# Patient Record
Sex: MALE | Race: WHITE | Employment: FULL TIME | URBAN - METROPOLITAN AREA
[De-identification: names, ages, dates, MRNs, and addresses within clinical notes are randomized per-mention and may not be internally consistent; named-entity substitution may affect disease eponyms.]

---

## 2017-01-10 ENCOUNTER — TELEPHONE (OUTPATIENT)
Dept: RHEUMATOLOGY | Facility: CLINIC | Age: 27
End: 2017-01-10

## 2017-02-08 ENCOUNTER — OFFICE VISIT (OUTPATIENT)
Dept: RHEUMATOLOGY | Facility: CLINIC | Age: 27
End: 2017-02-08

## 2017-02-08 VITALS
SYSTOLIC BLOOD PRESSURE: 128 MMHG | HEIGHT: 69 IN | WEIGHT: 225 LBS | BODY MASS INDEX: 33.33 KG/M2 | DIASTOLIC BLOOD PRESSURE: 78 MMHG | HEART RATE: 87 BPM

## 2017-02-08 DIAGNOSIS — M05.79 SEROPOSITIVE RHEUMATOID ARTHRITIS OF MULTIPLE SITES (HCC): Primary | ICD-10-CM

## 2017-02-08 DIAGNOSIS — M25.462 EFFUSION OF LEFT KNEE: ICD-10-CM

## 2017-02-08 DIAGNOSIS — Z51.81 ENCOUNTER FOR THERAPEUTIC DRUG MONITORING: ICD-10-CM

## 2017-02-08 PROCEDURE — 99212 OFFICE O/P EST SF 10 MIN: CPT | Performed by: INTERNAL MEDICINE

## 2017-02-08 PROCEDURE — 99214 OFFICE O/P EST MOD 30 MIN: CPT | Performed by: INTERNAL MEDICINE

## 2017-02-08 RX ORDER — HYDROCODONE BITARTRATE AND ACETAMINOPHEN 5; 325 MG/1; MG/1
TABLET ORAL
Qty: 60 TABLET | Refills: 0 | Status: SHIPPED | OUTPATIENT
Start: 2017-02-08 | End: 2017-03-07

## 2017-02-09 NOTE — PROGRESS NOTES
HPI:    Patient ID: Jossy Lyles is a 27year old male. 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  He was  switched from Enbrel to Actemra SC injection because of persistent left knee folic acid 1 MG Oral Tab Take 1 tablet (1 mg total) by mouth once daily. Disp: 90 tablet Rfl: 3   methotrexate 2.5 MG Oral Tab TAKE 8 TABLETS BY MOUTH ONCE WEEKLY.  Disp: 104 tablet Rfl: 1   Multiple Vitamins-Minerals (MULTI-VITAMIN/MINERALS) Oral Tab Nolen Essex Sig: Take 1 tablet every 4-6 hours as needed.            Imaging & Referrals:  None       #[phone]

## 2017-03-06 RX ORDER — PREDNISONE 1 MG/1
TABLET ORAL
Qty: 90 TABLET | Refills: 1 | Status: SHIPPED | OUTPATIENT
Start: 2017-03-06 | End: 2017-10-28

## 2017-03-07 RX ORDER — HYDROCODONE BITARTRATE AND ACETAMINOPHEN 5; 325 MG/1; MG/1
TABLET ORAL
Qty: 30 TABLET | Refills: 0 | Status: SHIPPED | OUTPATIENT
Start: 2017-03-07 | End: 2017-04-24

## 2017-03-07 NOTE — TELEPHONE ENCOUNTER
Per pt, he needs a refill on his HYDROcodone-acetaminophen 5-325 MG Oral Tab, pt will  in [facility]. Current Outpatient Prescriptions:        HYDROcodone-acetaminophen 5-325 MG Oral Tab Take 1 tablet every 4-6 hours as needed.  Disp: 60 tablet Rfl: 0

## 2017-04-18 NOTE — TELEPHONE ENCOUNTER
Pt is asking to have a refill on his pain med   PT need printed at [employer] [id number]   Please advise     Current Outpatient Prescriptions:  HYDROcodone-acetaminophen 5-325 MG Oral Tab Take 1 tablet every 4-6 hours as needed.  Disp: 30 tablet Rfl: 0

## 2017-04-19 RX ORDER — HYDROCODONE BITARTRATE AND ACETAMINOPHEN 5; 325 MG/1; MG/1
TABLET ORAL
Qty: 30 TABLET | Refills: 0 | OUTPATIENT
Start: 2017-04-19

## 2017-04-19 NOTE — TELEPHONE ENCOUNTER
Pt. Has to make appt. To get this refill - i dont' refill without appt. Or he can wait until he sees Dr. Lexi Degroot.

## 2017-04-20 NOTE — TELEPHONE ENCOUNTER
Spoke with patient and he will wait until Dr Giovanna Verduzco is back. He wants to  the script in [facility] on Tuesday. Routing to Dr Giovanna Verduzco. Please see message below.

## 2017-04-24 RX ORDER — HYDROCODONE BITARTRATE AND ACETAMINOPHEN 5; 325 MG/1; MG/1
TABLET ORAL
Qty: 30 TABLET | Refills: 0 | Status: SHIPPED | OUTPATIENT
Start: 2017-04-24 | End: 2017-06-05

## 2017-04-24 RX ORDER — ETANERCEPT 50 MG/ML
SOLUTION SUBCUTANEOUS
Qty: 12 ML | Refills: 1 | Status: SHIPPED | OUTPATIENT
Start: 2017-04-24 | End: 2017-08-28

## 2017-04-24 NOTE — TELEPHONE ENCOUNTER
Spoke with patient and let him know his Rx for Floydene Yankton will be ready for pickup at [facility] tomorrow.   available until [facility].

## 2017-05-24 NOTE — TELEPHONE ENCOUNTER
LOV:2-8  Last Filled:10-12, #104 with 1 refill  Labs:11-7, WNL  Future Appointments  Date Time Provider Rodrigo Cooper   6/26/2017 3:10 PM Kyle Alexander MD 2014 [facility]       Please Advise

## 2017-06-05 RX ORDER — HYDROCODONE BITARTRATE AND ACETAMINOPHEN 5; 325 MG/1; MG/1
TABLET ORAL
Qty: 20 TABLET | Refills: 0 | Status: SHIPPED | OUTPATIENT
Start: 2017-06-05 | End: 2017-08-28

## 2017-06-05 NOTE — TELEPHONE ENCOUNTER
LOV: 2/8/17 LR 4/24 #30 no refills. Pt would like to  script tomorrow at [facility]. Future Appointments  Date Time Provider Rodrigo Cooper   6/26/2017 3:10 PM Sonali Herrera MD 2014 [facility]     Please advise.

## 2017-06-05 NOTE — TELEPHONE ENCOUNTER
Pt calling requesting refill, please call when ready for  at [facility]. Current Outpatient Prescriptions:  HYDROcodone-acetaminophen 5-325 MG Oral Tab Take 1 tablet every 4-6 hours as needed.  Disp: 30 tablet Rfl: 0`

## 2017-06-15 ENCOUNTER — TELEPHONE (OUTPATIENT)
Dept: RHEUMATOLOGY | Facility: CLINIC | Age: 27
End: 2017-06-15

## 2017-06-15 NOTE — TELEPHONE ENCOUNTER
Please call him and remind him that he has to have his monitoring labs done before his upcoming appointment. They are long overdue! Thanks.

## 2017-06-15 NOTE — TELEPHONE ENCOUNTER
Left detailed message for pt to have standing lab orders completed in the system before his scheduled appointment on 6/26.

## 2017-06-19 ENCOUNTER — TELEPHONE (OUTPATIENT)
Dept: RHEUMATOLOGY | Facility: CLINIC | Age: 27
End: 2017-06-19

## 2017-06-19 NOTE — TELEPHONE ENCOUNTER
Most recent lab results faxed to Dr. Sera Sutherland office to [phone] as requested by pt. No further action required at this time.

## 2017-06-19 NOTE — TELEPHONE ENCOUNTER
Pt called in asking if his most recent lab results can be faxed over to Dr. Emma Carrera office, please. Fax # [phone] and phone # [phone]  Pt states he is going to be seeing this physician from now on.   Pt states he is at that doctors office right n

## 2017-10-30 RX ORDER — PREDNISONE 1 MG/1
TABLET ORAL
Qty: 90 TABLET | Refills: 1 | Status: SHIPPED | OUTPATIENT
Start: 2017-10-30 | End: 2019-04-18 | Stop reason: ALTCHOICE

## 2017-10-30 NOTE — TELEPHONE ENCOUNTER
LOV:2-8  Last Filled:3-6, #90 with 1 refill  Labs:   Future Appointments  Date Time Provider Rodrigo Allison   11/14/2017 5:40 PM MD Sina Perez       Please Advise

## 2017-11-09 RX ORDER — FOLIC ACID 1 MG/1
1 TABLET ORAL
Qty: 90 TABLET | Refills: 3 | Status: SHIPPED | OUTPATIENT
Start: 2017-11-09 | End: 2018-11-01

## 2017-11-09 NOTE — TELEPHONE ENCOUNTER
LOV: 2/8/17  Future Appointments  Date Time Provider Rodrigo Cooper   11/15/2017 5:00 PM MD Olga Curry Ce     Labs:   Component      Latest Ref Rng & Units 10/14/2017   WHITE BLOOD CELL COUNT      3.8 - 10.8 Thousand/uL 7.6   RED BL DIOXIDE      20 - 31 mmol/L 29   CALCIUM      8.6 - 10.3 mg/dL 9.6   PROTEIN, TOTAL      6.1 - 8.1 g/dL 7.5   Albumin      3.6 - 5.1 g/dL 4.5   GLOBULIN, TOTAL      1.9 - 3.7 g/dL (calc) 3.0   ALBUMIN/GLOBULIN RATIO      1.0 - 2.5 (calc) 1.5   Total Biliru

## 2017-11-09 NOTE — TELEPHONE ENCOUNTER
Need refill on pt Folic Acid. Current Outpatient Prescriptions:                          folic acid 1 MG Oral Tab Take 1 tablet (1 mg total) by mouth once daily.  Disp: 90 tablet Rfl: 3           ,

## 2018-04-11 ENCOUNTER — TELEPHONE (OUTPATIENT)
Dept: RHEUMATOLOGY | Facility: CLINIC | Age: 28
End: 2018-04-11

## 2018-04-11 DIAGNOSIS — M81.8 OTHER OSTEOPOROSIS WITHOUT CURRENT PATHOLOGICAL FRACTURE: Primary | ICD-10-CM

## 2018-04-11 NOTE — TELEPHONE ENCOUNTER
Please give him a call. He needs repeat labs and appointment. I was notified by Beba Smith that his bone density is a concern, given his chronic use of prednisone. I ordered a bone density scan, for him to have done at his convenience.     Thank yo

## 2018-04-21 RX ORDER — PREDNISONE 5 MG/1
TABLET ORAL
Qty: 90 TABLET | Refills: 1 | OUTPATIENT
Start: 2018-04-21

## 2018-04-21 NOTE — TELEPHONE ENCOUNTER
LOV:2-8-17  Last Filled: 10-30, #90 with 1 refill  Labs:   Future Appointments  Date Time Provider Rodrigo Allison   5/24/2018 6:00 PM Miguelina Rein, MD [facility]       Please Advise

## 2018-05-05 ENCOUNTER — HOSPITAL ENCOUNTER (OUTPATIENT)
Dept: CT IMAGING | Facility: HOSPITAL | Age: 28
Discharge: HOME | End: 2018-05-05
Attending: ORTHOPAEDIC SURGERY
Payer: COMMERCIAL

## 2018-05-05 DIAGNOSIS — R26.2 DIFFICULTY WALKING: ICD-10-CM

## 2018-05-05 DIAGNOSIS — R26.9 GAIT DISTURBANCE: ICD-10-CM

## 2018-05-05 DIAGNOSIS — M25.562 LEFT KNEE PAIN, UNSPECIFIED CHRONICITY: ICD-10-CM

## 2018-05-05 DIAGNOSIS — M25.462 KNEE EFFUSION, LEFT: ICD-10-CM

## 2018-05-05 DIAGNOSIS — M06.9 CHRONIC RHEUMATIC ARTHRITIS (HCC): ICD-10-CM

## 2018-05-05 PROCEDURE — 73700 CT LOWER EXTREMITY W/O DYE: CPT | Performed by: ORTHOPAEDIC SURGERY

## 2018-06-04 ENCOUNTER — LAB ENCOUNTER (OUTPATIENT)
Dept: LAB | Facility: HOSPITAL | Age: 28
End: 2018-06-04
Attending: ORTHOPAEDIC SURGERY
Payer: COMMERCIAL

## 2018-06-04 DIAGNOSIS — R26.2 CANNOT WALK: Primary | ICD-10-CM

## 2018-06-04 DIAGNOSIS — M25.562 LEFT KNEE PAIN: ICD-10-CM

## 2018-06-04 DIAGNOSIS — M25.462 SWELLING OF LEFT KNEE JOINT: ICD-10-CM

## 2018-06-04 PROCEDURE — 36415 COLL VENOUS BLD VENIPUNCTURE: CPT

## 2018-06-04 PROCEDURE — 86140 C-REACTIVE PROTEIN: CPT

## 2018-06-04 PROCEDURE — 93005 ELECTROCARDIOGRAM TRACING: CPT

## 2018-06-04 PROCEDURE — 85025 COMPLETE CBC W/AUTO DIFF WBC: CPT

## 2018-06-04 PROCEDURE — 93010 ELECTROCARDIOGRAM REPORT: CPT | Performed by: ORTHOPAEDIC SURGERY

## 2018-06-04 PROCEDURE — 80053 COMPREHEN METABOLIC PANEL: CPT

## 2018-06-04 PROCEDURE — 81001 URINALYSIS AUTO W/SCOPE: CPT

## 2018-06-04 PROCEDURE — 85652 RBC SED RATE AUTOMATED: CPT

## 2018-07-21 ENCOUNTER — LAB ENCOUNTER (OUTPATIENT)
Dept: LAB | Age: 28
End: 2018-07-21
Attending: ORTHOPAEDIC SURGERY
Payer: COMMERCIAL

## 2018-07-21 DIAGNOSIS — Z01.818 PREOPERATIVE EXAMINATION: Primary | ICD-10-CM

## 2018-07-21 LAB
ALBUMIN SERPL BCP-MCNC: 3.9 G/DL (ref 3.5–4.8)
ALBUMIN/GLOB SERPL: 1.1 {RATIO} (ref 1–2)
ALP SERPL-CCNC: 99 U/L (ref 32–100)
ALT SERPL-CCNC: 19 U/L (ref 17–63)
ANION GAP SERPL CALC-SCNC: 7 MMOL/L (ref 0–18)
AST SERPL-CCNC: 18 U/L (ref 15–41)
BASOPHILS # BLD: 0.1 K/UL (ref 0–0.2)
BASOPHILS NFR BLD: 1 %
BILIRUB SERPL-MCNC: 0.8 MG/DL (ref 0.3–1.2)
BUN SERPL-MCNC: 11 MG/DL (ref 8–20)
BUN/CREAT SERPL: 14.9 (ref 10–20)
CALCIUM SERPL-MCNC: 9.5 MG/DL (ref 8.5–10.5)
CHLORIDE SERPL-SCNC: 104 MMOL/L (ref 95–110)
CO2 SERPL-SCNC: 28 MMOL/L (ref 22–32)
CREAT SERPL-MCNC: 0.74 MG/DL (ref 0.5–1.5)
CRP SERPL-MCNC: 4.4 MG/DL (ref 0–0.9)
EOSINOPHIL # BLD: 0.1 K/UL (ref 0–0.7)
EOSINOPHIL NFR BLD: 2 %
ERYTHROCYTE [DISTWIDTH] IN BLOOD BY AUTOMATED COUNT: 13.6 % (ref 11–15)
ERYTHROCYTE [SEDIMENTATION RATE] IN BLOOD: 37 MM/HR (ref 0–15)
GLOBULIN PLAS-MCNC: 3.5 G/DL (ref 2.5–3.7)
GLUCOSE SERPL-MCNC: 95 MG/DL (ref 70–99)
HCT VFR BLD AUTO: 40.4 % (ref 41–52)
HGB BLD-MCNC: 13.6 G/DL (ref 13.5–17.5)
LYMPHOCYTES # BLD: 1.5 K/UL (ref 1–4)
LYMPHOCYTES NFR BLD: 23 %
MCH RBC QN AUTO: 31.4 PG (ref 27–32)
MCHC RBC AUTO-ENTMCNC: 33.7 G/DL (ref 32–37)
MCV RBC AUTO: 93.3 FL (ref 80–100)
MONOCYTES # BLD: 0.5 K/UL (ref 0–1)
MONOCYTES NFR BLD: 7 %
NEUTROPHILS # BLD AUTO: 4.3 K/UL (ref 1.8–7.7)
NEUTROPHILS NFR BLD: 66 %
OSMOLALITY UR CALC.SUM OF ELEC: 287 MOSM/KG (ref 275–295)
PATIENT FASTING: YES
PLATELET # BLD AUTO: 252 K/UL (ref 140–400)
PMV BLD AUTO: 9.9 FL (ref 7.4–10.3)
POTASSIUM SERPL-SCNC: 4 MMOL/L (ref 3.3–5.1)
PROT SERPL-MCNC: 7.4 G/DL (ref 5.9–8.4)
RBC # BLD AUTO: 4.33 M/UL (ref 4.5–5.9)
SODIUM SERPL-SCNC: 139 MMOL/L (ref 136–144)
WBC # BLD AUTO: 6.5 K/UL (ref 4–11)

## 2018-07-21 PROCEDURE — 36415 COLL VENOUS BLD VENIPUNCTURE: CPT

## 2018-07-21 PROCEDURE — 80053 COMPREHEN METABOLIC PANEL: CPT

## 2018-07-21 PROCEDURE — 85025 COMPLETE CBC W/AUTO DIFF WBC: CPT

## 2018-07-21 PROCEDURE — 85652 RBC SED RATE AUTOMATED: CPT

## 2018-07-21 PROCEDURE — 86140 C-REACTIVE PROTEIN: CPT

## 2018-07-21 PROCEDURE — 87086 URINE CULTURE/COLONY COUNT: CPT

## 2020-02-15 ENCOUNTER — OFFICE VISIT (OUTPATIENT)
Dept: RHEUMATOLOGY | Facility: CLINIC | Age: 30
End: 2020-02-15
Payer: COMMERCIAL

## 2020-02-15 ENCOUNTER — TELEPHONE (OUTPATIENT)
Dept: RHEUMATOLOGY | Facility: CLINIC | Age: 30
End: 2020-02-15

## 2020-02-15 VITALS
HEART RATE: 65 BPM | RESPIRATION RATE: 16 BRPM | WEIGHT: 230 LBS | HEIGHT: 70 IN | SYSTOLIC BLOOD PRESSURE: 130 MMHG | BODY MASS INDEX: 32.93 KG/M2 | DIASTOLIC BLOOD PRESSURE: 73 MMHG

## 2020-02-15 DIAGNOSIS — M05.79 RHEUMATOID ARTHRITIS INVOLVING MULTIPLE SITES WITH POSITIVE RHEUMATOID FACTOR (HCC): Primary | ICD-10-CM

## 2020-02-15 DIAGNOSIS — Z51.81 THERAPEUTIC DRUG MONITORING: ICD-10-CM

## 2020-02-15 DIAGNOSIS — E55.9 VITAMIN D DEFICIENCY: ICD-10-CM

## 2020-02-15 PROCEDURE — 99204 OFFICE O/P NEW MOD 45 MIN: CPT | Performed by: INTERNAL MEDICINE

## 2020-02-15 PROCEDURE — 99212 OFFICE O/P EST SF 10 MIN: CPT | Performed by: INTERNAL MEDICINE

## 2020-02-15 NOTE — PROGRESS NOTES
Jose Alfonso is a 30year old male who presents for Patient presents with:  Consult: Khang LYONS, meds  . HPI:     I had the pleasure of seeing Jose Alfonso on 2/15/2020 for evaluation.     He is a pleasant 30year old who had seropositive He has pain in his upper back. He's not been on any other medication. His eyes have gotten red and hurt. He has never seen an eye doctor. He's going to see an eye doctor. No hx of psoriasis. He sees dr. Jade George for a few years.  He has been fillin • No Known Problems Maternal Grandfather    • No Known Problems Paternal Grandmother    • No Known Problems Paternal Grandfather    • No Known Problems Sister    • No Known Problems Brother    • No Known Problems Other    no arthritis, 1 sister,    Social EXTREMITIES: no cyanosis, clubbing or edema  NEURO: intact touch, 5/5 ue and le strength    Component      Latest Ref Rng & Units 1/21/2019   Glucose Urine      mg/dL Negative   Bilirubin      Negative Negative   Ketones, UA      Negative mg/dL Negative BLASTS      % CANCELED   NUCLEATED RBC      0 /100 WBC CANCELED   COMMENT(S)       CANCELED   Glucose      65 - 99 mg/dL 94   BUN      7 - 25 mg/dL 11   CREATININE      0.60 - 1.35 mg/dL 0.76   eGFR NON-AFR.  AMERICAN      > OR = 60 mL/min/1.73m2 122   eGFR Consult: Seropositve RA, meds    1.  Seropositive RA- re -establihsing - since age 10 already had left knee arthroplasty b/c of RA effect and advanced RA changes /fusion of left wrist - severe ongoing RA -  That is partially controlled with Methotrexate   P patient

## 2020-02-15 NOTE — PATIENT INSTRUCTIONS
1. Check labs  2. Cont. Methotrexate 8 tablets a week   3. Cont. Folic acid 1mg a day   4. Plan to restart enbrel 50mg a week -   5. Return to clinic in 4-6 weeks. Etanercept injection  Brand Name: Enbrel  What is this medicine?   ETANERCEPT (et a NER sept usually do not require medical attention (report to your doctor or health care professional if they continue or are bothersome):  · dizziness  · headache  · nausea  · redness, itching, or swelling at the injection site  · vomiting  What may interact with t medicine. Call your doctor or health care professional for advice if you get a fever, chills or sore throat, or other symptoms of a cold or flu. Do not treat yourself. This drug decreases your body's ability to fight infections.  Try to avoid being around

## 2020-02-17 NOTE — TELEPHONE ENCOUNTER
Enrollment form completed. Unable to locate recent QFT results. Contacted pt and informed he will need to complete TB screening labs before office can initiate PA, he verbalized understanding - he will try to complete labs this week. Awaiting results.

## 2020-02-20 ENCOUNTER — APPOINTMENT (OUTPATIENT)
Dept: RHEUMATOLOGY | Facility: CLINIC | Age: 30
End: 2020-02-20

## 2020-04-10 NOTE — TELEPHONE ENCOUNTER
Remind letters sent to patient for completion of labs on 3/16/20 and 4/10/20. PA process will start when labs completed.

## 2020-06-01 ENCOUNTER — TELEPHONE (OUTPATIENT)
Dept: RHEUMATOLOGY | Facility: CLINIC | Age: 30
End: 2020-06-01

## 2020-06-01 ENCOUNTER — OFFICE VISIT (OUTPATIENT)
Dept: RHEUMATOLOGY | Facility: CLINIC | Age: 30
End: 2020-06-01
Payer: COMMERCIAL

## 2020-06-01 VITALS
RESPIRATION RATE: 16 BRPM | DIASTOLIC BLOOD PRESSURE: 69 MMHG | HEART RATE: 73 BPM | HEIGHT: 70 IN | SYSTOLIC BLOOD PRESSURE: 114 MMHG | WEIGHT: 220 LBS | BODY MASS INDEX: 31.5 KG/M2

## 2020-06-01 DIAGNOSIS — M05.79 RHEUMATOID ARTHRITIS INVOLVING MULTIPLE SITES WITH POSITIVE RHEUMATOID FACTOR (HCC): Primary | ICD-10-CM

## 2020-06-01 DIAGNOSIS — Z51.81 THERAPEUTIC DRUG MONITORING: ICD-10-CM

## 2020-06-01 PROCEDURE — 99214 OFFICE O/P EST MOD 30 MIN: CPT | Performed by: INTERNAL MEDICINE

## 2020-06-01 PROCEDURE — 99212 OFFICE O/P EST SF 10 MIN: CPT | Performed by: INTERNAL MEDICINE

## 2020-06-01 NOTE — PROGRESS NOTES
Ann Giordano is a 30year old male who presents for Patient presents with:  Rheumatoid Arthritis  Hand Pain  Knee Pain  Medication Follow-Up  . HPI:     I had the pleasure of seeing Ann Giordano on 2/15/2020 for evaluation.     He is a pleasan His right knee on occasion can hurt. And occl his elbows. occl his right ankle bothers him. No hip pain. He has pain in his upper back. He's not been on any other medication. His eyes have gotten red and hurt. He has never seen an eye doctor.  He's • Multiple Vitamins-Minerals (MULTI-VITAMIN/MINERALS) Oral Tab Take 1 tablet by mouth daily.      • CVS ASPIRIN  MG Oral Tab EC TAKE 1 TABLET BY MOUTH TWICE A DAY FOR A TOTAL OF 5 WEEKS FROM SURGERY DATE  0      Past Medical History:   Diagnosis Date Psych: no hx of anxiety or depression  ENDO: no hx of thyroid disease, no hx of DM  Joint/Muscluskeltal: see HPI,   All other ROS are negative.      EXAM:   /69 (BP Location: Left arm, Patient Position: Sitting, Cuff Size: adult)   Pulse 73   Resp 16 32.0 - 36.0 g/dL 34.2   RDW      11.0 - 15.0 % 13.0   Platelet Count      102 - 400 Thousand/uL 208   MPV      7.5 - 12.5 fL 11.7   NEUTROPHILS ABSOLUTE      1,500 - 7,800 cells/uL 4,904   ABSOLUTE BAND NEUTROPHILS      0 - 750 cells/uL CANCELED   ABSO >40 mg/dL 46   Triglycerides      <150 mg/dL 142   LDL Cholesterol Calc      mg/dL (calc) 101 (H)   CHOL/HDLC RATIO      <5.0 (calc) 3.7   NON-HDL CHOLESTEROL      <130 mg/dL (calc) 125   TSH      0.40 - 4.50 mIU/L 1.19       11/28/2014 - right wrist x 4. Still Plan to restart enbrel 50mg a week -   5. Return to clinic in 3 months.   Sept 1st at 4:30 pm - video visit     Kyle Berrios MD  6/1/2020   3:40 PM  - [place]ed [place]  through Epic

## 2020-06-01 NOTE — PATIENT INSTRUCTIONS
1 check labs . 2.Cont. Methotrexate 8 tablets a week   3. Cont. Folic acid 1mg a day   4. Still Plan to restart enbrel 50mg a week -   5. Return to clinic in 3 months.  Sept 1st at 4:30 pm - video visit

## 2020-06-01 NOTE — TELEPHONE ENCOUNTER
Follow up appointment scheduled as requested.   Future Appointments   Date Time Provider Rodrigo Cooper   9/1/2020  4:30 PM Michelle Christopher MD 2014 [facility]

## 2020-06-02 NOTE — TELEPHONE ENCOUNTER
He said he is going to get a [facility] by his work - ok to schedule a nurse visit with him once his labs are done

## 2020-06-02 NOTE — TELEPHONE ENCOUNTER
Prior authorization approved Case ID: [id number]      Payer:  [address]    [phone]     [phone]    CaseId:[id number];Status:Approved; Review Type:Prior Auth; Coverage Start Date:05/03/2020; Coverage End Date:08/31/2020;     ROBIN silveira

## 2020-06-08 NOTE — TELEPHONE ENCOUNTER
Pt contacted and reminded to have labs completed. Pt reported he will go and have labs completed this week.

## 2020-06-08 NOTE — TELEPHONE ENCOUNTER
When patient has labs completed new script will need to be sent to [facility] [address] [place] [address] for Enbrel. Lab currently not completed at this time.

## 2020-09-01 ENCOUNTER — TELEPHONE (OUTPATIENT)
Dept: RHEUMATOLOGY | Facility: CLINIC | Age: 30
End: 2020-09-01

## 2020-09-01 ENCOUNTER — TELEMEDICINE (OUTPATIENT)
Dept: RHEUMATOLOGY | Facility: CLINIC | Age: 30
End: 2020-09-01
Payer: COMMERCIAL

## 2020-09-01 DIAGNOSIS — Z51.81 THERAPEUTIC DRUG MONITORING: ICD-10-CM

## 2020-09-01 DIAGNOSIS — M05.79 RHEUMATOID ARTHRITIS INVOLVING MULTIPLE SITES WITH POSITIVE RHEUMATOID FACTOR (HCC): Primary | ICD-10-CM

## 2020-09-01 PROCEDURE — 99214 OFFICE O/P EST MOD 30 MIN: CPT | Performed by: INTERNAL MEDICINE

## 2020-09-01 NOTE — PATIENT INSTRUCTIONS
1.  Plan  To restart enbrel 50mg a week   2. Cont. Methotrexate 8 tablets a week   3. Cont. Folic acid 1mg a day   4. Check labs again in 3 months.    5. Return to clinic in 3 months

## 2020-09-01 NOTE — PROGRESS NOTES
Christiano Connor is a 30year old male who presents for No chief complaint on file. Manjinder Lee HPI:     I had the pleasure of seeing Christiano Connor on 2/15/2020 for evaluation. He is a pleasant 30year old who had seropositive RA - since age 10.  His rhe He's not been on any other medication. His eyes have gotten red and hurt. He has never seen an eye doctor. He's going to see an eye doctor. No hx of psoriasis. He sees dr. Jairo Muñoz for a few years. He has been filling the methotrexate.    He feels so Current Outpatient Medications   Medication Sig Dispense Refill   • HYDROcodone-acetaminophen (NORCO)  MG Oral Tab Take 1 tablet by mouth every 4 (four) hours as needed for Pain.  60 tablet 0   • Butalbital-APAP-Caffeine -40 MG Oral Tab Take 1 t CVS: No chest pain, no heart disease  RS: No SOB, no Cough, No Pleurtic pain,   GI: No nausea, no vomiiting, no abominal pain, no hx of ulcer, no gastritis, no heartburn, no dyshpagia, no BRBPR or melena  He has a lot of pain with eating bad food - he does 0 cells/uL CANCELED   ABSOLUTE LYMPHOCYTES      850 - 3,900 cells/uL 2,305   ABSOLUTE MONOCYTES      200 - 950 cells/uL 543   ABSOLUTE EOSINOPHILS      15 - 500 cells/uL 258   ABSOLUTE BASOPHILS      0 - 200 cells/uL 80   ABSOLUTE BLASTS      0 cells/u 1/11/2014 - left hand xray   CONCLUSION:  1. Advanced arthritic changes about the wrist consistent with rheumatoid     arthritis with fusion of multiple carpal bones as well as     metacarpocarpal joints.  There has been some progression of disease when Please note that the following visit was completed using two-way, real-time interactive audio and/or video communication. This was done with patient consent.    This has been done in good mode to provide continuity of care in the best interest of the prov

## 2020-09-02 LAB
ALBUMIN/GLOBULIN RATIO: 1.6 (CALC) (ref 1–2.5)
ALBUMIN: 4.5 G/DL (ref 3.6–5.1)
ALKALINE PHOSPHATASE: 114 U/L (ref 36–130)
ALT: 20 U/L (ref 9–46)
ANA SCREEN, IFA: NEGATIVE
AST: 16 U/L (ref 10–40)
BILIRUBIN, TOTAL: 0.3 MG/DL (ref 0.2–1.2)
BUN: 10 MG/DL (ref 7–25)
C-REACTIVE PROTEIN: 10.4 MG/L
CALCIUM: 9.7 MG/DL (ref 8.6–10.3)
CARBON DIOXIDE: 32 MMOL/L (ref 20–32)
CHLORIDE: 101 MMOL/L (ref 98–110)
CREATININE: 0.84 MG/DL (ref 0.6–1.35)
CYCLIC CITRULLINATED$PEPTIDE (CCP) AB (IGG): 120 UNITS
EGFR IF AFRICN AM: 135 ML/MIN/1.73M2
EGFR IF NONAFRICN AM: 117 ML/MIN/1.73M2
GLOBULIN: 2.9 G/DL (CALC) (ref 1.9–3.7)
GLUCOSE: 108 MG/DL (ref 65–99)
HEMATOCRIT: 41.4 % (ref 38.5–50)
HEMOGLOBIN: 14.4 G/DL (ref 13.2–17.1)
MCH: 32.3 PG (ref 27–33)
MCHC: 34.8 G/DL (ref 32–36)
MCV: 92.8 FL (ref 80–100)
MITOGEN-NIL: >10 IU/ML
MPV: 11.7 FL (ref 7.5–12.5)
NIL: 0.02 IU/ML
PLATELET COUNT: 238 THOUSAND/UL (ref 140–400)
POTASSIUM: 3.7 MMOL/L (ref 3.5–5.3)
PROTEIN, TOTAL: 7.4 G/DL (ref 6.1–8.1)
QUANTIFERON(R)-TB GOLD PLUS, 1 TUBE: NEGATIVE
RDW: 11.9 % (ref 11–15)
RED BLOOD CELL COUNT: 4.46 MILLION/UL (ref 4.2–5.8)
RHEUMATOID FACTOR: 27 IU/ML
SED RATE BY MODIFIED$WESTERGREN: 14 MM/H
SIGNAL TO CUT-OFF: 0.02
SODIUM: 141 MMOL/L (ref 135–146)
TB1-NIL: 0 IU/ML
TB2-NIL: 0 IU/ML
VITAMIN D, 25-OH, TOTAL: 26 NG/ML (ref 30–100)
WHITE BLOOD CELL COUNT: 9.6 THOUSAND/UL (ref 3.8–10.8)

## 2020-09-02 NOTE — TELEPHONE ENCOUNTER
PA has been approved since 6/1/2020. It is a restart, correct? ? Does patient know if specialty pharmacy is correct?     Pharmacy     [facility]. [address] [facility] [phone], [phone]   Outpatient Medication Detail

## 2020-09-02 NOTE — TELEPHONE ENCOUNTER
Patient aware PA approved for Enbrel. Pt advised to activate an Enbrel co-pay card online. Pt given [facility] [address] [phone], [phone] information to set up delivery after activation of co-pay card.

## 2020-09-02 NOTE — TELEPHONE ENCOUNTER
Ok great! Thank you . I didn't see that in the notes when I Was checking for him - yes it's a restart - he was off of it for several years though - so please check with him if that is his specialty . I dont' think he has changed his insurance since June.

## 2020-09-21 RX ORDER — ETANERCEPT 50 MG/ML
50 SOLUTION SUBCUTANEOUS
Qty: 4 ML | Refills: 5 | Status: SHIPPED | OUTPATIENT
Start: 2020-09-21 | End: 2021-02-28

## 2020-09-21 NOTE — TELEPHONE ENCOUNTER
Enbrel to be filled with new specialty pharmacy: [facility]. Script pended. LOV: 9/1/2020  No future appointments.   Labs:     Component      Latest Ref Rng & Units 8/31/2020   Glucose      65 - 99 mg/dL 108 (H)   BUN      7 - 25 mg/dL 10   CREATININE

## 2021-01-22 ENCOUNTER — TELEPHONE (OUTPATIENT)
Dept: RHEUMATOLOGY | Facility: CLINIC | Age: 31
End: 2021-01-22

## 2021-02-20 ENCOUNTER — OFFICE VISIT (OUTPATIENT)
Dept: RHEUMATOLOGY | Facility: CLINIC | Age: 31
End: 2021-02-20
Payer: COMMERCIAL

## 2021-02-20 VITALS
HEART RATE: 99 BPM | BODY MASS INDEX: 30.09 KG/M2 | HEIGHT: 70 IN | DIASTOLIC BLOOD PRESSURE: 78 MMHG | WEIGHT: 210.19 LBS | SYSTOLIC BLOOD PRESSURE: 120 MMHG

## 2021-02-20 DIAGNOSIS — M06.9 RHEUMATOID ARTHRITIS, INVOLVING UNSPECIFIED SITE, UNSPECIFIED WHETHER RHEUMATOID FACTOR PRESENT (HCC): Primary | ICD-10-CM

## 2021-02-20 DIAGNOSIS — Z51.81 THERAPEUTIC DRUG MONITORING: ICD-10-CM

## 2021-02-20 PROCEDURE — 3078F DIAST BP <80 MM HG: CPT | Performed by: INTERNAL MEDICINE

## 2021-02-20 PROCEDURE — 96372 THER/PROPH/DIAG INJ SC/IM: CPT | Performed by: INTERNAL MEDICINE

## 2021-02-20 PROCEDURE — 3074F SYST BP LT 130 MM HG: CPT | Performed by: INTERNAL MEDICINE

## 2021-02-20 PROCEDURE — 99214 OFFICE O/P EST MOD 30 MIN: CPT | Performed by: INTERNAL MEDICINE

## 2021-02-20 PROCEDURE — 3008F BODY MASS INDEX DOCD: CPT | Performed by: INTERNAL MEDICINE

## 2021-02-20 RX ORDER — METHYLPREDNISOLONE 4 MG/1
TABLET ORAL
Qty: 1 PACKAGE | Refills: 0 | Status: SHIPPED | OUTPATIENT
Start: 2021-02-20 | End: 2021-04-12 | Stop reason: ALTCHOICE

## 2021-02-20 RX ORDER — TRIAMCINOLONE ACETONIDE 40 MG/ML
40 INJECTION, SUSPENSION INTRA-ARTICULAR; INTRAMUSCULAR ONCE
Status: COMPLETED | OUTPATIENT
Start: 2021-02-20 | End: 2021-02-20

## 2021-02-20 RX ADMIN — TRIAMCINOLONE ACETONIDE 40 MG: 40 INJECTION, SUSPENSION INTRA-ARTICULAR; INTRAMUSCULAR at 09:03:00

## 2021-02-20 NOTE — PROGRESS NOTES
Olga Mcghee is a 26year old male who presents for Patient presents with:  Rheumatoid Arthritis  Joint Pain  Muscle Pain  . HPI:     I had the pleasure of seeing Olga Mcghee on 2/15/2020 for evaluation.     He is a pleasant 30year old who h hip pain. He has pain in his upper back. He's not been on any other medication. His eyes have gotten red and hurt. He has never seen an eye doctor. He's going to see an eye doctor. No hx of psoriasis. He sees dr. Suzanne López for a few years.  He has and worse by the day. He is having about 5/10 pain. It' snot good. He has pain on his left shoulder , hands , feet , ankles and right knee. He has left elbow pain. He can't straighten that.      Wt Readings from Last 2 Encounters:  02/20/21 : 210 lb 3 Review Of Systems:  Fatigue  Constitutional:No fever, no change in weight or appetitie  Derm: No rashes, no oral ulcers, no alopecia, no photosensitivity, no psoriasis  HEENT: No dry eyes, no dry mouth, no Raynaud's, no nasal ulcers, no parotid swelling, o swelling   No edema    Component      Latest Ref Rng & Units 8/31/2020   Glucose      65 - 99 mg/dL 108 (H)   BUN      7 - 25 mg/dL 10   CREATININE      0.60 - 1.35 mg/dL 0.84   eGFR NON-AFR.  AMERICAN      > OR = 60 mL/min/1.73m2 117   eGFR AFRICAN COOPER 11/28/2014 - right wrist xray   1. No acute disease. 2. Old fracture fifth metacarpal.  3. Degenerative cysts lunate bone. 1/11/2014 - left hand xray   CONCLUSION:  1.  Advanced arthritic changes about the wrist consistent with rheumatoid     arthritis

## 2021-02-20 NOTE — PATIENT INSTRUCTIONS
1.  Plan  To restart enbrel 50mg a week -   2. Cont. Methotrexate 8 tablets a week   3. Cont. Folic acid 1mg a day   4. Check labs this week   5. Return to clinic in 2-3  Months. 6. Kenalog 40mg im x 1   7.  Medrol santiago

## 2021-02-28 RX ORDER — ETANERCEPT 50 MG/ML
50 SOLUTION SUBCUTANEOUS
Qty: 4 ML | Refills: 5 | Status: SHIPPED | OUTPATIENT
Start: 2021-02-28 | End: 2021-03-01

## 2021-03-01 RX ORDER — ETANERCEPT 50 MG/ML
50 SOLUTION SUBCUTANEOUS
Qty: 4 ML | Refills: 5 | Status: SHIPPED | OUTPATIENT
Start: 2021-03-01 | End: 2021-05-15

## 2021-05-05 ENCOUNTER — HOSPITAL ENCOUNTER (OUTPATIENT)
Dept: GENERAL RADIOLOGY | Age: 31
Discharge: HOME | End: 2021-05-05
Attending: NURSE PRACTITIONER
Payer: COMMERCIAL

## 2021-05-05 DIAGNOSIS — M25.561 ACUTE PAIN OF RIGHT KNEE: ICD-10-CM

## 2021-05-05 PROCEDURE — 73564 X-RAY EXAM KNEE 4 OR MORE: CPT | Performed by: NURSE PRACTITIONER

## 2021-05-15 ENCOUNTER — TELEPHONE (OUTPATIENT)
Dept: RHEUMATOLOGY | Facility: CLINIC | Age: 31
End: 2021-05-15

## 2021-05-15 ENCOUNTER — OFFICE VISIT (OUTPATIENT)
Dept: RHEUMATOLOGY | Facility: CLINIC | Age: 31
End: 2021-05-15
Payer: COMMERCIAL

## 2021-05-15 VITALS
WEIGHT: 206 LBS | HEIGHT: 70 IN | DIASTOLIC BLOOD PRESSURE: 74 MMHG | SYSTOLIC BLOOD PRESSURE: 110 MMHG | RESPIRATION RATE: 16 BRPM | HEART RATE: 90 BPM | BODY MASS INDEX: 29.49 KG/M2

## 2021-05-15 DIAGNOSIS — Z51.81 THERAPEUTIC DRUG MONITORING: ICD-10-CM

## 2021-05-15 DIAGNOSIS — M06.9 RHEUMATOID ARTHRITIS, INVOLVING UNSPECIFIED SITE, UNSPECIFIED WHETHER RHEUMATOID FACTOR PRESENT (HCC): Primary | ICD-10-CM

## 2021-05-15 PROCEDURE — 96372 THER/PROPH/DIAG INJ SC/IM: CPT | Performed by: INTERNAL MEDICINE

## 2021-05-15 PROCEDURE — 99214 OFFICE O/P EST MOD 30 MIN: CPT | Performed by: INTERNAL MEDICINE

## 2021-05-15 PROCEDURE — 3008F BODY MASS INDEX DOCD: CPT | Performed by: INTERNAL MEDICINE

## 2021-05-15 PROCEDURE — 3078F DIAST BP <80 MM HG: CPT | Performed by: INTERNAL MEDICINE

## 2021-05-15 PROCEDURE — 3074F SYST BP LT 130 MM HG: CPT | Performed by: INTERNAL MEDICINE

## 2021-05-15 RX ORDER — METHYLPREDNISOLONE 4 MG/1
TABLET ORAL
Qty: 1 KIT | Refills: 0 | Status: SHIPPED | OUTPATIENT
Start: 2021-05-15 | End: 2021-06-01

## 2021-05-15 RX ORDER — ETANERCEPT 50 MG/ML
50 SOLUTION SUBCUTANEOUS
Qty: 4 ML | Refills: 5 | Status: SHIPPED | OUTPATIENT
Start: 2021-05-15 | End: 2021-08-21

## 2021-05-15 RX ORDER — TRIAMCINOLONE ACETONIDE 40 MG/ML
40 INJECTION, SUSPENSION INTRA-ARTICULAR; INTRAMUSCULAR ONCE
Status: COMPLETED | OUTPATIENT
Start: 2021-05-15 | End: 2021-05-15

## 2021-05-15 RX ADMIN — TRIAMCINOLONE ACETONIDE 40 MG: 40 INJECTION, SUSPENSION INTRA-ARTICULAR; INTRAMUSCULAR at 10:13:00

## 2021-05-15 NOTE — PROGRESS NOTES
Phil Jeffers is a 26year old male who presents for Patient presents with:  Rheumatoid Arthritis  Knee Pain  Hip Pain: Right  Shoulder Pain  Hand Pain  Foot Pain  Medication Follow-Up  .    HPI:     I had the pleasure of seeing Phil Jfefers on 2 hurt. And occl his elbows. occl his right ankle bothers him. No hip pain. He has pain in his upper back. He's not been on any other medication. His eyes have gotten red and hurt. He has never seen an eye doctor. He's going to see an eye doctor.    Ciarra Gould 12/2020 and he thinks hemight have had it. He is feeling worse and worse by the day. He is having about 5/10 pain. It' snot good. He has pain on his left shoulder , hands , feet , ankles and right knee. He has left elbow pain.  He can't straighten t • No Known Problems Maternal Grandmother    • No Known Problems Maternal Grandfather    • No Known Problems Paternal Grandmother    • No Known Problems Paternal Grandfather    • No Known Problems Sister    • No Known Problems Brother    • No Known Problems Ulnar deviation of right 5th finger   Left wrist +1 swelling with fusion   Right wrist +1-2 swellign with decreased extension and flexion. B/l elbows tender -   Left elbwo can't fully extend, tender +1 swelling.    Left shoulder can't abduct - newly decr NON-REACTIVE   SIGNAL TO CUT-OFF      <1.00 0.02   C-REACTIVE PROTEIN      <8.0 mg/L 10.4 (H)   SED RATE BY MODIFIED$WESTERGREN      < OR = 15 mm/h 14   EDWARD SCREEN, IFA      NEGATIVE NEGATIVE   HEPATITIS B CORE AB TOTAL      NON-REACTIVE NON-REACTIVE   HBS in the past   Kenalog 40mg Im x 1   Medrol santiago today     2. Left knee arthroplasty in 2018 - hx of AVN of left knee     3. He hasn't gotten the covid vaccine yet - d/w him that if he gets it he can wait 2 weeks.  Afterwards and thens tart enbrel if he gest

## 2021-05-15 NOTE — PATIENT INSTRUCTIONS
1.  Plan  To restart enbrel 50mg a week -   2. Cont. Methotrexate 8 tablets a week   3. Cont. Folic acid 1mg a day   4. Check labs this week   5. Return to clinic in 3  Months. 6.Medrol santiago  7. Due for labs today   8.  Kenalog 40mg im x 1

## 2021-05-15 NOTE — TELEPHONE ENCOUNTER
Pt. Did not receive his enbrel when sent in march to [facility] special[facility] - so [facility]    can we double check what is specialty pharmacy is again and I will resend the script there.

## 2021-05-17 NOTE — TELEPHONE ENCOUNTER
Contacted patient. He does not know if there is a preferred pharmacy. Contacted pharmacy. They stated they have the prescription and it is ready to fill. They confirmed Domingo's phone number and they will call him to set up delivery.

## 2021-07-16 ENCOUNTER — HOSPITAL ENCOUNTER (OUTPATIENT)
Dept: GENERAL RADIOLOGY | Age: 31
Discharge: HOME | End: 2021-07-16
Attending: INTERNAL MEDICINE
Payer: COMMERCIAL

## 2021-07-16 DIAGNOSIS — M25.572 PAIN IN LEFT ANKLE AND JOINTS OF LEFT FOOT: ICD-10-CM

## 2021-07-16 DIAGNOSIS — M25.571 PAIN IN JOINT INVOLVING RIGHT ANKLE AND FOOT: ICD-10-CM

## 2021-07-16 DIAGNOSIS — M25.512 ACUTE PAIN OF LEFT SHOULDER: ICD-10-CM

## 2021-07-16 PROCEDURE — 73030 X-RAY EXAM OF SHOULDER: CPT | Performed by: INTERNAL MEDICINE

## 2021-07-16 PROCEDURE — 73630 X-RAY EXAM OF FOOT: CPT | Performed by: INTERNAL MEDICINE

## 2021-07-16 PROCEDURE — 73610 X-RAY EXAM OF ANKLE: CPT | Performed by: INTERNAL MEDICINE

## 2021-08-16 ENCOUNTER — HOSPITAL ENCOUNTER (OUTPATIENT)
Dept: ULTRASOUND IMAGING | Age: 31
Discharge: HOME | End: 2021-08-16
Attending: INTERNAL MEDICINE
Payer: COMMERCIAL

## 2021-08-16 DIAGNOSIS — R63.4 ABNORMAL WEIGHT LOSS: ICD-10-CM

## 2021-08-16 PROCEDURE — 76700 US EXAM ABDOM COMPLETE: CPT | Performed by: INTERNAL MEDICINE

## 2021-08-21 ENCOUNTER — TELEPHONE (OUTPATIENT)
Dept: RHEUMATOLOGY | Facility: CLINIC | Age: 31
End: 2021-08-21

## 2021-08-21 ENCOUNTER — OFFICE VISIT (OUTPATIENT)
Dept: RHEUMATOLOGY | Facility: CLINIC | Age: 31
End: 2021-08-21
Payer: COMMERCIAL

## 2021-08-21 ENCOUNTER — LAB ENCOUNTER (OUTPATIENT)
Dept: LAB | Facility: HOSPITAL | Age: 31
End: 2021-08-21
Attending: INTERNAL MEDICINE
Payer: COMMERCIAL

## 2021-08-21 VITALS
SYSTOLIC BLOOD PRESSURE: 129 MMHG | DIASTOLIC BLOOD PRESSURE: 75 MMHG | WEIGHT: 177 LBS | HEART RATE: 111 BPM | BODY MASS INDEX: 25.34 KG/M2 | HEIGHT: 70 IN

## 2021-08-21 DIAGNOSIS — Z51.81 THERAPEUTIC DRUG MONITORING: ICD-10-CM

## 2021-08-21 DIAGNOSIS — M06.9 RHEUMATOID ARTHRITIS, INVOLVING UNSPECIFIED SITE, UNSPECIFIED WHETHER RHEUMATOID FACTOR PRESENT (HCC): Primary | ICD-10-CM

## 2021-08-21 DIAGNOSIS — M06.9 ARTHRITIS, RHEUMATOID (HCC): Primary | ICD-10-CM

## 2021-08-21 DIAGNOSIS — G89.29 CHRONIC PAIN OF RIGHT KNEE: ICD-10-CM

## 2021-08-21 DIAGNOSIS — M06.9 RHEUMATOID ARTHRITIS, INVOLVING UNSPECIFIED SITE, UNSPECIFIED WHETHER RHEUMATOID FACTOR PRESENT (HCC): ICD-10-CM

## 2021-08-21 DIAGNOSIS — M25.561 CHRONIC PAIN OF RIGHT KNEE: ICD-10-CM

## 2021-08-21 LAB
ALBUMIN SERPL-MCNC: 2.9 G/DL (ref 3.4–5)
ALBUMIN/GLOB SERPL: 0.5 {RATIO} (ref 1–2)
ALP LIVER SERPL-CCNC: 186 U/L
ALT SERPL-CCNC: 18 U/L
ANION GAP SERPL CALC-SCNC: 8 MMOL/L (ref 0–18)
AST SERPL-CCNC: 15 U/L (ref 15–37)
BILIRUB SERPL-MCNC: 0.4 MG/DL (ref 0.1–2)
BUN BLD-MCNC: 6 MG/DL (ref 7–18)
BUN/CREAT SERPL: 8.6 (ref 10–20)
CALCIUM BLD-MCNC: 9.6 MG/DL (ref 8.5–10.1)
CHLORIDE SERPL-SCNC: 102 MMOL/L (ref 98–112)
CO2 SERPL-SCNC: 26 MMOL/L (ref 21–32)
CREAT BLD-MCNC: 0.7 MG/DL
CRP SERPL-MCNC: 17.7 MG/DL (ref ?–0.3)
DEPRECATED RDW RBC AUTO: 43.7 FL (ref 35.1–46.3)
ERYTHROCYTE [DISTWIDTH] IN BLOOD BY AUTOMATED COUNT: 13.6 % (ref 11–15)
ERYTHROCYTE [SEDIMENTATION RATE] IN BLOOD: 84 MM/HR
GLOBULIN PLAS-MCNC: 5.8 G/DL (ref 2.8–4.4)
GLUCOSE BLD-MCNC: 122 MG/DL (ref 70–99)
HCT VFR BLD AUTO: 35 %
HGB BLD-MCNC: 10.9 G/DL
M PROTEIN MFR SERPL ELPH: 8.7 G/DL (ref 6.4–8.2)
MCH RBC QN AUTO: 27.2 PG (ref 26–34)
MCHC RBC AUTO-ENTMCNC: 31.1 G/DL (ref 31–37)
MCV RBC AUTO: 87.3 FL
OSMOLALITY SERPL CALC.SUM OF ELEC: 281 MOSM/KG (ref 275–295)
PATIENT FASTING Y/N/NP: NO
PLATELET # BLD AUTO: 470 10(3)UL (ref 150–450)
POTASSIUM SERPL-SCNC: 3.7 MMOL/L (ref 3.5–5.1)
RBC # BLD AUTO: 4.01 X10(6)UL
SODIUM SERPL-SCNC: 136 MMOL/L (ref 136–145)
WBC # BLD AUTO: 9.9 X10(3) UL (ref 4–11)

## 2021-08-21 PROCEDURE — 36415 COLL VENOUS BLD VENIPUNCTURE: CPT | Performed by: INTERNAL MEDICINE

## 2021-08-21 PROCEDURE — 85652 RBC SED RATE AUTOMATED: CPT | Performed by: INTERNAL MEDICINE

## 2021-08-21 PROCEDURE — 3008F BODY MASS INDEX DOCD: CPT | Performed by: INTERNAL MEDICINE

## 2021-08-21 PROCEDURE — 99214 OFFICE O/P EST MOD 30 MIN: CPT | Performed by: INTERNAL MEDICINE

## 2021-08-21 PROCEDURE — 3074F SYST BP LT 130 MM HG: CPT | Performed by: INTERNAL MEDICINE

## 2021-08-21 PROCEDURE — 85027 COMPLETE CBC AUTOMATED: CPT | Performed by: INTERNAL MEDICINE

## 2021-08-21 PROCEDURE — 20610 DRAIN/INJ JOINT/BURSA W/O US: CPT | Performed by: INTERNAL MEDICINE

## 2021-08-21 PROCEDURE — 3078F DIAST BP <80 MM HG: CPT | Performed by: INTERNAL MEDICINE

## 2021-08-21 PROCEDURE — 80053 COMPREHEN METABOLIC PANEL: CPT | Performed by: INTERNAL MEDICINE

## 2021-08-21 PROCEDURE — 86140 C-REACTIVE PROTEIN: CPT | Performed by: INTERNAL MEDICINE

## 2021-08-21 PROCEDURE — 86480 TB TEST CELL IMMUN MEASURE: CPT

## 2021-08-21 RX ORDER — ETANERCEPT 50 MG/ML
50 SOLUTION SUBCUTANEOUS
Qty: 4 ML | Refills: 5 | Status: SHIPPED | OUTPATIENT
Start: 2021-08-21 | End: 2021-09-20

## 2021-08-21 RX ORDER — METHYLPREDNISOLONE 4 MG/1
TABLET ORAL
Qty: 1 EACH | Refills: 0 | Status: SHIPPED | OUTPATIENT
Start: 2021-08-21 | End: 2021-10-01 | Stop reason: ALTCHOICE

## 2021-08-21 RX ORDER — TRIAMCINOLONE ACETONIDE 40 MG/ML
40 INJECTION, SUSPENSION INTRA-ARTICULAR; INTRAMUSCULAR ONCE
Status: COMPLETED | OUTPATIENT
Start: 2021-08-21 | End: 2021-08-21

## 2021-08-21 RX ADMIN — TRIAMCINOLONE ACETONIDE 40 MG: 40 INJECTION, SUSPENSION INTRA-ARTICULAR; INTRAMUSCULAR at 10:35:00

## 2021-08-21 NOTE — PATIENT INSTRUCTIONS
1.   restart enbrel 50mg a week - will check with the pharmacy -   2. Cont. Methotrexate 8 tablets a week   3. Cont. Folic acid 1mg a day   4. Check labs today   5. Return to clinic in1  Months. 6.Medrol santiago  7. Due for labs today   8.  Rest right knee x 3

## 2021-08-21 NOTE — PROCEDURES
With paitent's consent, I injected pt's right knee with 1ml lidocaine 1 % and 1 ml kenalog 40. It was done under sterile technique using iodine and alcohol swabs and ethyl chloride was used as an anaesthetic spray.  Pt.  tolerated it well.  r

## 2021-08-21 NOTE — PROGRESS NOTES
Ann Giordano is a 26year old male who presents for Patient presents with:  Medication Follow-Up  Joint Pain: Swelling and stiffness  . HPI:     I had the pleasure of seeing Ann Giordano on 2/15/2020 for evaluation.     He is a pleasant 29 yea him. No hip pain. He has pain in his upper back. He's not been on any other medication. His eyes have gotten red and hurt. He has never seen an eye doctor. He's going to see an eye doctor. No hx of psoriasis.      He sees dr. Dedrick Sanford for a few years worse and worse by the day. He is having about 5/10 pain. It' snot good. He has pain on his left shoulder , hands , feet , ankles and right knee. He has left elbow pain. He can't straighten that. 5/15/2021  He has 5/10 pain .  He has a lot of disc Past Surgical History:   Procedure Laterality Date   • KNEE ARTHROSCOPY Left 2009    ACL/MCL repair    • KNEE REPLACEMENT SURGERY Left 08/2018      Family History   Problem Relation Age of Onset   • No Known Problems Father    • No Known Problems Mother CAD, no neck tendnerness, good ROM,   No rashes  CVS: RRR, no murmurs  RS: CTAB, no crackles, no rhonchi  ABD: Soft Non tender,   Joint exam   Mild tender in mcps - lef tmore than right -   Chronic +2 -3swelling in right 2nd and 3rd mcps and left+2 2nd and 32.3   MCHC      32.0 - 36.0 g/dL 34.8   RDW      11.0 - 15.0 % 11.9   Platelet Count      586 - 400 Thousand/uL 238   MPV      7.5 - 12.5 fL 11.7   QUANTIFERON(R)-TB GOLD PLUS, 1 TUBE      NEGATIVE NEGATIVE   NIL      IU/mL 0.02   MITOGEN-NIL      IU/mL > partially controlled with Methotrexate   - cont . methotrexate 20mg a week   - plan on restarting enbrel 50mg a week - has new insurance since January - and need to resent his enbrel - he didn't receive it when sent on3/1/2021 - - and dind't receive phone c

## 2021-08-21 NOTE — TELEPHONE ENCOUNTER
Pt. Has not gotten enbrel - [facility] that he needs it to be sent to. We sent it to home delivery - but I noticed it's not specialyt - . He states he never got a phone call from the pharmacy.

## 2021-08-23 NOTE — TELEPHONE ENCOUNTER
Patient appt switched.      Future Appointments   Date Time Provider Department Center   9/1/2021  3:20 PM MD [facility] ([facility]) Dustin Mathew   9/22/2021 10:40 AM Chuck Chinchilla MD [address] [place] [address]

## 2021-08-23 NOTE — TELEPHONE ENCOUNTER
Belle sent. I called [facility] and they stated patient has a little over $200 co-pay per month. But he can contact their co-pay department to get started on co-pay card. Spoke to patient if insurance had changed. He stated he has [address].  I info

## 2021-08-24 ENCOUNTER — APPOINTMENT (OUTPATIENT)
Dept: URBAN - METROPOLITAN AREA CLINIC 321 | Age: 31
Setting detail: DERMATOLOGY
End: 2021-08-24

## 2021-08-24 DIAGNOSIS — D22 MELANOCYTIC NEVI: ICD-10-CM

## 2021-08-24 PROBLEM — D22.62 MELANOCYTIC NEVI OF LEFT UPPER LIMB, INCLUDING SHOULDER: Status: ACTIVE | Noted: 2021-08-24

## 2021-08-24 LAB
M TB IFN-G CD4+ T-CELLS BLD-ACNC: 0.04 IU/ML
M TB TUBERC IFN-G BLD QL: NEGATIVE
M TB TUBERC IGNF/MITOGEN IGNF CONTROL: 0.71 IU/ML
QFT TB1 AG MINUS NIL: 0.04 IU/ML
QFT TB2 AG MINUS NIL: -0.01 IU/ML

## 2021-08-24 PROCEDURE — 99024 POSTOP FOLLOW-UP VISIT: CPT

## 2021-08-24 PROCEDURE — OTHER SUTURE REMOVAL (GLOBAL PERIOD): OTHER

## 2021-08-24 ASSESSMENT — LOCATION ZONE DERM: LOCATION ZONE: ARM

## 2021-08-24 ASSESSMENT — LOCATION DETAILED DESCRIPTION DERM: LOCATION DETAILED: LEFT POSTERIOR SHOULDER

## 2021-08-24 ASSESSMENT — LOCATION SIMPLE DESCRIPTION DERM: LOCATION SIMPLE: LEFT SHOULDER

## 2021-08-24 NOTE — PROCEDURE: SUTURE REMOVAL (GLOBAL PERIOD)
Add 06594 Cpt? (Important Note: In 2017 The Use Of 17020 Is Being Tracked By Cms To Determine Future Global Period Reimbursement For Global Periods): yes
Detail Level: Simple
Body Location Override (Optional - Billing Will Still Be Based On Selected Body Map Location If Applicable): L upper back

## 2021-09-20 ENCOUNTER — TELEPHONE (OUTPATIENT)
Dept: SURGERY | Facility: CLINIC | Age: 31
End: 2021-09-20

## 2021-09-20 NOTE — TELEPHONE ENCOUNTER
Per Dr. Karthik Foster,    Please offer the patient an appt tomorrow at 9am with Dr. Karthik Foster in Neuro oncology, okay to book x 3. He will need to bring his imaging disc for uploading at his appointment. Please call and offer appt.  Thank you

## 2021-09-20 NOTE — TELEPHONE ENCOUNTER
Patient called and accepted appointment on 9/21/2021 @ 9am.  Informed pt that he needs to bring imaging with to appointment please arrive a little before 9am with imaging. Also informed pt of length of appointment.

## 2021-09-21 ENCOUNTER — OFFICE VISIT (OUTPATIENT)
Dept: NEUROLOGY | Facility: CLINIC | Age: 31
End: 2021-09-21
Payer: COMMERCIAL

## 2021-09-21 ENCOUNTER — NURSE ONLY (OUTPATIENT)
Dept: HEMATOLOGY/ONCOLOGY | Facility: HOSPITAL | Age: 31
End: 2021-09-21
Attending: NEUROLOGICAL SURGERY
Payer: COMMERCIAL

## 2021-09-21 VITALS
WEIGHT: 180.19 LBS | HEART RATE: 119 BPM | SYSTOLIC BLOOD PRESSURE: 121 MMHG | BODY MASS INDEX: 25.8 KG/M2 | OXYGEN SATURATION: 99 % | TEMPERATURE: 98 F | RESPIRATION RATE: 16 BRPM | DIASTOLIC BLOOD PRESSURE: 79 MMHG | HEIGHT: 70 IN

## 2021-09-21 DIAGNOSIS — R53.83 FATIGUE, UNSPECIFIED TYPE: ICD-10-CM

## 2021-09-21 DIAGNOSIS — D49.6 BRAIN TUMOR (HCC): Primary | ICD-10-CM

## 2021-09-21 DIAGNOSIS — R11.2 NAUSEA AND VOMITING, INTRACTABILITY OF VOMITING NOT SPECIFIED, UNSPECIFIED VOMITING TYPE: ICD-10-CM

## 2021-09-21 DIAGNOSIS — H49.22 6TH NERVE PALSY, LEFT: ICD-10-CM

## 2021-09-21 DIAGNOSIS — R63.4 WEIGHT LOSS, UNINTENTIONAL: ICD-10-CM

## 2021-09-21 DIAGNOSIS — R19.7 DIARRHEA, UNSPECIFIED TYPE: ICD-10-CM

## 2021-09-21 PROCEDURE — 99204 OFFICE O/P NEW MOD 45 MIN: CPT | Performed by: NEUROLOGICAL SURGERY

## 2021-09-21 PROCEDURE — 99211 OFF/OP EST MAY X REQ PHY/QHP: CPT

## 2021-09-21 NOTE — H&P
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 Claudell Sandhoff:   Thank you very muc Paternal Grandfather    • No Known Problems Sister    • No Known Problems Brother    • No Known Problems Other       Social History    Socioeconomic History      Marital status:     Tobacco Use      Smoking status: Never Smoker      Smokeless tobacc answering questions appropriately. Left 6th nerve palsy. Decreased left F2N    SPINE:  Gait/Coordination: Intact, non-antalgic gait.    Good strength of bilateral upper and lower extremities   DATA:   None    IMAGING:   MRI Brain, outside imaging reviewed w reviewed imaging and discussed the plan with the patient. The patient agrees with the plan, verbalized understanding and is appreciative. All questions were sought out and thoroughly answered to satisfaction.        Total visit time: 50 min  More than 50% s

## 2021-09-24 ENCOUNTER — LAB ENCOUNTER (OUTPATIENT)
Dept: LAB | Facility: HOSPITAL | Age: 31
End: 2021-09-24
Attending: INTERNAL MEDICINE
Payer: COMMERCIAL

## 2021-09-24 DIAGNOSIS — Z01.818 PRE-OP TESTING: ICD-10-CM

## 2021-09-25 LAB — SARS-COV-2 RNA RESP QL NAA+PROBE: NOT DETECTED

## 2021-09-27 ENCOUNTER — HOSPITAL ENCOUNTER (OUTPATIENT)
Facility: HOSPITAL | Age: 31
Setting detail: HOSPITAL OUTPATIENT SURGERY
Discharge: HOME | End: 2021-09-27
Attending: INTERNAL MEDICINE | Admitting: INTERNAL MEDICINE
Payer: COMMERCIAL

## 2021-09-27 VITALS
WEIGHT: 180 LBS | DIASTOLIC BLOOD PRESSURE: 82 MMHG | RESPIRATION RATE: 14 BRPM | HEIGHT: 70 IN | BODY MASS INDEX: 25.77 KG/M2 | HEART RATE: 90 BPM | OXYGEN SATURATION: 97 % | SYSTOLIC BLOOD PRESSURE: 130 MMHG | TEMPERATURE: 98 F

## 2021-09-27 DIAGNOSIS — R19.7 DIARRHEA, UNSPECIFIED TYPE: ICD-10-CM

## 2021-09-27 DIAGNOSIS — R11.2 NON-INTRACTABLE VOMITING WITH NAUSEA, UNSPECIFIED VOMITING TYPE: ICD-10-CM

## 2021-09-27 DIAGNOSIS — D50.9 IRON DEFICIENCY ANEMIA, UNSPECIFIED IRON DEFICIENCY ANEMIA TYPE: ICD-10-CM

## 2021-09-27 DIAGNOSIS — R63.4 WEIGHT LOSS: ICD-10-CM

## 2021-09-27 DIAGNOSIS — R68.81 EARLY SATIETY: ICD-10-CM

## 2021-09-27 DIAGNOSIS — Z01.818 PRE-OP TESTING: Primary | ICD-10-CM

## 2021-09-27 PROCEDURE — 36415 COLL VENOUS BLD VENIPUNCTURE: CPT | Performed by: INTERNAL MEDICINE

## 2021-09-27 RX ORDER — SODIUM CHLORIDE, SODIUM LACTATE, POTASSIUM CHLORIDE, CALCIUM CHLORIDE 600; 310; 30; 20 MG/100ML; MG/100ML; MG/100ML; MG/100ML
INJECTION, SOLUTION INTRAVENOUS CONTINUOUS
Status: DISCONTINUED | OUTPATIENT
Start: 2021-09-27 | End: 2021-09-27

## 2021-09-27 NOTE — H&P
The H&P dated 9/22/21 was reviewed by Joselyn Bales MD today, the patient was examined and no significant changes have occurred in the patient's condition since the H&P was performed.   I discussed with the patient and/or legal representative the potential

## 2021-09-29 ENCOUNTER — HOSPITAL ENCOUNTER (OUTPATIENT)
Dept: MRI IMAGING | Age: 31
Discharge: HOME | End: 2021-09-29
Attending: PHYSICIAN ASSISTANT
Payer: COMMERCIAL

## 2021-09-29 DIAGNOSIS — R19.7 DIARRHEA, UNSPECIFIED TYPE: ICD-10-CM

## 2021-09-29 DIAGNOSIS — R63.4 WEIGHT LOSS, UNINTENTIONAL: ICD-10-CM

## 2021-09-29 DIAGNOSIS — H49.22 6TH NERVE PALSY, LEFT: ICD-10-CM

## 2021-09-29 DIAGNOSIS — R11.2 NAUSEA AND VOMITING, INTRACTABILITY OF VOMITING NOT SPECIFIED, UNSPECIFIED VOMITING TYPE: ICD-10-CM

## 2021-09-29 DIAGNOSIS — R53.83 FATIGUE, UNSPECIFIED TYPE: ICD-10-CM

## 2021-09-29 DIAGNOSIS — D49.6 BRAIN TUMOR (HCC): ICD-10-CM

## 2021-09-29 PROCEDURE — 72156 MRI NECK SPINE W/O & W/DYE: CPT | Performed by: PHYSICIAN ASSISTANT

## 2021-09-29 PROCEDURE — 72157 MRI CHEST SPINE W/O & W/DYE: CPT | Performed by: PHYSICIAN ASSISTANT

## 2021-09-29 PROCEDURE — A9575 INJ GADOTERATE MEGLUMI 0.1ML: HCPCS | Performed by: PHYSICIAN ASSISTANT

## 2021-10-01 ENCOUNTER — HOSPITAL ENCOUNTER (OUTPATIENT)
Dept: MRI IMAGING | Age: 31
Discharge: HOME | End: 2021-10-01
Attending: PHYSICIAN ASSISTANT
Payer: COMMERCIAL

## 2021-10-01 ENCOUNTER — HOSPITAL ENCOUNTER (OUTPATIENT)
Dept: MRI IMAGING | Age: 31
End: 2021-10-01
Attending: PHYSICIAN ASSISTANT
Payer: COMMERCIAL

## 2021-10-01 DIAGNOSIS — R19.7 DIARRHEA, UNSPECIFIED TYPE: ICD-10-CM

## 2021-10-01 DIAGNOSIS — R53.83 FATIGUE, UNSPECIFIED TYPE: ICD-10-CM

## 2021-10-01 DIAGNOSIS — R11.2 NAUSEA AND VOMITING, INTRACTABILITY OF VOMITING NOT SPECIFIED, UNSPECIFIED VOMITING TYPE: ICD-10-CM

## 2021-10-01 DIAGNOSIS — M54.50 LOWER BACK PAIN: ICD-10-CM

## 2021-10-01 DIAGNOSIS — D49.6 BRAIN TUMOR (HCC): ICD-10-CM

## 2021-10-01 DIAGNOSIS — H49.22 6TH NERVE PALSY, LEFT: ICD-10-CM

## 2021-10-01 DIAGNOSIS — R63.4 WEIGHT LOSS, UNINTENTIONAL: ICD-10-CM

## 2021-10-01 PROCEDURE — 70553 MRI BRAIN STEM W/O & W/DYE: CPT | Performed by: PHYSICIAN ASSISTANT

## 2021-10-01 PROCEDURE — 72158 MRI LUMBAR SPINE W/O & W/DYE: CPT | Performed by: PHYSICIAN ASSISTANT

## 2021-10-01 PROCEDURE — A9575 INJ GADOTERATE MEGLUMI 0.1ML: HCPCS | Performed by: PHYSICIAN ASSISTANT

## 2021-10-02 ENCOUNTER — HOSPITAL ENCOUNTER (OUTPATIENT)
Dept: CT IMAGING | Facility: HOSPITAL | Age: 31
Discharge: HOME | End: 2021-10-02
Attending: PHYSICIAN ASSISTANT
Payer: COMMERCIAL

## 2021-10-02 DIAGNOSIS — R19.7 DIARRHEA, UNSPECIFIED TYPE: ICD-10-CM

## 2021-10-02 DIAGNOSIS — D49.6 BRAIN TUMOR (HCC): ICD-10-CM

## 2021-10-02 DIAGNOSIS — R53.83 FATIGUE, UNSPECIFIED TYPE: ICD-10-CM

## 2021-10-02 DIAGNOSIS — R11.2 NAUSEA AND VOMITING, INTRACTABILITY OF VOMITING NOT SPECIFIED, UNSPECIFIED VOMITING TYPE: ICD-10-CM

## 2021-10-02 DIAGNOSIS — R63.4 WEIGHT LOSS, UNINTENTIONAL: ICD-10-CM

## 2021-10-02 DIAGNOSIS — H49.22 6TH NERVE PALSY, LEFT: ICD-10-CM

## 2021-10-02 PROCEDURE — 74177 CT ABD & PELVIS W/CONTRAST: CPT | Performed by: PHYSICIAN ASSISTANT

## 2021-10-02 PROCEDURE — 82565 ASSAY OF CREATININE: CPT

## 2021-10-02 PROCEDURE — 71260 CT THORAX DX C+: CPT | Performed by: PHYSICIAN ASSISTANT

## 2021-10-04 ENCOUNTER — TELEPHONE (OUTPATIENT)
Dept: SURGERY | Facility: CLINIC | Age: 31
End: 2021-10-04

## 2021-10-04 NOTE — TELEPHONE ENCOUNTER
Pt completed MRI of lumbar spine and brain and also a CT last week. He would like a call to discuss scheduling surgery.

## 2021-10-04 NOTE — TELEPHONE ENCOUNTER
Appointment scheduled for 10/12/2021 @ 11:15. Pt aware of appointment. Informed oncology clinic to add to conference. KARLOI pt wasn't happy that he had to make appointment to come in again.   So you can tell him he still has tumor and your going to yael

## 2021-10-04 NOTE — TELEPHONE ENCOUNTER
1. Medication: None prescribed  2.  Imaging:                 - Reviewed today:                              - MRI Brain, outside imaging:                                            - Please see above imaging section                 - Ordered today:

## 2021-10-04 NOTE — TELEPHONE ENCOUNTER
Please offer appt with Dr. Arelis Partida after conference at 11:15am in oncology. Plan is to review imaging at conference and then discuss results/surgery with patient after.  If patient accepts please also inform oncology so patient may be added for review    Than

## 2021-10-04 NOTE — TELEPHONE ENCOUNTER
Pts wife, Bob Gonzalez, calling to find out why her  is not able to review imaging via a phone visit vs having to come in to the office. She said they were told that was an option at their last visit. Please call to discuss.

## 2021-10-05 ENCOUNTER — VIRTUAL PHONE E/M (OUTPATIENT)
Dept: NEUROLOGY | Facility: CLINIC | Age: 31
End: 2021-10-05
Payer: COMMERCIAL

## 2021-10-05 ENCOUNTER — TELEPHONE (OUTPATIENT)
Dept: SURGERY | Facility: CLINIC | Age: 31
End: 2021-10-05

## 2021-10-05 DIAGNOSIS — H49.22 6TH NERVE PALSY, LEFT: ICD-10-CM

## 2021-10-05 DIAGNOSIS — D49.6 BRAIN TUMOR (HCC): Primary | ICD-10-CM

## 2021-10-05 DIAGNOSIS — R11.2 NAUSEA AND VOMITING, INTRACTABILITY OF VOMITING NOT SPECIFIED, UNSPECIFIED VOMITING TYPE: ICD-10-CM

## 2021-10-05 PROCEDURE — 99212 OFFICE O/P EST SF 10 MIN: CPT | Performed by: NEUROLOGICAL SURGERY

## 2021-10-05 NOTE — TELEPHONE ENCOUNTER
Pt did not complete MRI Brain Neuro Navigation w/wo no fiducial and referral has been canceled. If Dr Cliff Morgan still wants pt to have this MRI, pls enter new order.   If not, pt needs to be notiified

## 2021-10-05 NOTE — TELEPHONE ENCOUNTER
Aware patient know if brain tumor diagnosis and need for surgery. Will be reviewing extensive imaging including MRI spinal survey and CT CAP and determining if further imaging needed prior to surgery.      Last week had discussed with Dr. Belkys Wilburn he'd like

## 2021-10-05 NOTE — TELEPHONE ENCOUNTER
Imaging reviewed with neuroradiology. Noticed imaging cuts missing from MRI brain with and without. Neuro navigation cuts were present. Discussed with Herminio and MRI. Advised this is the proper way to place this order. Patient will be brought back and repeat to have all cuts present. No charge to patient. Very appreciative for assistance.

## 2021-10-06 ENCOUNTER — TELEPHONE (OUTPATIENT)
Dept: SURGERY | Facility: CLINIC | Age: 31
End: 2021-10-06

## 2021-10-06 DIAGNOSIS — H49.22 LEFT ABDUCENS NERVE PALSY: ICD-10-CM

## 2021-10-06 DIAGNOSIS — D49.6 BRAIN TUMOR (HCC): Primary | ICD-10-CM

## 2021-10-07 ENCOUNTER — HOSPITAL ENCOUNTER (OUTPATIENT)
Dept: MRI IMAGING | Facility: HOSPITAL | Age: 31
Discharge: HOME | End: 2021-10-07
Attending: PHYSICIAN ASSISTANT
Payer: COMMERCIAL

## 2021-10-07 DIAGNOSIS — R11.2 NAUSEA AND VOMITING, INTRACTABILITY OF VOMITING NOT SPECIFIED, UNSPECIFIED VOMITING TYPE: ICD-10-CM

## 2021-10-07 DIAGNOSIS — H49.22 6TH NERVE PALSY, LEFT: ICD-10-CM

## 2021-10-07 DIAGNOSIS — D49.6 BRAIN TUMOR (HCC): ICD-10-CM

## 2021-10-07 DIAGNOSIS — R63.4 WEIGHT LOSS, UNINTENTIONAL: ICD-10-CM

## 2021-10-07 DIAGNOSIS — R19.7 DIARRHEA, UNSPECIFIED TYPE: ICD-10-CM

## 2021-10-07 DIAGNOSIS — R53.83 FATIGUE, UNSPECIFIED TYPE: ICD-10-CM

## 2021-10-07 PROCEDURE — A9575 INJ GADOTERATE MEGLUMI 0.1ML: HCPCS | Performed by: PHYSICIAN ASSISTANT

## 2021-10-07 RX ORDER — ACETAMINOPHEN 500 MG
1000 TABLET ORAL ONCE
Status: CANCELLED | OUTPATIENT
Start: 2021-10-07 | End: 2021-10-07

## 2021-10-07 NOTE — TELEPHONE ENCOUNTER
You are scheduled for occipital craniectomy for tumor resection  on 10/13/21 with María Greenfield    Pre-op instructions discussed with patient and surgical packet provided:  · PCP clearance is needed.   We have faxed a letter requesting medical clearance to their overnight, it is an average 3-5 days inpatient stay. · You can expect to have some facial swelling on side of surgical site, which may migrate to opposite side. This is normal.   · Post operative appointment to be made 2 and 6/8 weeks after surgery.     ·

## 2021-10-07 NOTE — TELEPHONE ENCOUNTER
Patient is scheduled forSUBOCCIPITAL CRANIECTOMY FOR TUMOR   on 7/13/21 with Dr Domingo Dasilva.     Y  form completed  Y Surgery order signed   Bianca Bryan on surgery sheet  Y Placed on outlook calendar  Y [employer] message sent to patient with sx instructions

## 2021-10-08 ENCOUNTER — NURSE ONLY (OUTPATIENT)
Dept: LAB | Facility: HOSPITAL | Age: 31
End: 2021-10-08
Attending: NEUROLOGICAL SURGERY
Payer: COMMERCIAL

## 2021-10-08 DIAGNOSIS — D49.6 BRAIN TUMOR (HCC): ICD-10-CM

## 2021-10-08 DIAGNOSIS — Z01.818 PRE-OP EXAM: ICD-10-CM

## 2021-10-08 PROCEDURE — 86900 BLOOD TYPING SEROLOGIC ABO: CPT

## 2021-10-08 PROCEDURE — 86901 BLOOD TYPING SEROLOGIC RH(D): CPT

## 2021-10-08 PROCEDURE — 86850 RBC ANTIBODY SCREEN: CPT

## 2021-10-08 PROCEDURE — 36415 COLL VENOUS BLD VENIPUNCTURE: CPT

## 2021-10-08 PROCEDURE — 85025 COMPLETE CBC W/AUTO DIFF WBC: CPT

## 2021-10-08 PROCEDURE — 93005 ELECTROCARDIOGRAM TRACING: CPT

## 2021-10-08 PROCEDURE — 80053 COMPREHEN METABOLIC PANEL: CPT

## 2021-10-08 PROCEDURE — 93010 ELECTROCARDIOGRAM REPORT: CPT | Performed by: INTERNAL MEDICINE

## 2021-10-08 PROCEDURE — 85610 PROTHROMBIN TIME: CPT

## 2021-10-08 PROCEDURE — 85730 THROMBOPLASTIN TIME PARTIAL: CPT

## 2021-10-08 NOTE — TELEPHONE ENCOUNTER
[place] of [address] [place] [address] [phone] and spoke with [facility].     Prior Authorization for inpatient surgery initiated with Dion Briones of [facility]  Requesting coverage for:Suboccipital Craniectomy for Tumor  Date of Service: 10/13/2021   Ajith

## 2021-10-08 NOTE — TELEPHONE ENCOUNTER
Per pt PCP Dr. Jess Fuentes 10/8/21    \"    ASSESSMENT/PLAN:   1.  Pre-op exam  Pt scheduled for Occipital craniectomy for tumor resection on 10/13/21 with Dr. 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

## 2021-10-11 ENCOUNTER — LAB ENCOUNTER (OUTPATIENT)
Dept: LAB | Age: 31
End: 2021-10-11
Attending: NEUROLOGICAL SURGERY
Payer: COMMERCIAL

## 2021-10-11 DIAGNOSIS — D49.6 BRAIN TUMOR (HCC): ICD-10-CM

## 2021-10-11 PROCEDURE — 87081 CULTURE SCREEN ONLY: CPT

## 2021-10-11 NOTE — TELEPHONE ENCOUNTER
[place] of [address] [place] [address] [phone] and spoke with Henri Clinton she checked and said that she needs to transfer me to the nurse reviewer.  After on hold for 1 hour and three minutes before Henri Clinton picked up I was disconnected when she put me on

## 2021-10-12 ENCOUNTER — ANESTHESIA EVENT (OUTPATIENT)
Dept: SURGERY | Facility: HOSPITAL | Age: 31
DRG: 027 | End: 2021-10-12
Payer: COMMERCIAL

## 2021-10-12 ENCOUNTER — APPOINTMENT (OUTPATIENT)
Dept: HEMATOLOGY/ONCOLOGY | Facility: HOSPITAL | Age: 31
End: 2021-10-12
Attending: NEUROLOGICAL SURGERY
Payer: COMMERCIAL

## 2021-10-12 NOTE — TELEPHONE ENCOUNTER
Received a fax from [facility]    Prior authorization request completed for: Suboccipital Craniectomy for Tumor  Authorization #[id number]  Authorization dates:10/13/21-10/16/21 (but not including 10/16/21)  CPT codes approved: 89635,19202  Number of visits/date

## 2021-10-13 ENCOUNTER — ANESTHESIA (OUTPATIENT)
Dept: SURGERY | Facility: HOSPITAL | Age: 31
DRG: 027 | End: 2021-10-13
Payer: COMMERCIAL

## 2021-10-13 ENCOUNTER — APPOINTMENT (OUTPATIENT)
Dept: CT IMAGING | Facility: HOSPITAL | Age: 31
DRG: 027 | End: 2021-10-13
Attending: NEUROLOGICAL SURGERY
Payer: COMMERCIAL

## 2021-10-13 ENCOUNTER — HOSPITAL ENCOUNTER (INPATIENT)
Facility: HOSPITAL | Age: 31
LOS: 7 days | Discharge: NURSING, REHAB OR LONG-TERM CARE | DRG: 027 | End: 2021-10-20
Attending: NEUROLOGICAL SURGERY | Admitting: NEUROLOGICAL SURGERY
Payer: COMMERCIAL

## 2021-10-13 DIAGNOSIS — D49.6 BRAIN TUMOR (HCC): Primary | ICD-10-CM

## 2021-10-13 DIAGNOSIS — H49.22 LEFT ABDUCENS NERVE PALSY: ICD-10-CM

## 2021-10-13 PROCEDURE — 00U20KZ SUPPLEMENT DURA MATER WITH NONAUTOLOGOUS TISSUE SUBSTITUTE, OPEN APPROACH: ICD-10-PCS | Performed by: NEUROLOGICAL SURGERY

## 2021-10-13 PROCEDURE — 4A10X4G MONITORING OF CENTRAL NERVOUS ELECTRICAL ACTIVITY, INTRAOPERATIVE, EXTERNAL APPROACH: ICD-10-PCS | Performed by: NEUROLOGICAL SURGERY

## 2021-10-13 PROCEDURE — 99291 CRITICAL CARE FIRST HOUR: CPT | Performed by: NURSE PRACTITIONER

## 2021-10-13 PROCEDURE — 00B60ZZ EXCISION OF CEREBRAL VENTRICLE, OPEN APPROACH: ICD-10-PCS | Performed by: NEUROLOGICAL SURGERY

## 2021-10-13 PROCEDURE — 70450 CT HEAD/BRAIN W/O DYE: CPT | Performed by: NEUROLOGICAL SURGERY

## 2021-10-13 DEVICE — TEMPORAL PLATE, MEDIUM
Type: IMPLANTABLE DEVICE | Site: HEAD | Status: FUNCTIONAL
Brand: UNIVERSAL NEURO 3

## 2021-10-13 DEVICE — DURAMATRIX 2X2 SUTURABLE: Type: IMPLANTABLE DEVICE | Site: HEAD | Status: FUNCTIONAL

## 2021-10-13 DEVICE — SCREW, AXS, SELF-DRILLING
Type: IMPLANTABLE DEVICE | Site: HEAD | Status: FUNCTIONAL
Brand: UNIVERSAL NEURO 3

## 2021-10-13 DEVICE — ADHERUS AUTOSPRAY DURAL SEALANT IS A STERILE, SINGLE-USE, ELECTROMECHANICAL, BATTERY OPERATED, DEVICE WITH INTERNAL SYSTEM COMPONENTS THAT PROVIDE AIR FLOW TO AID IN THE DELIVERY OF A SYNTHETIC, ABSORBABLE, TWO-COMPONENT HYDROGEL SEALANT SYSTEM AND ALLOW DELIVERY TO BE INTERRUPTED WITHOUT CLOGGING.
Type: IMPLANTABLE DEVICE | Site: HEAD | Status: FUNCTIONAL
Brand: ADHERUS AUTOSPRAY DURAL SEALANT

## 2021-10-13 RX ORDER — SODIUM CHLORIDE 9 MG/ML
INJECTION, SOLUTION INTRAVENOUS CONTINUOUS
Status: DISCONTINUED | OUTPATIENT
Start: 2021-10-13 | End: 2021-10-13

## 2021-10-13 RX ORDER — ONDANSETRON 2 MG/ML
INJECTION INTRAMUSCULAR; INTRAVENOUS
Status: DISCONTINUED
Start: 2021-10-13 | End: 2021-10-14

## 2021-10-13 RX ORDER — ACETAMINOPHEN 500 MG
1000 TABLET ORAL ONCE
COMMUNITY

## 2021-10-13 RX ORDER — ONDANSETRON 2 MG/ML
4 INJECTION INTRAMUSCULAR; INTRAVENOUS EVERY 6 HOURS PRN
Status: DISCONTINUED | OUTPATIENT
Start: 2021-10-13 | End: 2021-10-13

## 2021-10-13 RX ORDER — LIDOCAINE HYDROCHLORIDE 10 MG/ML
INJECTION, SOLUTION EPIDURAL; INFILTRATION; INTRACAUDAL; PERINEURAL AS NEEDED
Status: DISCONTINUED | OUTPATIENT
Start: 2021-10-13 | End: 2021-10-13 | Stop reason: SURG

## 2021-10-13 RX ORDER — ONDANSETRON 2 MG/ML
4 INJECTION INTRAMUSCULAR; INTRAVENOUS EVERY 6 HOURS PRN
Status: DISCONTINUED | OUTPATIENT
Start: 2021-10-13 | End: 2021-10-15

## 2021-10-13 RX ORDER — PROCHLORPERAZINE EDISYLATE 5 MG/ML
5 INJECTION INTRAMUSCULAR; INTRAVENOUS EVERY 8 HOURS PRN
Status: DISCONTINUED | OUTPATIENT
Start: 2021-10-13 | End: 2021-10-20

## 2021-10-13 RX ORDER — BUPIVACAINE HYDROCHLORIDE AND EPINEPHRINE 5; 5 MG/ML; UG/ML
INJECTION, SOLUTION EPIDURAL; INTRACAUDAL; PERINEURAL AS NEEDED
Status: DISCONTINUED | OUTPATIENT
Start: 2021-10-13 | End: 2021-10-13 | Stop reason: HOSPADM

## 2021-10-13 RX ORDER — HYDROCODONE BITARTRATE AND ACETAMINOPHEN 5; 325 MG/1; MG/1
1 TABLET ORAL AS NEEDED
Status: DISCONTINUED | OUTPATIENT
Start: 2021-10-13 | End: 2021-10-14 | Stop reason: SDUPTHER

## 2021-10-13 RX ORDER — METOCLOPRAMIDE HYDROCHLORIDE 5 MG/ML
10 INJECTION INTRAMUSCULAR; INTRAVENOUS AS NEEDED
Status: DISCONTINUED | OUTPATIENT
Start: 2021-10-13 | End: 2021-10-14

## 2021-10-13 RX ORDER — CEFAZOLIN SODIUM/WATER 2 G/20 ML
2 SYRINGE (ML) INTRAVENOUS ONCE
Status: COMPLETED | OUTPATIENT
Start: 2021-10-13 | End: 2021-10-13

## 2021-10-13 RX ORDER — ACETAMINOPHEN 325 MG/1
650 TABLET ORAL EVERY 4 HOURS PRN
Status: DISCONTINUED | OUTPATIENT
Start: 2021-10-13 | End: 2021-10-20

## 2021-10-13 RX ORDER — HYDROMORPHONE HYDROCHLORIDE 1 MG/ML
0.4 INJECTION, SOLUTION INTRAMUSCULAR; INTRAVENOUS; SUBCUTANEOUS EVERY 2 HOUR PRN
Status: DISCONTINUED | OUTPATIENT
Start: 2021-10-13 | End: 2021-10-20

## 2021-10-13 RX ORDER — BUTALBITAL, ACETAMINOPHEN AND CAFFEINE 50; 325; 40 MG/1; MG/1; MG/1
1 TABLET ORAL EVERY 4 HOURS PRN
Status: DISCONTINUED | OUTPATIENT
Start: 2021-10-13 | End: 2021-10-20

## 2021-10-13 RX ORDER — HYDROMORPHONE HYDROCHLORIDE 1 MG/ML
0.2 INJECTION, SOLUTION INTRAMUSCULAR; INTRAVENOUS; SUBCUTANEOUS EVERY 2 HOUR PRN
Status: DISCONTINUED | OUTPATIENT
Start: 2021-10-13 | End: 2021-10-20

## 2021-10-13 RX ORDER — DEXAMETHASONE SODIUM PHOSPHATE 4 MG/ML
4 VIAL (ML) INJECTION EVERY 8 HOURS
Status: DISCONTINUED | OUTPATIENT
Start: 2021-10-13 | End: 2021-10-15

## 2021-10-13 RX ORDER — LABETALOL HYDROCHLORIDE 5 MG/ML
10 INJECTION, SOLUTION INTRAVENOUS AS NEEDED
Status: DISCONTINUED | OUTPATIENT
Start: 2021-10-13 | End: 2021-10-20

## 2021-10-13 RX ORDER — VANCOMYCIN HYDROCHLORIDE 1 G/20ML
INJECTION, POWDER, LYOPHILIZED, FOR SOLUTION INTRAVENOUS AS NEEDED
Status: DISCONTINUED | OUTPATIENT
Start: 2021-10-13 | End: 2021-10-13 | Stop reason: HOSPADM

## 2021-10-13 RX ORDER — SODIUM CHLORIDE 0.9 % (FLUSH) 0.9 %
SYRINGE (ML) INJECTION AS NEEDED
Status: DISCONTINUED | OUTPATIENT
Start: 2021-10-13 | End: 2021-10-13 | Stop reason: HOSPADM

## 2021-10-13 RX ORDER — CEFAZOLIN SODIUM/WATER 2 G/20 ML
2 SYRINGE (ML) INTRAVENOUS EVERY 8 HOURS
Status: COMPLETED | OUTPATIENT
Start: 2021-10-14 | End: 2021-10-14

## 2021-10-13 RX ORDER — ACETAMINOPHEN 160 MG/5ML
650 SOLUTION ORAL EVERY 4 HOURS PRN
Status: DISCONTINUED | OUTPATIENT
Start: 2021-10-13 | End: 2021-10-20

## 2021-10-13 RX ORDER — SODIUM CHLORIDE 9 MG/ML
INJECTION, SOLUTION INTRAVENOUS CONTINUOUS
Status: DISCONTINUED | OUTPATIENT
Start: 2021-10-13 | End: 2021-10-20

## 2021-10-13 RX ORDER — ROCURONIUM BROMIDE 10 MG/ML
INJECTION, SOLUTION INTRAVENOUS AS NEEDED
Status: DISCONTINUED | OUTPATIENT
Start: 2021-10-13 | End: 2021-10-13 | Stop reason: SURG

## 2021-10-13 RX ORDER — DEXAMETHASONE SODIUM PHOSPHATE 4 MG/ML
VIAL (ML) INJECTION AS NEEDED
Status: DISCONTINUED | OUTPATIENT
Start: 2021-10-13 | End: 2021-10-13 | Stop reason: SURG

## 2021-10-13 RX ORDER — ACETAMINOPHEN 650 MG/1
650 SUPPOSITORY RECTAL EVERY 4 HOURS PRN
Status: DISCONTINUED | OUTPATIENT
Start: 2021-10-13 | End: 2021-10-20

## 2021-10-13 RX ORDER — ONDANSETRON 2 MG/ML
INJECTION INTRAMUSCULAR; INTRAVENOUS AS NEEDED
Status: DISCONTINUED | OUTPATIENT
Start: 2021-10-13 | End: 2021-10-13 | Stop reason: SURG

## 2021-10-13 RX ORDER — HYDROCODONE BITARTRATE AND ACETAMINOPHEN 5; 325 MG/1; MG/1
2 TABLET ORAL AS NEEDED
Status: DISCONTINUED | OUTPATIENT
Start: 2021-10-13 | End: 2021-10-14 | Stop reason: SDUPTHER

## 2021-10-13 RX ORDER — NALOXONE HYDROCHLORIDE 0.4 MG/ML
80 INJECTION, SOLUTION INTRAMUSCULAR; INTRAVENOUS; SUBCUTANEOUS AS NEEDED
Status: DISCONTINUED | OUTPATIENT
Start: 2021-10-13 | End: 2021-10-14

## 2021-10-13 RX ORDER — METHOCARBAMOL 100 MG/ML
1 INJECTION, SOLUTION INTRAMUSCULAR; INTRAVENOUS EVERY 8 HOURS PRN
Status: DISCONTINUED | OUTPATIENT
Start: 2021-10-13 | End: 2021-10-20

## 2021-10-13 RX ORDER — DOCUSATE SODIUM 100 MG/1
100 CAPSULE, LIQUID FILLED ORAL 2 TIMES DAILY
Status: DISCONTINUED | OUTPATIENT
Start: 2021-10-13 | End: 2021-10-20

## 2021-10-13 RX ORDER — LEVETIRACETAM 500 MG/5ML
500 INJECTION, SOLUTION, CONCENTRATE INTRAVENOUS EVERY 12 HOURS
Status: DISCONTINUED | OUTPATIENT
Start: 2021-10-13 | End: 2021-10-20

## 2021-10-13 RX ORDER — HYDRALAZINE HYDROCHLORIDE 20 MG/ML
10 INJECTION INTRAMUSCULAR; INTRAVENOUS
Status: DISCONTINUED | OUTPATIENT
Start: 2021-10-13 | End: 2021-10-20

## 2021-10-13 RX ORDER — HYDROMORPHONE HYDROCHLORIDE 1 MG/ML
0.8 INJECTION, SOLUTION INTRAMUSCULAR; INTRAVENOUS; SUBCUTANEOUS EVERY 2 HOUR PRN
Status: DISCONTINUED | OUTPATIENT
Start: 2021-10-13 | End: 2021-10-20

## 2021-10-13 RX ORDER — HYDROMORPHONE HYDROCHLORIDE 1 MG/ML
0.4 INJECTION, SOLUTION INTRAMUSCULAR; INTRAVENOUS; SUBCUTANEOUS EVERY 5 MIN PRN
Status: DISCONTINUED | OUTPATIENT
Start: 2021-10-13 | End: 2021-10-13

## 2021-10-13 RX ADMIN — CEFAZOLIN SODIUM/WATER 2 G: 2 G/20 ML SYRINGE (ML) INTRAVENOUS at 12:35:00

## 2021-10-13 RX ADMIN — CEFAZOLIN SODIUM/WATER 2 G: 2 G/20 ML SYRINGE (ML) INTRAVENOUS at 16:35:00

## 2021-10-13 RX ADMIN — DEXAMETHASONE SODIUM PHOSPHATE 10 MG: 4 MG/ML VIAL (ML) INJECTION at 14:25:00

## 2021-10-13 RX ADMIN — ROCURONIUM BROMIDE 50 MG: 10 INJECTION, SOLUTION INTRAVENOUS at 07:46:00

## 2021-10-13 RX ADMIN — DEXAMETHASONE SODIUM PHOSPHATE 10 MG: 4 MG/ML VIAL (ML) INJECTION at 08:08:00

## 2021-10-13 RX ADMIN — SODIUM CHLORIDE: 9 INJECTION, SOLUTION INTRAVENOUS at 15:15:00

## 2021-10-13 RX ADMIN — SODIUM CHLORIDE: 9 INJECTION, SOLUTION INTRAVENOUS at 21:10:00

## 2021-10-13 RX ADMIN — LIDOCAINE HYDROCHLORIDE 50 MG: 10 INJECTION, SOLUTION EPIDURAL; INFILTRATION; INTRACAUDAL; PERINEURAL at 07:46:00

## 2021-10-13 RX ADMIN — ONDANSETRON 4 MG: 2 INJECTION INTRAMUSCULAR; INTRAVENOUS at 20:22:00

## 2021-10-13 RX ADMIN — CEFAZOLIN SODIUM/WATER 2 G: 2 G/20 ML SYRINGE (ML) INTRAVENOUS at 20:03:00

## 2021-10-13 RX ADMIN — SODIUM CHLORIDE: 9 INJECTION, SOLUTION INTRAVENOUS at 07:41:00

## 2021-10-13 RX ADMIN — CEFAZOLIN SODIUM/WATER 2 G: 2 G/20 ML SYRINGE (ML) INTRAVENOUS at 08:35:00

## 2021-10-13 NOTE — ANESTHESIA PROCEDURE NOTES
Arterial Line  Performed by: Alireza Morris MD  Authorized by: Alireza Morris MD     General Information and Staff    Procedure Start:   Anesthesiologist: Alireza Morris MD  Patient Location:  OR  Indication: continuous blood pressure m

## 2021-10-13 NOTE — H&P
History & Physical Examination    Patient Name: Diana Smith  MRN: [id number]  CSN: [id number]  YOB: 1990    Diagnosis: Fourth ventricular mass    Present Illness: History of headache and nausea with vomiting since December.   Eventually Mother    • No Known Problems Daughter    • No Known Problems Son    • No Known Problems Maternal Grandmother    • No Known Problems Maternal Grandfather    • No Known Problems Paternal Grandmother    • No Known Problems Paternal Grandfather    • No Known

## 2021-10-13 NOTE — ANESTHESIA PREPROCEDURE EVALUATION
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     Anesthetic Complications  (-) history of anesthetic complications         GI/Hepatic/Renal    Negative GI/hepatic/renal surgeon and patient. Comment: Risks include but limited to oral and dental injury, nausea/vomiting, anaphylaxis, prolonged ventilation and myocardial infarction. All questions were answered to the patient's satisfaction.  Patient expressed understanding

## 2021-10-13 NOTE — CONSULTS
[facility]  Neurocritical Care Consult Note    Юлияtegan Allen Patient Status:  Inpatient    1990 MRN [id number]   [facility] SURGERY Attending Curt Antunez MD   Hosp Day # 0 PCP MD Gelacio Red Socioeconomic History      Marital status:     Tobacco Use      Smoking status: Never Smoker      Smokeless tobacco: Never Used    Vaping Use      Vaping Use: Never used    Substance and Sexual Activity      Alcohol use: Yes        Comment: rare lower extremity edema. GI:                         Denies constipation, heartburn or melena. :                       Denies dysuria or hematuria. Skin:                     Denies rashes or open areas. Neuro:                   As per HPI    All other s family, and clinical staff. Thank you for allowing me to participate in the care of this patient.      Albert Busch MD  Medical Director of System Neurosciences  Chief, Section of [address] [facility]

## 2021-10-13 NOTE — ANESTHESIA PROCEDURE NOTES
Airway  Date/Time: 10/13/2021 7:42 AM  Urgency: elective    Airway not difficult    General Information and Staff    Patient location during procedure: OR  Anesthesiologist: Armond García MD  Performed: anesthesiologist     Indications and Patient C

## 2021-10-14 ENCOUNTER — APPOINTMENT (OUTPATIENT)
Dept: MRI IMAGING | Facility: HOSPITAL | Age: 31
DRG: 027 | End: 2021-10-14
Attending: NEUROLOGICAL SURGERY
Payer: COMMERCIAL

## 2021-10-14 PROCEDURE — 99291 CRITICAL CARE FIRST HOUR: CPT | Performed by: INTERNAL MEDICINE

## 2021-10-14 PROCEDURE — 70551 MRI BRAIN STEM W/O DYE: CPT | Performed by: NEUROLOGICAL SURGERY

## 2021-10-14 PROCEDURE — 99252 IP/OBS CONSLTJ NEW/EST SF 35: CPT | Performed by: HOSPITALIST

## 2021-10-14 RX ORDER — FOLIC ACID 1 MG/1
1 TABLET ORAL
Status: DISCONTINUED | OUTPATIENT
Start: 2021-10-14 | End: 2021-10-20

## 2021-10-14 RX ORDER — FAMOTIDINE 20 MG/1
20 TABLET ORAL 2 TIMES DAILY
Status: DISCONTINUED | OUTPATIENT
Start: 2021-10-14 | End: 2021-10-20

## 2021-10-14 RX ORDER — CHLORPROMAZINE HYDROCHLORIDE 25 MG/1
25 TABLET, FILM COATED ORAL 3 TIMES DAILY PRN
Status: DISCONTINUED | OUTPATIENT
Start: 2021-10-14 | End: 2021-10-20

## 2021-10-14 RX ORDER — FAMOTIDINE 10 MG/ML
20 INJECTION, SOLUTION INTRAVENOUS 2 TIMES DAILY
Status: DISCONTINUED | OUTPATIENT
Start: 2021-10-14 | End: 2021-10-20

## 2021-10-14 NOTE — OCCUPATIONAL THERAPY NOTE
OCCUPATIONAL THERAPY EVALUATION - INPATIENT     Room Number: 0568/9479-H  Evaluation Date: 10/14/2021  Type of Evaluation: Initial  Presenting Problem: brain tumor    Physician Order: IP Consult to Occupational Therapy  Reason for Therapy: ADL/IADL Dysfunc Perception Status:   WFL - within functional limits    SENSATION  Light touch:  intact    Communication: Pt able to communicate needs throughout session. Behavioral/Emotional/Social: Pt agreeable to therapy.  Pt reported dizziness and nausea throughout s A X 2 to complete supine<>sit transfer. Pt reported dizziness when sitting and wanted to keep eyes shut. Pt denies wanting to stand due to increased dizziness. Pt able to complete log rolling in bed with MOD A to change linens.      Transfers  Supine to E assessments, several treatment options    Overall Complexity MODERATE     OT Discharge Recommendations: Acute rehabilitation  OT Device Recommendations: TBD    PLAN  OT Treatment Plan: Balance activities; Energy conservation/work simplification techniques;A

## 2021-10-14 NOTE — PROGRESS NOTES
[facility]  Neurosurgery Progress Note    Chica Kumari Patient Status:  Inpatient    1990 MRN [id number]   [facility] 6NE-A Attending Sri Dasilva MD   Hosp Day # 1 PCP Jayy Sheldon MD     Chief Complaint:  No chief hydrocephalus.       Retention cyst in the right maxillary sinus is noted.       Mastoid air cells are unremarkable.       Orbits are overall unremarkable.                                    Impression   CONCLUSION:  Postoperative changes with sequelae of

## 2021-10-14 NOTE — ANESTHESIA POSTPROCEDURE EVALUATION
Ken Patient Status:  Inpatient   Age/Gender 26year old male MRN [id number]   [facility] SURGERY Attending Devika Sharma MD   Hosp Day # 0 PCP Rosaura Person MD       Anesthesia Post-op Note    An Hansen

## 2021-10-14 NOTE — PHYSICAL THERAPY NOTE
PHYSICAL THERAPY EVALUATION - INPATIENT     Room Number: 8007/3131-V  Evaluation Date: 10/14/2021  Type of Evaluation: Initial  Physician Order: PT Eval and Treat    Presenting Problem: S/p suboccipital craniectomy for tumor 10/13  Reason for Therapy training  Rehab Potential : Good  Frequency (Obs): 3-5x/week  Number of Visits to Meet Established Goals: 7      CURRENT GOALS    Goal #1 Patient is able to demonstrate supine - sit EOB @ level: supervision     Goal #2 Patient is able to demonstrate transf tested    ADDITIONAL TESTS                                    ACTIVITY TOLERANCE  Pulse: 79  Heart Rate Source: Monitor  Resp: 24  BP: 116/65 (Previous in supine 110/62)  BP Location: Right arm  BP Method: Automatic  Patient Position: Sitting    O2 WALK  O dizziness. Bp taken and was 116/65. Had pt sit on eob for ~ 10 min trying to allow dizziness to pass, but pt reports that it was continuing and he could not attempt standing to change linens on his bed or transfer to chair. Returned to supine.     Transfe

## 2021-10-14 NOTE — BRIEF OP NOTE
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

## 2021-10-14 NOTE — SLP NOTE
ADULT SWALLOWING EVALUATION    ASSESSMENT    ASSESSMENT/OVERALL IMPRESSION:  Patient is a 25 y/o male admitted for craniotomy  For tumor resection and PMHx significant for CN VI palsy. SLP orders received to evaluate oropharyngeal swallow.  Patient alert in Regular; Thin liquids       Patient/Family Goals: none stated    SWALLOWING HISTORY  Current Diet Consistency: NPO  Dysphagia History: as above  Imaging Results:   [facility] 10/13/21  CONCLUSION:  Postoperative changes with sequelae of suboccipital craniectomy is Patient was not masked.    Thank you for your referral.   If you have any questions, please contact RADHA Simental

## 2021-10-14 NOTE — PROGRESS NOTES
[facility]  Neurocritical Care APRN Progress Note    NAME: Phil Jeffers - ROOM: 8938/5573-S - MRN: [id number] - Age: 26year old - :1990    History Of Present Illness:  Phil Jeffers is a 26year old male with PMHx si age  Neck: Supple, symmetrical, trachea midline, no carotid bruits, adenopathy, or JVD  Lungs: Clear to auscultation bilaterally, respirations unlabored  Heart: Regular rate and rhythm, S1 and S2 normal, no murmur, rub or gallop  Abdomen: Soft, non-tender, This mass demonstrates marked restricted diffusion. Minimal peripheral enhancement is likely due to veins in this region. No internal enhancement is noted. Associated significant mass effect is noted. However, there is no hydrocephalus.   This is noted There are mild degenerative changes in the thoracic spine. There is no central canal stenosis or cord signal abnormality.     Dictated by (CST): Sofie Raymond MD on 9/29/2021 at 4:14 PM     Finalized by (CST): Sofie Raymond MD on 9/29/2021 at 4:20 PM cystic without any internal enhancement demonstrates marked restricted diffusion and is highly suspicious for an epidermoid.     Dictated by (CST): Gene Adhikari MD on 10/07/2021 at 2:09 PM     Finalized by (CST): Gene Adhikari MD on 10/07/2021 at

## 2021-10-14 NOTE — CONSULTS
[facility]  [address] Patient Status:  Inpatient    1990 MRN [id number]   [facility] 6NE-A Attending Reji Douglas MD   Hosp Day # 1 PCP Neftali Anderson MD     Reason for consult: medical management No current facility-administered medications on file prior to encounter. acetaminophen 500 MG Oral Tab, Take 1,000 mg by mouth one time. , Disp: , Rfl:   HYDROcodone-acetaminophen (NORCO)  MG Oral Tab, Take 1 tablet by mouth every 4 (four) hours as n Labs:  Recent Labs   Lab 10/08/21  1301 10/13/21  0641 10/14/21  0120   WBC 8.1  --  12.3*   HGB 11.9*  --  9.9*   MCV 88.1  --  88.5   .0*  --  396.0   INR 1.21* 1.23*  --        Recent Labs   Lab 10/08/21  1301 10/14/21  0120   GLU 93 151*   B

## 2021-10-15 PROCEDURE — 99291 CRITICAL CARE FIRST HOUR: CPT | Performed by: INTERNAL MEDICINE

## 2021-10-15 PROCEDURE — 99232 SBSQ HOSP IP/OBS MODERATE 35: CPT | Performed by: HOSPITALIST

## 2021-10-15 RX ORDER — ONDANSETRON 2 MG/ML
8 INJECTION INTRAMUSCULAR; INTRAVENOUS EVERY 6 HOURS PRN
Status: DISCONTINUED | OUTPATIENT
Start: 2021-10-15 | End: 2021-10-20

## 2021-10-15 RX ORDER — MECLIZINE HCL 12.5 MG/1
12.5 TABLET ORAL 3 TIMES DAILY PRN
Status: DISCONTINUED | OUTPATIENT
Start: 2021-10-15 | End: 2021-10-20

## 2021-10-15 RX ORDER — DEXAMETHASONE SODIUM PHOSPHATE 4 MG/ML
4 VIAL (ML) INJECTION EVERY 12 HOURS
Status: DISCONTINUED | OUTPATIENT
Start: 2021-10-15 | End: 2021-10-19

## 2021-10-15 RX ORDER — ACETAMINOPHEN 10 MG/ML
1000 INJECTION, SOLUTION INTRAVENOUS EVERY 6 HOURS
Status: COMPLETED | OUTPATIENT
Start: 2021-10-15 | End: 2021-10-16

## 2021-10-15 RX ORDER — ORPHENADRINE CITRATE 30 MG/ML
30 INJECTION INTRAMUSCULAR; INTRAVENOUS EVERY 6 HOURS
Status: DISCONTINUED | OUTPATIENT
Start: 2021-10-15 | End: 2021-10-19

## 2021-10-15 NOTE — OCCUPATIONAL THERAPY NOTE
OCCUPATIONAL THERAPY TREATMENT NOTE - INPATIENT     Room Number: 4117/5722-K  Session: 1   Number of Visits to Meet Established Goals: 6    Presenting Problem: brain tumor    History related to current admission: Patient is 26year old male admitted on 10/ ASSESSMENT  Supine to Sit : Moderate assistance  Sit to Stand: Not tested    Skilled Therapy Provided:   Pt received seated in long sit in bed.      Transfers  Supine to EOB: MIN A  Sit to Stand - EOB: MOD A  Sit to Stand - Chair: MOD A x2  Sit to Stand - T The pt continues to funciton below previous functional level and would benefit from skilled inpatient OT to address the above deficits, maximizing the patient's ability to return safely to prior level of function.  Current recommendation of acute rehab cont

## 2021-10-15 NOTE — PROGRESS NOTES
[facility]  Neurosurgery Progress Note    Arelis Garcia Patient Status:  Inpatient    1990 MRN [id number]   [facility] 6NE-A Attending Leslie Sinha MD   Hosp Day # 2 PCP Lowell Reyes MD     Chief Complaint:  No chief technique. Francia Chew is likely a component of mild hydrocephalus present.  Postcontrast imaging was not   performed.                        Impression   CONCLUSION:  Expected postoperative changes of suboccipital craniectomy and mass resection.  Some areas of

## 2021-10-15 NOTE — PLAN OF CARE
Sp crani yesterday. Follow up MRI planned for this evening. Pt has been medicated for HA, nausea and hiccups prn. Ice applied to posterior head. Wells in place, to be dc'ed after MRI. Family at bedside. VSS. Family at bedside.   Will continue to monit

## 2021-10-15 NOTE — CM/SW NOTE
PT recommendations noted. I spoke with patient RN regarding the appropriateness of a physical medicine evaluation at this time. We will hold off at this time. I spoke with Tiff Doan from [place] [id number].   When patient is more appropriate for a physical medicine evalua

## 2021-10-15 NOTE — PROGRESS NOTES
[facility]     Hospitalist Progress Note     Unk Alissa Patient Status:  Inpatient    1990 MRN [id number]   [facility] 6NE-A Attending Dami Jackson MD   Hosp Day # 2 PCP Derrick Nava MD     Chief Complaint: Medical hours. Cardiac  No results for input(s): TROP, PBNP in the last 168 hours. Creatinine Kinase  No results for input(s): CK in the last 168 hours. Inflammatory Markers  No results for input(s): CRP, ERLIN, LDH, DDIMER in the last 168 hours.     Imaging

## 2021-10-15 NOTE — PLAN OF CARE
Assumed care of pt at [address]. S/P Suboccipital Crani, POD #2. A/O x4  Neuros Q2, L eye medial deviation, nystagmus and mild double vision, improved. Mild L facial flattening present. Speech delayed slow dysarthic, family able to easily comprehend.  H/A and activity  - Administer anti-seizure medications as ordered  - Monitor neurological status  10/15/2021 0807 by Rhonda Hendricks RN  Outcome: Progressing  10/15/2021 0759 by Rhonda Hendricks RN  Outcome: Progressing  Goal: Remains free of injury re

## 2021-10-15 NOTE — PROGRESS NOTES
[facility]  Neurocritical Care Progress Note     Mirian Said Patient Status:  Inpatient    1990 MRN [id number]   [facility] 6NE-A Attending Juli Dior MD   Hosp Day # 2 PCP MD Tia Castañeda PRN  0.9% NaCl infusion, , Intravenous, Continuous  ondansetron (ZOFRAN) injection 4 mg, 4 mg, Intravenous, Q6H PRN  Prochlorperazine Edisylate (COMPAZINE) injection 5 mg, 5 mg, Intravenous, Q8H PRN  butalbital-acetaminophen-caffeine (FIORICET) per tab 1 t RA  Renal:  IVFs, monitor BUN/Cr  GI:  PO as tolerated  Heme/ID:  Afebrile, trend leukocytosis  Endocrine:  Monitor glucose, sliding scale insulin prn  DVT Prophylaxis:  SCD’s    Goals of the Day: neurochecks  A total of 40 minutes of critical care time (e

## 2021-10-15 NOTE — CONSULTS
Seen and examined. Recommend acute  Note to follow shortly. Discussed with wife, agreeable to acute rehab. All questions answered.

## 2021-10-15 NOTE — SLP NOTE
Attempted to see patient for follow-up as per plan of care however patient working with PT/OT. Will follow up at another time.    Luh Fry MS CCC-SLP/L, pager [phone]  Speech-LanguagePathologist

## 2021-10-15 NOTE — PHYSICAL THERAPY NOTE
PHYSICAL THERAPY TREATMENT NOTE - INPATIENT    Room Number: 6488/3890-X     Session: 1     Number of Visits to Meet Established Goals: 7    Presenting Problem: S/p suboccipital craniectomy for tumor 10/13    ASSESSMENT   The pt is seen for physical ther 5  Location: Head/neck and R hip pain   Management Techniques: Activity promotion; Body mechanics;Repositioning    BALANCE                                                                                                                       Static Sitting: Sitting and Supine     Repetitions   10   Sets   1     Patient End of Session: Up in chair;Needs met;Call light within reach; All patient questions and concerns addressed       Therapist PPE: surgical mask, goggles and gloves donned during session    Patien

## 2021-10-16 PROCEDURE — 99232 SBSQ HOSP IP/OBS MODERATE 35: CPT | Performed by: HOSPITALIST

## 2021-10-16 RX ORDER — HEPARIN SODIUM 5000 [USP'U]/ML
5000 INJECTION, SOLUTION INTRAVENOUS; SUBCUTANEOUS EVERY 12 HOURS SCHEDULED
Status: DISCONTINUED | OUTPATIENT
Start: 2021-10-16 | End: 2021-10-20

## 2021-10-16 RX ORDER — ACETAMINOPHEN 10 MG/ML
1000 INJECTION, SOLUTION INTRAVENOUS EVERY 6 HOURS SCHEDULED
Status: COMPLETED | OUTPATIENT
Start: 2021-10-16 | End: 2021-10-17

## 2021-10-16 NOTE — PROGRESS NOTES
[facility]  Neurosurgery Progress Note    Madrid Proper Patient Status:  Inpatient    1990 MRN [id number]   [facility] 6NE-A Attending Louis Adame MD   Hosp Day # 3 PCP Priyanka Hilliard MD     Chief Complaint:  No chief

## 2021-10-16 NOTE — PROGRESS NOTES
PHYSICAL MEDICINE AND REHABILITATION CONSULTATION       Location [facility] 6NE-A Attending Tan Salinas MD   Hosp Day # 2 PCP Lolly Pham MD     Patient Identification  Elton Marino is a 26year old male.   :  1990  Admit Date: Once  famotidine (PEPCID) tab 20 mg, 20 mg, Oral, BID   Or  famoTIDine (PEPCID) injection 20 mg, 20 mg, Intravenous, BID  [COMPLETED] ceFAZolin sodium (ANCEF/KEFZOL) 2 GM/20ML premix IV syringe 2 g, 2 g, Intravenous, Once  HYDROmorphone HCl (DILAUDID) 1 MG History   Problem Relation Age of Onset   • No Known Problems Father    • No Known Problems Mother    • No Known Problems Daughter    • No Known Problems Son    • No Known Problems Maternal Grandmother    • No Known Problems Maternal Grandfather    • No Kn lids intact, pupils are equal and round  ENMT: external inspection of ears and nose within normal limits.  Normal functional hearing  Neck: supple, symmetrical, no thyromegaly appreciated  Lymph: no cervical and no axillary lymphadenopathy  Lungs: Non labor rehab criteria to qualify for acute inpatient rehab:  • This patient will require an intensive and coordinated interdisciplinary team approach  • Patient requires and shall be able to participate in 3 hours of therapy 5 days a week.   • The patient is reaso

## 2021-10-16 NOTE — PLAN OF CARE
Neuro exams q2 hrs as charted. Left eye with inward and downward gaze, can overcome and perform EOM normally. Right eye does not cross midline to move to the right. Bilateral nystagmus present. Occasional blurred vision.  Medicated for nausea and incision

## 2021-10-16 NOTE — PROGRESS NOTES
[facility]     Hospitalist Progress Note     Mitchel Gamboa Patient Status:  Inpatient    1990 MRN [id number]   [facility] 6NE-A Attending Chris Senior MD   Hosp Day # 3 PCP Kaykay Marshall MD     Chief Complaint: Medical hours.    Inflammatory Markers  No results for input(s): CRP, ERLIN, LDH, DDIMER in the last 168 hours. Imaging: Imaging data reviewed in Epic.     Medications:   • dexamethasone Sodium Phosphate  4 mg Intravenous Q12H   • Orphenadrine Citrate  30 mg Intra

## 2021-10-17 PROCEDURE — 99232 SBSQ HOSP IP/OBS MODERATE 35: CPT | Performed by: HOSPITALIST

## 2021-10-17 RX ORDER — CLONAZEPAM 0.25 MG/1
0.12 TABLET, ORALLY DISINTEGRATING ORAL 2 TIMES DAILY
Status: DISCONTINUED | OUTPATIENT
Start: 2021-10-17 | End: 2021-10-18

## 2021-10-17 NOTE — SIGNIFICANT EVENT
Pt neurologically stable. Nausea persists, but less. Patient dangled x2, dizziness worsens with sitting up. Pain well controlled with scheduled medications. Meclazine helpful for dizziness. Verbalizes feeling better overall.   No appetite, but did take

## 2021-10-17 NOTE — PROGRESS NOTES
[facility]  Neurosurgery Progress Note  10/17/2021    Duy Neither Patient Status:  Inpatient    1990 MRN [id number]   [facility] 6NE-A Attending Jack Melgoza MD   Hosp Day # 4 PCP Kenny Delaney MD     SUBJECTIVE:  Kristen Acevedo

## 2021-10-17 NOTE — PHYSICAL THERAPY NOTE
Attempted to see pt for PT. Per RN, pt sat in chair for only 5 min due to nauseated and dizzy in chair earlier, just returned to bed 30 min ago. Will see pt next day.

## 2021-10-17 NOTE — PROGRESS NOTES
[facility]     Hospitalist Progress Note     Phil Jeffers Patient Status:  Inpatient    1990 MRN [id number]   [facility] 6NE-A Attending Michael Fair MD   Hosp Day # 4 PCP Yolanda Hughes MD     Chief Complaint: Medical hours.    Inflammatory Markers  No results for input(s): CRP, ERLIN, LDH, DDIMER in the last 168 hours. Imaging: Imaging data reviewed in Epic.     Medications:   • Heparin Sodium (Porcine)  5,000 Units Subcutaneous 2 times per day   • dexamethasone Sodium

## 2021-10-17 NOTE — PLAN OF CARE
Neuro exam q2 hrs as charted. Scheduled ofirmev and muscle relaxant given for discomfort, as well as dilaudid. Nausea issues continue, PRN antiemetics given. Straight cathed for urinary retention.

## 2021-10-18 PROCEDURE — 99232 SBSQ HOSP IP/OBS MODERATE 35: CPT | Performed by: HOSPITALIST

## 2021-10-18 PROCEDURE — 3008F BODY MASS INDEX DOCD: CPT | Performed by: HOSPITALIST

## 2021-10-18 PROCEDURE — 3078F DIAST BP <80 MM HG: CPT | Performed by: HOSPITALIST

## 2021-10-18 PROCEDURE — 3074F SYST BP LT 130 MM HG: CPT | Performed by: HOSPITALIST

## 2021-10-18 RX ORDER — HYDROCODONE BITARTRATE AND ACETAMINOPHEN 10; 325 MG/1; MG/1
1 TABLET ORAL EVERY 4 HOURS PRN
Status: DISCONTINUED | OUTPATIENT
Start: 2021-10-18 | End: 2021-10-20

## 2021-10-18 RX ORDER — CLONAZEPAM 0.25 MG/1
0.25 TABLET, ORALLY DISINTEGRATING ORAL 2 TIMES DAILY
Status: COMPLETED | OUTPATIENT
Start: 2021-10-18 | End: 2021-10-19

## 2021-10-18 NOTE — PHYSICAL THERAPY NOTE
PHYSICAL THERAPY TREATMENT NOTE - INPATIENT    Room Number: 8121/7108-V     Session: 2    Number of Visits to Meet Established Goals: 7    Presenting Problem: S/p suboccipital craniectomy for tumor 10/13    ASSESSMENT   The pt is seen for physical thera 10/18/21      SUBJECTIVE  Pt c/o dizziness, nausea and increased pain with activity.     OBJECTIVE  Precautions: Bed/chair alarm    WEIGHT BEARING RESTRICTION  Weight Bearing Restriction: None                PAIN ASSESSMENT   Ratin  Location: Posterior with use of bedrail.   Long sit>transfer to EOB: Min A, hand held assist, increased time due to severe dizziness    Transfer Mobility:  Sit<>Stand: completed x3 reps with min-mod assist   Gait: Max A x1 initially but with c/o increasing pain, dizziness pt e

## 2021-10-18 NOTE — PAYOR COMM NOTE
--------------  CONTINUED STAY REVIEW    Payor: [facility] PPO  Subscriber #:  [id number]  Authorization Number: [id number]    Admit date: 10/13/21  Admit time:  5:26 AM    Admitting Physician: David Cruz MD  Attending Physician:  David Cruz MD 10/18/21 0100 — 71 17 117/55 93 % — — — AT   10/18/21 0000 97.8 °F (36.6 °C) 78 14 106/60 93 % — None (Room air) — AT     Pt examined, he was able to sit up for 1.5 hrs earlier.   He continues to have nausea but is tolerating some foods      Respiratory: folic acid (FOLVITE) tab 1 mg     Date Action Dose Route User    10/18/2021 1004 Given 1 mg Oral Rene Coad, RN      Heparin Sodium (Porcine) 5000 UNIT/ML injection 5,000 Units     Date Action Dose Route User    10/18/2021 1004 Given 5,000 Units Sub

## 2021-10-18 NOTE — PROGRESS NOTES
[facility]     Hospitalist Progress Note     Olga Mcghee Patient Status:  Inpatient    1990 MRN [id number]   [facility] 6NE-A Attending Za Lance MD   Hosp Day # 5 PCP Cachorro Rowe MD     Chief Complaint: Medical PBNP in the last 168 hours. Creatinine Kinase  No results for input(s): CK in the last 168 hours. Inflammatory Markers  No results for input(s): CRP, ERLIN, LDH, DDIMER in the last 168 hours. Imaging: Imaging data reviewed in Epic.     Medications:

## 2021-10-18 NOTE — PLAN OF CARE
Neuro exam q4 hrs at night as charted in epic. Dizziness with change of position remains an issue as well as blurred vision and hiccups. Thorazine x1 dose given. Able to urinate, no straight cath required. At approx.  0100 patient was heard yelling at his

## 2021-10-18 NOTE — PAYOR COMM NOTE
--------------  CONTINUED STAY REVIEW    Payor: MARY PPELANA  Subscriber #:  [id number]  Authorization Number: [id number]    Admit date: 10/13/21  Admit time:  5:26 AM    Admitting Physician: Rosa Otero MD  Attending Physician:  Rosa Otero MD

## 2021-10-18 NOTE — PROGRESS NOTES
[facility]  Neurosurgery Progress Note    Kwadwo Long Patient Status:  Inpatient    1990 MRN [id number]   [facility] 6NE-A Attending Dioni Kerns MD   Hosp Day # 5 PCP Sydni Snyder MD     Chief Complaint:  No chief

## 2021-10-18 NOTE — OPERATIVE REPORT
[facility]    OPERATIVE REPORT    Patient:  Phil Jeffers;  YOB: 1990     CSN:  [id number]; Medical Record Number:  [id number]    Admission Date:  10/13/2021 Operation Date:  10/13/2021    . ..........................     Operating P sequential compression boots as well as appropriate physiological monitoring were established. Leads were placed for some of the neuro monitoring.   He was then placed in three-point pin fixation and carefully rolled over to the prone position on the opera the bur holes. A craniotome was then used to cut a free bone flap. The dura was then opened to expose the inferior vermis, the midline cerebellar tonsils, and the superior portion of the cervical medullary junction below the obex.   Tumor was present in b and out laterally at the fourth ventricular exit zones.     With a near gross total resection accomplished and only microscopic amounts of tumor left behind the closure consisted of placing Surgicel over the raw surfaces of the medial aspect of the cerebell

## 2021-10-18 NOTE — PROGRESS NOTES
[facility]    Progress Note    Kwadwo Long Patient Status:  Inpatient    1990 MRN [id number]   [facility] 6NE-A Attending Dioni Kerns MD   Hosp Day # 5 PCP MD Charlie Adrianemili Long is a(n) 26year old Gait: Max A x1 initially but with c/o increasing pain, dizziness pt exhibits increasing sway and posterior lean, requiring up to Max A x2      Plan: to acute rehab when cleared by medical team      Sawyer Sheridan MD  10/18/2021

## 2021-10-19 ENCOUNTER — TELEPHONE (OUTPATIENT)
Dept: SURGERY | Facility: CLINIC | Age: 31
End: 2021-10-19

## 2021-10-19 PROCEDURE — 99232 SBSQ HOSP IP/OBS MODERATE 35: CPT | Performed by: HOSPITALIST

## 2021-10-19 RX ORDER — TAMSULOSIN HYDROCHLORIDE 0.4 MG/1
0.4 CAPSULE ORAL DAILY
Status: DISCONTINUED | OUTPATIENT
Start: 2021-10-19 | End: 2021-10-20

## 2021-10-19 RX ORDER — DEXAMETHASONE 4 MG/1
4 TABLET ORAL DAILY
Status: DISCONTINUED | OUTPATIENT
Start: 2021-10-20 | End: 2021-10-20

## 2021-10-19 RX ORDER — DEXAMETHASONE 2 MG/1
2 TABLET ORAL DAILY
Status: DISCONTINUED | OUTPATIENT
Start: 2021-10-22 | End: 2021-10-20

## 2021-10-19 RX ORDER — CYCLOBENZAPRINE HCL 10 MG
10 TABLET ORAL 3 TIMES DAILY PRN
Status: DISCONTINUED | OUTPATIENT
Start: 2021-10-19 | End: 2021-10-20

## 2021-10-19 NOTE — PROGRESS NOTES
[facility]  Neurosurgery Progress Note    Chaitanya Malcolm Patient Status:  Inpatient    1990 MRN [id number]   [facility] 6NE-A Attending Luigi Ruiz MD   Hosp Day # 6 PCP [place] MD Kasandra     Chief Complaint:  No chief

## 2021-10-19 NOTE — OCCUPATIONAL THERAPY NOTE
OCCUPATIONAL THERAPY TREATMENT NOTE - INPATIENT     Room Number: 1611/1247-N  Session: 2   Number of Visits to Meet Established Goals: 6    Presenting Problem: brain tumor    History related to current admission:  Patient is 26year old male admitted on 10 CK    FUNCTIONAL TRANSFER ASSESSMENT  Supine to Sit : Supervision  Sit to Stand: Minimum assistance    Skilled Therapy Provided:   Pt received seated in chair with spouse present. Pt required MOD verbal cues from therapist to wait before standing.  Pt stood Plan: Balance activities; Energy conservation/work simplification techniques;ADL training;IADL training;Visual perceptual training;Functional transfer training;UE strengthening/ROM; Endurance training;Cognitive reorientation;Patient/Family education;Patient/

## 2021-10-19 NOTE — TELEPHONE ENCOUNTER
S:  Received notification to conduct post op patient outreach. B:  Patient underwent surgery on 813/21.  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

## 2021-10-19 NOTE — PLAN OF CARE
Assumed care of pt at 1930  Neuro as charted    Stood at bedside multiple times throughout the night to attempt to void- continuing to retain urine.  Straight cath x2

## 2021-10-19 NOTE — PROGRESS NOTES
Patient up to chair x 3. Ambulated in hallway to desk and back to chair. Utilizing eye patch, switching eyes q 2 hours while awake. Started Norco.  No antiemetics needed today. Cooperative. Needed encouragement, but did all tasks asked.

## 2021-10-19 NOTE — PLAN OF CARE
Received into [facility] via [facility] from [address] accompanied by Mother. A&OX4. Tele - SB. Denies pain or discomfort. Hiccups & Dry heaving noted. Taking in [facility]. Up to BR with asst X2, walker & gait belt. Resting comfortably. Will continue to monitor.

## 2021-10-19 NOTE — OCCUPATIONAL THERAPY NOTE
Chart reviewed and checked in with RN. Attempted to see Pt, RN reports that Pt is asleep and is requesting that Pt continues to rest and be seen another time. OT will continue to follow as Pt is appropriate and schedule allows.

## 2021-10-19 NOTE — CM/SW NOTE
CM confirmed with Chioma Townsend from [facility] patient will be ready for dc to [facility] tomorrow pending negative Covid test. Insurance authorization received. Rapid Covid test ordered today. CM relayed all information to wife, Amauri Padilla.  All questions answe

## 2021-10-19 NOTE — PROGRESS NOTES
[facility]     Hospitalist Progress Note     Ann Giordano Patient Status:  Inpatient    1990 MRN [id number]   [facility] 6NE-A Attending Zak Brunson MD   Hosp Day # 6 PCP Otis Roldan MD     Chief Complaint: Medical mg/dL (L)).     Recent Labs   Lab 10/13/21  0641   PTP 15.8*   INR 1.23*            COVID-19 Lab Results    COVID-19  Lab Results   Component Value Date    COVID19 Not Detected 10/11/2021    COVID19 Not Detected 09/24/2021       Pro-Calcitonin  No results f

## 2021-10-19 NOTE — PAYOR COMM NOTE
--------------  CONTINUED STAY REVIEW    Payor: MARY ROSS  Subscriber #:  [id number]  Authorization Number: [id number]    Admit date: 10/13/21  Admit time:  5:26 AM    Admitting Physician: Rosa Otero MD  Attending Physician:  Rosa Otero MD transfer to floor, plan for rehab dc   Medical management per hospitalist  DVT prophylaxis- SCDs TEDs Heparin           MEDICATIONS ADMINISTERED IN LAST 1 DAY:  chlorproMAZINE (THORAZINE) tab 25 mg     Date Action Dose Route User    10/18/2021 2010 Given 2 User    10/19/2021 0926 New Bag 500 mg Intravenous Jose Thorpe RN    10/18/2021 2101 New Bag 500 mg Intravenous Nirav May RN    10/18/2021 1004 New Bag 500 mg Intravenous Meghann Cruz RN      meclizine (ANTIVERT) tab 12.5 mg     Date Acti

## 2021-10-20 VITALS
WEIGHT: 170.44 LBS | HEART RATE: 80 BPM | TEMPERATURE: 98 F | OXYGEN SATURATION: 97 % | RESPIRATION RATE: 25 BRPM | SYSTOLIC BLOOD PRESSURE: 100 MMHG | BODY MASS INDEX: 24.4 KG/M2 | HEIGHT: 70 IN | DIASTOLIC BLOOD PRESSURE: 50 MMHG

## 2021-10-20 PROCEDURE — 99232 SBSQ HOSP IP/OBS MODERATE 35: CPT | Performed by: HOSPITALIST

## 2021-10-20 RX ORDER — DEXAMETHASONE 4 MG/1
TABLET ORAL
Qty: 3 TABLET | Refills: 0 | Status: SHIPPED | OUTPATIENT
Start: 2021-10-20 | End: 2021-10-24

## 2021-10-20 RX ORDER — LEVETIRACETAM 500 MG/1
500 TABLET ORAL 2 TIMES DAILY
Qty: 4 TABLET | Refills: 0 | Status: SHIPPED | OUTPATIENT
Start: 2021-10-20 | End: 2021-10-22

## 2021-10-20 RX ORDER — CHLORPROMAZINE HYDROCHLORIDE 25 MG/1
25 TABLET, FILM COATED ORAL 3 TIMES DAILY PRN
Qty: 20 TABLET | Refills: 0 | Status: SHIPPED | OUTPATIENT
Start: 2021-10-20 | End: 2021-11-05 | Stop reason: ALTCHOICE

## 2021-10-20 RX ORDER — HYDROCODONE BITARTRATE AND ACETAMINOPHEN 10; 325 MG/1; MG/1
1 TABLET ORAL EVERY 4 HOURS PRN
Qty: 12 TABLET | Refills: 0 | Status: SHIPPED | OUTPATIENT
Start: 2021-10-20 | End: 2021-12-06

## 2021-10-20 NOTE — PROGRESS NOTES
Seen briefly this pm out of charting that reports dry heaving and inabilty to keep things down last night. Patient ate some applesauce this am, drinking gatorade, chips and jello for lung. No emesis or nausea. No headaches. Feels tired.  Pt is fine to come

## 2021-10-20 NOTE — PROGRESS NOTES
[facility]  Neurosurgery Progress Note    Christiano Connor Patient Status:  Inpatient    1990 MRN [id number]   [facility] 7NE-A Attending Eagle Browne MD   Hosp Day # 7 PCP Cheng Triplett MD     Chief Complaint:  No chief

## 2021-10-20 NOTE — DISCHARGE SUMMARY
[facility]  Discharge Summary    Paco Primus Patient Status:  Inpatient    1990 MRN [id number]   [facility] 7NE-A Attending Devika Sharma MD   Hosp Day # 7 PCP Rosaura Person MD     Date of Admission: 10/13/2021    Yunior List    START taking these medications    chlorproMAZINE 25 MG Oral Tab  Take 1 tablet (25 mg total) by mouth 3 (three) times daily as needed (Hiccups).   Qty: 20 tablet Refills: 0    dexamethasone (DECADRON) 4 MG tablet  Take 1 tablet (4 mg total) by mouth regarding wound    Other Discharge Instructions:  F/u as scheduled.   Call or go to ED should you develop fever, shortness of breath, chest pain, or leg swelling    JH Mehta  10/20/2021  2:05 PM

## 2021-10-20 NOTE — PLAN OF CARE
NURSING DISCHARGE NOTE    Discharged Rehab facility via Wheelchair. Accompanied by Support staff  Belongings Taken by patient/family.     Received patient A/Ox4, RA, NSR on tele at 0700  Seizure precautions maintained  Neuro q4, bilateral eye nystagmus medications as ordered  - Monitor neurological status  Outcome: Adequate for Discharge  Goal: Remains free of injury related to seizure activity  Description: INTERVENTIONS:  - Maintain airway, patient safety  and administer oxygen as ordered  - Monitor pa

## 2021-10-20 NOTE — PROGRESS NOTES
[facility]     Hospitalist Progress Note     Jess Lindo Patient Status:  Inpatient    1990 MRN [id number]   [facility] 6NE-A Attending Karli Parkinson MD   Hosp Day # 7 PCP Tommie Urena MD     Chief Complaint: Medical COVID-19 Lab Results    COVID-19  Lab Results   Component Value Date    COVID19 Not Detected 10/19/2021    COVID19 Not Detected 10/11/2021    COVID19 Not Detected 09/24/2021       Pro-Calcitonin  No results for input(s): PCT in the last 168 hours.     C

## 2021-10-20 NOTE — PLAN OF CARE
Assumed care of patient at 2300. Patient complaining of hiccups, PRN Thorazine given. Patient also with RA pain, 1 Norco given as needed as well. Shortly after medications patient with approximately 50 ml's of yellowish/green emesis. Zofran given.   Patient

## 2021-10-20 NOTE — SLP NOTE
SPEECH DAILY NOTE - INPATIENT    ASSESSMENT & PLAN   ASSESSMENT  Pt seen for dysphagia tx to assess tolerance with recommended diet, ensure proper utilization of aspiration precautions and provide pt/family education.   RN reports that pt is having difficul Established Goals:  (1-2)    Session: 1/2    If you have any questions, please contact Maye Sever, SLP Theresia Bracken.  Chang Rodriguez M.S., CCC-SLP/L  Speech-Language Pathologist

## 2021-10-21 ENCOUNTER — TELEPHONE (OUTPATIENT)
Dept: RHEUMATOLOGY | Facility: CLINIC | Age: 31
End: 2021-10-21

## 2021-10-21 NOTE — TELEPHONE ENCOUNTER
Spoke to [facility] in Rehab she stated patient had craniotomy done on 10/13/21  They are asking when can he re-start methotrexate and Enbrel? Please advise.

## 2021-10-21 NOTE — TELEPHONE ENCOUNTER
Reviewed notes - looks like brain mass was an epidermoid cyst - benign   He is ok to restart methotrexate - would wait one week to restart enbrel

## 2021-10-21 NOTE — TELEPHONE ENCOUNTER
Migel from  [facility] calling to speak with nurse about a question about pts Methotrexate and when pt can restart the medication. Requesting call back. Please advise.

## 2021-10-21 NOTE — TELEPHONE ENCOUNTER
Spoke to [facility] and informed her of Dr. Mateus Harris note. She verbalized understanding.

## 2021-10-25 ENCOUNTER — TELEPHONE (OUTPATIENT)
Dept: SURGERY | Facility: CLINIC | Age: 31
End: 2021-10-25

## 2021-10-25 NOTE — TELEPHONE ENCOUNTER
Called Hemiricaa back to inform her staples can be removed. She states incision on assessment shows no signs of infection, healing well.   Patient originally had 2 week post op on 10-27 but will be staying at [facility] until 12-3 so it was rescheduled to 12-

## 2021-10-25 NOTE — TELEPHONE ENCOUNTER
Migel from [facility] calling to find out if it is ok to remove 2 staples remaining after surgery.   Pls call Walter to advise    [phone]

## 2021-10-27 ENCOUNTER — TELEPHONE (OUTPATIENT)
Dept: SURGERY | Facility: CLINIC | Age: 31
End: 2021-10-27

## 2021-10-27 ENCOUNTER — TELEPHONE (OUTPATIENT)
Dept: RHEUMATOLOGY | Facility: CLINIC | Age: 31
End: 2021-10-27

## 2021-10-27 NOTE — TELEPHONE ENCOUNTER
Received new call from Dr. Amalia Pereyra regarding message below. No PAs currently in the office to assist  Called and spoke to Dr. Rosetta Cranker who stated okay for pt to have steroid pack for his RA flare up.   Dr. Amalia Pereyra informed, nothing further needed for this encoun

## 2021-10-27 NOTE — TELEPHONE ENCOUNTER
Received call from Dr. Pranay Hardy stating pt had a 4th ventricle tumor removed with Dr. Julia Caraballo on 10/13 and pt has hx of rheumatoid arthritis and taking methotrexate but having a flare up and wanting to know if pt okay to take medrol dose pack.      Informed

## 2021-10-27 NOTE — TELEPHONE ENCOUNTER
Talked to dr. Ronak Isbell    pt.  Is having a flare of his RA - he took methotrexate on 10/23/2021 - the flares stared 1-2 days ago - affecting his ankle and foot and knees   - he is post ope from his crainal surgery - not malignancy -   D/w her ok to use medrol

## 2021-10-27 NOTE — TELEPHONE ENCOUNTER
A Dr. Emilee Puentes calling requesting call back from Dr regarding pt asap.      Cell#: [phone]

## 2021-11-01 NOTE — PROGRESS NOTES
Virtual Telephone Check-In    Paco Aburto verbally consents to a Virtual/Telephone Check-In visit on 11/01/21. Patient has been referred to the [facility] website at [email or web address] to review the yearly Consent to Treat document.     Patient unde

## 2021-11-05 ENCOUNTER — PATIENT MESSAGE (OUTPATIENT)
Dept: RHEUMATOLOGY | Facility: CLINIC | Age: 31
End: 2021-11-05

## 2021-11-05 ENCOUNTER — TELEPHONE (OUTPATIENT)
Dept: RHEUMATOLOGY | Facility: CLINIC | Age: 31
End: 2021-11-05

## 2021-11-05 ENCOUNTER — PATIENT MESSAGE (OUTPATIENT)
Dept: SURGERY | Facility: CLINIC | Age: 31
End: 2021-11-05

## 2021-11-05 RX ORDER — PREDNISONE 1 MG/1
5 TABLET ORAL DAILY
Qty: 30 TABLET | Refills: 1 | Status: SHIPPED | OUTPATIENT
Start: 2021-11-05 | End: 2021-12-09

## 2021-11-05 NOTE — TELEPHONE ENCOUNTER
Talked to pt. - he is back on prednisone since last week -   He had increased swellign and pain. The legs are swollen again. The enbrel is coming   D/w him ok to restart methtorexate and enbrel. The scalp is healed.    He's on prednisone now - medrol

## 2021-11-05 NOTE — TELEPHONE ENCOUNTER
Please see message below per My Chart      Post-op medication question   11/5/2021 4:22 PM Reply    To: RYANNE CASTILLO CLINICAL STAFF      From: Paco Aburto      Created: 11/5/2021 4:22 PM        *-*-*This message has not been handled. *-*-*    Hi Dr. [facility]!   I

## 2021-11-08 NOTE — TELEPHONE ENCOUNTER
From: Lorri Senters  To: Laurent Benoit MD  Sent: 11/5/2021 4:16 PM CDT  Subject: Post-op medication     Hi Dr. Belkys Wilburn,    I had surgery on 10/13 where you removed a mass.  At [facility], they started me back on some of my rheumatoid arthritis meds, bu

## 2021-11-08 NOTE — TELEPHONE ENCOUNTER
I discussed with Dr. Di Carter. Okay to resume all are he medication therapies. Per Dr. Di Carter, speech will take time to improve. Per wife it is already improved, but it is slow to progress.   Patient is otherwise doing well, he had his first at home therapy

## 2021-11-08 NOTE — TELEPHONE ENCOUNTER
S: Pt messaging via [facility] stating     \"Hi Dr. Darien Chang,    I had surgery on 10/13 where you removed a mass. At [facility], they started me back on some of my rheumatoid arthritis meds, but not all of them.   Is it okay if I start back specifically on the methotr

## 2021-12-03 PROBLEM — R52 PAIN: Status: ACTIVE | Noted: 2021-10-20

## 2021-12-03 PROBLEM — F41.9 ANXIETY: Status: ACTIVE | Noted: 2021-10-22

## 2021-12-03 PROBLEM — F43.23 ADJUSTMENT DISORDER WITH MIXED ANXIETY AND DEPRESSED MOOD: Status: ACTIVE | Noted: 2021-10-22

## 2021-12-03 PROBLEM — R42 VERTIGO: Status: ACTIVE | Noted: 2021-10-20

## 2021-12-09 ENCOUNTER — OFFICE VISIT (OUTPATIENT)
Dept: RHEUMATOLOGY | Facility: CLINIC | Age: 31
End: 2021-12-09
Payer: COMMERCIAL

## 2021-12-09 ENCOUNTER — OFFICE VISIT (OUTPATIENT)
Dept: SURGERY | Facility: CLINIC | Age: 31
End: 2021-12-09
Payer: COMMERCIAL

## 2021-12-09 VITALS
SYSTOLIC BLOOD PRESSURE: 100 MMHG | HEART RATE: 100 BPM | BODY MASS INDEX: 25.34 KG/M2 | DIASTOLIC BLOOD PRESSURE: 70 MMHG | WEIGHT: 177 LBS | HEIGHT: 70 IN

## 2021-12-09 VITALS
WEIGHT: 177 LBS | BODY MASS INDEX: 25.34 KG/M2 | SYSTOLIC BLOOD PRESSURE: 113 MMHG | HEART RATE: 92 BPM | DIASTOLIC BLOOD PRESSURE: 77 MMHG | HEIGHT: 70 IN | RESPIRATION RATE: 16 BRPM

## 2021-12-09 DIAGNOSIS — M06.9 RHEUMATOID ARTHRITIS, INVOLVING UNSPECIFIED SITE, UNSPECIFIED WHETHER RHEUMATOID FACTOR PRESENT (HCC): Primary | ICD-10-CM

## 2021-12-09 DIAGNOSIS — Z51.81 THERAPEUTIC DRUG MONITORING: ICD-10-CM

## 2021-12-09 DIAGNOSIS — G93.0 EPIDERMOID CYST OF BRAIN: Primary | ICD-10-CM

## 2021-12-09 PROCEDURE — 3008F BODY MASS INDEX DOCD: CPT | Performed by: NEUROLOGICAL SURGERY

## 2021-12-09 PROCEDURE — 3074F SYST BP LT 130 MM HG: CPT | Performed by: INTERNAL MEDICINE

## 2021-12-09 PROCEDURE — 3074F SYST BP LT 130 MM HG: CPT | Performed by: NEUROLOGICAL SURGERY

## 2021-12-09 PROCEDURE — 3078F DIAST BP <80 MM HG: CPT | Performed by: INTERNAL MEDICINE

## 2021-12-09 PROCEDURE — 99214 OFFICE O/P EST MOD 30 MIN: CPT | Performed by: INTERNAL MEDICINE

## 2021-12-09 PROCEDURE — 3078F DIAST BP <80 MM HG: CPT | Performed by: NEUROLOGICAL SURGERY

## 2021-12-09 PROCEDURE — 3008F BODY MASS INDEX DOCD: CPT | Performed by: INTERNAL MEDICINE

## 2021-12-09 PROCEDURE — 99024 POSTOP FOLLOW-UP VISIT: CPT | Performed by: NEUROLOGICAL SURGERY

## 2021-12-09 RX ORDER — METHYLPREDNISOLONE 4 MG/1
TABLET ORAL
Qty: 1 EACH | Refills: 0 | Status: SHIPPED | OUTPATIENT
Start: 2021-12-09 | End: 2022-02-01

## 2021-12-09 RX ORDER — ETANERCEPT 50 MG/ML
SOLUTION SUBCUTANEOUS
COMMUNITY
Start: 2021-12-01 | End: 2022-01-10

## 2021-12-09 RX ORDER — PREDNISONE 1 MG/1
5 TABLET ORAL DAILY
Qty: 30 TABLET | Refills: 3 | Status: SHIPPED | OUTPATIENT
Start: 2021-12-09

## 2021-12-09 RX ORDER — MELATONIN
3 NIGHTLY
COMMUNITY
Start: 2021-11-02 | End: 2022-02-01

## 2021-12-09 RX ORDER — PSEUDOEPHEDRINE HCL 30 MG
100 TABLET ORAL 2 TIMES DAILY
COMMUNITY
Start: 2021-11-02

## 2021-12-09 NOTE — PROGRESS NOTES
Benito Chawla is a 26year old male who presents for Patient presents with:  Rheumatoid Arthritis  Medication Follow-Up  Knee Pain: B/L  Foot Pain: B/L  Hand Pain: B/L  .   HPI:     I had the pleasure of seeing Benito Chawla on 2/15/2020 for eval elbows. occl his right ankle bothers him. No hip pain. He has pain in his upper back. He's not been on any other medication. His eyes have gotten red and hurt. He has never seen an eye doctor. He's going to see an eye doctor. No hx of psoriasis. neilt have had it. He is feeling worse and worse by the day. He is having about 5/10 pain. It' snot good. He has pain on his left shoulder , hands , feet , ankles and right knee. He has left elbow pain. He can't straighten that.      5/15/2021  He kg)  12/09/21 : 177 lb (80.3 kg)    Body mass index is 25.4 kg/m². Current Outpatient Medications   Medication Sig Dispense Refill   • docusate sodium 100 MG Oral Cap Take 100 mg by mouth 2 (two) times daily.      • ENBREL SURECLICK 50 MG/ML Subcutaneo Sister    • No Known Problems Brother    • No Known Problems Other    no arthritis, 1 sister,    Social History:  Social History    Tobacco Use      Smoking status: Never Smoker      Smokeless tobacco: Never Used    Vaping Use      Vaping Use: Never used fusing   B/l elbows tender -   Left elbwo can't fully extend, tender +1 swelling. Left shoulder can't abduct - newly decreaed abduction and tender can't abduct more than 45 degrees   Right knee tender and +2 swleling   No neck tenderness at this time. RATE BY MODIFIED$WESTERGREN      < OR = 15 mm/h 14   EDWARD SCREEN, IFA      NEGATIVE NEGATIVE   HEPATITIS B CORE AB TOTAL      NON-REACTIVE NON-REACTIVE   HBSAg Screen      NON-REACTIVE NON-REACTIVE   RHEUMATOID FACTOR      <14 IU/mL 27 (H)   CYCLIC CITRULLI PROTEIN, TOTAL      6.4 - 8.2 g/dL 7.0   Albumin      3.4 - 5.0 g/dL 2.1 (L)   Globulin      2.8 - 4.4 g/dL 4.9 (H)   A/G Ratio      1.0 - 2.0 0.4 (L)   HEMOGLOBIN A1c      <5.7 % 5.9 (H)   ESTIMATED AVERAGE GLUCOSE      68 - 126 mg/dL 123       11/28/20 and remicade in the past     rtc in 2-3 months. 2. Left knee arthroplasty in 2018 - hx of AVN of left knee     3. Got covid vaccine in June 2021 -  4. Eye exam - for left eye ptosis - seeing neuroopthalmologist 3/1/2022     Summary:  1.  Cont.  enbrel 5

## 2021-12-09 NOTE — PROGRESS NOTES
Pt is here regardin-8 week post op   SUBOCCIPITAL CRANIECTOMY FOR TUMOR WITH NEURO NAVIGATION, MICROSCOPE DISSECTION AND NEURO MONITORING N/A General   NAVIGATION SYSTEM NEURO N/A General   INTRAOPERATIVE NEURO MONITORING         Pt states he doing a l

## 2021-12-09 NOTE — PROGRESS NOTES
[facility]    Zac Vazquez  12/9/2021    Diagnosis: Fourth ventricular epidermoid tumor  10/13/2021 suboccipital craniotomy for epidermoid tumor    Chief complaint: Improving residual diplopia    Interval History: Overall

## 2021-12-09 NOTE — PATIENT INSTRUCTIONS
1. Cont.  enbrel 50mg a week   2. Cont. Methotrexate 8 tablets a week   3. Cont. Folic acid 1mg a day   4. Cont. Prednisone 5mg a day - will plan to taper in 2-3 months.    5. Medrol santiago for any flare up

## 2022-01-04 ENCOUNTER — HOSPITAL ENCOUNTER (OUTPATIENT)
Dept: MRI IMAGING | Age: 32
Discharge: HOME | End: 2022-01-04
Attending: NEUROLOGICAL SURGERY
Payer: COMMERCIAL

## 2022-01-04 DIAGNOSIS — G93.0 EPIDERMOID CYST OF BRAIN: ICD-10-CM

## 2022-01-04 LAB — CREAT BLD-MCNC: 0.6 MG/DL

## 2022-01-04 PROCEDURE — A9575 INJ GADOTERATE MEGLUMI 0.1ML: HCPCS | Performed by: NEUROLOGICAL SURGERY

## 2022-01-04 PROCEDURE — 82565 ASSAY OF CREATININE: CPT

## 2022-01-04 PROCEDURE — 70553 MRI BRAIN STEM W/O & W/DYE: CPT | Performed by: NEUROLOGICAL SURGERY

## 2022-01-10 ENCOUNTER — TELEPHONE (OUTPATIENT)
Dept: RHEUMATOLOGY | Facility: CLINIC | Age: 32
End: 2022-01-10

## 2022-01-10 RX ORDER — ETANERCEPT 50 MG/ML
50 SOLUTION SUBCUTANEOUS WEEKLY
Qty: 12 ML | Refills: 2 | Status: SHIPPED | OUTPATIENT
Start: 2022-01-10

## 2022-01-10 NOTE — TELEPHONE ENCOUNTER
CaseId: [id number]  Status:Approved  12/11/2021 - 07/09/2022    LOV: 12/9/21  Future Appointments   Date Time Provider Rodrigo Cooper   3/10/2022 10:00 AM Dixie Fire, MD [facility] [place]    LABS:  Summary:  1. Cont.  enbrel 50mg a week   2.

## 2022-01-10 NOTE — TELEPHONE ENCOUNTER
PA started on [employer] for Enbrel.      Fax received from [employer] requesting new PA  Enbrel [employer]ST [employer]

## 2022-01-11 ENCOUNTER — OFFICE VISIT (OUTPATIENT)
Dept: NEUROLOGY | Facility: CLINIC | Age: 32
End: 2022-01-11
Payer: COMMERCIAL

## 2022-01-11 ENCOUNTER — NURSE ONLY (OUTPATIENT)
Dept: HEMATOLOGY/ONCOLOGY | Facility: HOSPITAL | Age: 32
End: 2022-01-11
Attending: NEUROLOGICAL SURGERY
Payer: COMMERCIAL

## 2022-01-11 VITALS
BODY MASS INDEX: 24.48 KG/M2 | DIASTOLIC BLOOD PRESSURE: 72 MMHG | RESPIRATION RATE: 16 BRPM | WEIGHT: 171 LBS | HEIGHT: 70 IN | OXYGEN SATURATION: 99 % | TEMPERATURE: 99 F | HEART RATE: 92 BPM | SYSTOLIC BLOOD PRESSURE: 107 MMHG

## 2022-01-11 DIAGNOSIS — H49.22 6TH NERVE PALSY, LEFT: ICD-10-CM

## 2022-01-11 DIAGNOSIS — G93.0 EPIDERMOID CYST OF BRAIN: Primary | ICD-10-CM

## 2022-01-11 DIAGNOSIS — Z98.890 HX OF CRANIOTOMY: ICD-10-CM

## 2022-01-11 PROCEDURE — 99211 OFF/OP EST MAY X REQ PHY/QHP: CPT

## 2022-01-11 PROCEDURE — 99024 POSTOP FOLLOW-UP VISIT: CPT | Performed by: PHYSICIAN ASSISTANT

## 2022-01-11 NOTE — PROGRESS NOTES
Patient: Jolynn Chan  Medical Record Number: [id number]  YOB: 1990  PCP: Kenia Shepard MD    Reason for visit: hx of crani, epidermoid tumor    HISTORY OF CHIEF COMPLAINT:    Jolynn Chan is a very pleasant 33year old male wh Concern: No        Weight Concern: No       Cat Hair Extract        OTHER (SEE COMMENTS)    Comment:Watery eyes, sneezing  Dust                    OTHER (SEE COMMENTS)    Comment:Watery eyes, sneezing   Current Medications:  Current Outpatient Medications Speech fluent, comprehension intact, answering questions appropriately. 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 Mild + Romberg. LISBETH intact. F2N intact. SPINE:  Gait/Coordination: Intact, non-antalgic gait.  Using cane f acute or subacute ischemia.    CEREBELLUM: No edema, hemorrhage, mass, acute infarction, or significant atrophy.     BRAINSTEM: The craniocervical junction is uncompromised.     CALVARIUM: Foci of susceptibility artifact overlying the right posterior pariet Ordered today:    - MRI brain w + wo:     - 1 year from surgical intervention  3. Other:   - Okay to proceed with GI work up/procedures from neurosurgical stand point.    4. Follow up in 1 year with Dr. Annemarie Mccoy after conference or call or follow up sooner or

## 2022-02-14 ENCOUNTER — LAB ENCOUNTER (OUTPATIENT)
Dept: LAB | Age: 32
End: 2022-02-14
Attending: INTERNAL MEDICINE
Payer: COMMERCIAL

## 2022-02-14 DIAGNOSIS — Z01.818 PRE-OP TESTING: ICD-10-CM

## 2022-02-14 LAB — SARS-COV-2 RNA RESP QL NAA+PROBE: NOT DETECTED

## 2022-02-17 ENCOUNTER — ANESTHESIA (OUTPATIENT)
Dept: ENDOSCOPY | Facility: HOSPITAL | Age: 32
End: 2022-02-17
Payer: COMMERCIAL

## 2022-02-17 ENCOUNTER — HOSPITAL ENCOUNTER (OUTPATIENT)
Facility: HOSPITAL | Age: 32
Setting detail: HOSPITAL OUTPATIENT SURGERY
Discharge: HOME | End: 2022-02-17
Attending: INTERNAL MEDICINE | Admitting: INTERNAL MEDICINE
Payer: COMMERCIAL

## 2022-02-17 ENCOUNTER — ANESTHESIA EVENT (OUTPATIENT)
Dept: ENDOSCOPY | Facility: HOSPITAL | Age: 32
End: 2022-02-17
Payer: COMMERCIAL

## 2022-02-17 VITALS
OXYGEN SATURATION: 97 % | DIASTOLIC BLOOD PRESSURE: 74 MMHG | WEIGHT: 147 LBS | SYSTOLIC BLOOD PRESSURE: 103 MMHG | BODY MASS INDEX: 21.05 KG/M2 | HEIGHT: 70 IN | HEART RATE: 89 BPM | RESPIRATION RATE: 14 BRPM

## 2022-02-17 DIAGNOSIS — Z01.818 PRE-OP TESTING: Primary | ICD-10-CM

## 2022-02-17 DIAGNOSIS — R19.7 DIARRHEA, UNSPECIFIED TYPE: ICD-10-CM

## 2022-02-17 DIAGNOSIS — R63.4 WEIGHT LOSS: ICD-10-CM

## 2022-02-17 DIAGNOSIS — R68.81 EARLY SATIETY: ICD-10-CM

## 2022-02-17 DIAGNOSIS — D50.9 IRON DEFICIENCY ANEMIA, UNSPECIFIED IRON DEFICIENCY ANEMIA TYPE: ICD-10-CM

## 2022-02-17 PROCEDURE — 88312 SPECIAL STAINS GROUP 1: CPT | Performed by: INTERNAL MEDICINE

## 2022-02-17 PROCEDURE — 88305 TISSUE EXAM BY PATHOLOGIST: CPT | Performed by: INTERNAL MEDICINE

## 2022-02-17 PROCEDURE — 0DJD8ZZ INSPECTION OF LOWER INTESTINAL TRACT, VIA NATURAL OR ARTIFICIAL OPENING ENDOSCOPIC: ICD-10-PCS | Performed by: INTERNAL MEDICINE

## 2022-02-17 PROCEDURE — 36415 COLL VENOUS BLD VENIPUNCTURE: CPT | Performed by: INTERNAL MEDICINE

## 2022-02-17 PROCEDURE — 0DB68ZX EXCISION OF STOMACH, VIA NATURAL OR ARTIFICIAL OPENING ENDOSCOPIC, DIAGNOSTIC: ICD-10-PCS | Performed by: INTERNAL MEDICINE

## 2022-02-17 RX ORDER — ONDANSETRON HYDROCHLORIDE 8 MG/1
8 TABLET, FILM COATED ORAL EVERY 12 HOURS PRN
Qty: 20 TABLET | Refills: 2 | Status: SHIPPED | OUTPATIENT
Start: 2022-02-17 | End: 2022-02-27

## 2022-02-17 RX ORDER — SODIUM CHLORIDE, SODIUM LACTATE, POTASSIUM CHLORIDE, CALCIUM CHLORIDE 600; 310; 30; 20 MG/100ML; MG/100ML; MG/100ML; MG/100ML
INJECTION, SOLUTION INTRAVENOUS CONTINUOUS
Status: DISCONTINUED | OUTPATIENT
Start: 2022-02-17 | End: 2022-02-17

## 2022-02-17 RX ORDER — OMEPRAZOLE 40 MG/1
40 CAPSULE, DELAYED RELEASE ORAL
Qty: 120 CAPSULE | Refills: 0 | Status: SHIPPED | OUTPATIENT
Start: 2022-02-17 | End: 2022-03-14

## 2022-02-17 RX ORDER — ONDANSETRON 2 MG/ML
4 INJECTION INTRAMUSCULAR; INTRAVENOUS ONCE
Status: COMPLETED | OUTPATIENT
Start: 2022-02-17 | End: 2022-02-17

## 2022-02-17 RX ORDER — LIDOCAINE HYDROCHLORIDE 10 MG/ML
INJECTION, SOLUTION EPIDURAL; INFILTRATION; INTRACAUDAL; PERINEURAL AS NEEDED
Status: DISCONTINUED | OUTPATIENT
Start: 2022-02-17 | End: 2022-02-17 | Stop reason: SURG

## 2022-02-17 RX ORDER — NALOXONE HYDROCHLORIDE 0.4 MG/ML
80 INJECTION, SOLUTION INTRAMUSCULAR; INTRAVENOUS; SUBCUTANEOUS AS NEEDED
Status: DISCONTINUED | OUTPATIENT
Start: 2022-02-17 | End: 2022-02-17

## 2022-02-17 RX ADMIN — LIDOCAINE HYDROCHLORIDE 100 MG: 10 INJECTION, SOLUTION EPIDURAL; INFILTRATION; INTRACAUDAL; PERINEURAL at 15:24:00

## 2022-02-17 RX ADMIN — SODIUM CHLORIDE, SODIUM LACTATE, POTASSIUM CHLORIDE, CALCIUM CHLORIDE: 600; 310; 30; 20 INJECTION, SOLUTION INTRAVENOUS at 15:24:00

## 2022-02-17 NOTE — DISCHARGE SUMMARY
ENDOSCOPY DISCHARGE INSTRUCTIONS    Procedure Performed:   Gastroscopy and Colonoscopy    Endoscopist: No name on file. FINDINGS:   Gastritis (inflammation of the stomach lining)     The prep of the colon was poor with solid and semisolid stool seen throughout. MEDICATIONS:  You may resume all other medications today    DIET:  Resume Normal Diet    BIOPSIES:  Biopsies were taken (you will be notified of results in 7-10 days)    X-RAYS/LABS:   No X-rays/Labs were ordered today    ADDITIONAL RECOMMENDATIONS:    - Follow up pathology results  - Start on Prilosec (Omeprazole) 40 mg twice a day (30 minutes prior to breakfast and dinner)  - Zofran as needed for nausea  - Follow up in clinic in 2-3 months    Activity for remainder of today:    REST TODAY  DO NOT drive or operate heavy machinery  DO NOT drink any alcoholic beverages  DO NOT sign any legal documents or make any important decisions    After your procedure(s): It is not unusual to feel bloated or gassy . Passing gas and belching is encouraged. Lying on your left side with your knees flexed may relieve the discomfort. A hot pack to the abdomen may also help. After your gastroscopy (upper endoscopy): You may experience a slight sore throat which will subside. Throat lozenges or salt water gargle can be used.     FOLLOW-UP:  Contact the office at [phone] for follow-up appointment is needed or if you develop any of the following:    Severe abdominal pain/discomfort     Excessive bleeding                     Black tarry stool    Difficulty breathing/swallowing      Persistent nausea/vomiting  Fever above 100 degrees or chills

## 2022-02-17 NOTE — ANESTHESIA POSTPROCEDURE EVALUATION
Patient: Olman Yancey    Procedure Summary     Date: 02/17/22 Room / Location: [address] [place] [address] ENDOSCOPY 05 / [address] [place] [address] ENDOSCOPY    Anesthesia Start: 0856 Anesthesia Stop:     Procedures:       COLONOSCOPY/ESOPHAGOGASTRODUODENOSCOPY (EGD) (N/A )      ESOPHAGOGASTRODUODENOSCOPY (EGD) (N/A ) Diagnosis:       Weight loss      Diarrhea, unspecified type      Early satiety      Iron deficiency anemia, unspecified iron deficiency anemia type      (hemorrhoids, gastritis)    Surgeons: Tami Rodriguez MD Anesthesiologist: Sarah Meyers CRNA    Anesthesia Type: MAC ASA Status: 2          Anesthesia Type: MAC    PACU Vitals    /74 (02/17/22 1552)    Temp      Pulse 95 (02/17/22 1552)   Resp 20 (02/17/22 1552)    SpO2 97 % (02/17/22 1552)        [facility] AN Post Evaluation:   Patient Evaluated in PACU  Patient Participation: complete - patient participated  Level of Consciousness: sleepy but conscious  Pain Score: 0  Pain Management: adequate  Airway Patency:patent  Dental exam unchanged from preop  Yes    Cardiovascular Status: acceptable  Respiratory Status: acceptable  Postoperative Hydration acceptable      Crystal Mckeon CRNA  2/17/2022 3:53 PM

## 2022-02-17 NOTE — OR NURSING
Pt c/o nausea. RN spoke with the CRNA, received order for 4 mg Zofran IVP. Pt appears pale, /77, HR 89, 97%RA. Denies any pain.

## 2022-03-01 NOTE — PROGRESS NOTES
3/1/2022  Stiven Conteh [facility] [address] [facility] [address]    Dear Monica Mason,       Here are the biopsy/pathology findings from your recent EGD (Upper  Endoscopy):    - Mild gastritis - an inflammation of the lining of the stomach which can be due to medications, acid irritation, dietary factors as well as bile refluxing into the stomach from the small bowel. Follow-up information:    - Continue Prilosec (Omeprazole) 40 mg twice a day (30 minutes prior to breakfast and dinner)    - Zofran as needed for nausea    - Follow up in clinic in 2-3 months to assess response        If you need any further assistance, please feel free to call [phone]. Thank you for letting us care for you .     Sincerely ,     MD Amado Gallardo [facility] Gastroenterology  [phone]

## 2022-03-03 NOTE — ANESTHESIA PROCEDURE NOTES
Peripheral IV  Inserted by: Eddy García MD    Placement  Needle size: 18 G  Laterality: right  Location: forearm  Site prep: alcohol  Technique: anatomical landmarks  Attempts: 1 Adult

## 2022-03-08 NOTE — PROGRESS NOTES
Multiple views of the internal mammary artery to the left anterior descending obtained using power injection. Seen by patient Holly Sexton on 3/1/2022 12:34 PM

## 2022-03-10 ENCOUNTER — OFFICE VISIT (OUTPATIENT)
Dept: RHEUMATOLOGY | Facility: CLINIC | Age: 32
End: 2022-03-10
Payer: COMMERCIAL

## 2022-03-10 ENCOUNTER — TELEPHONE (OUTPATIENT)
Dept: RHEUMATOLOGY | Facility: CLINIC | Age: 32
End: 2022-03-10

## 2022-03-10 VITALS
RESPIRATION RATE: 16 BRPM | WEIGHT: 137 LBS | HEIGHT: 70 IN | HEART RATE: 118 BPM | SYSTOLIC BLOOD PRESSURE: 105 MMHG | BODY MASS INDEX: 19.61 KG/M2 | DIASTOLIC BLOOD PRESSURE: 68 MMHG

## 2022-03-10 DIAGNOSIS — G89.29 CHRONIC PAIN OF RIGHT KNEE: ICD-10-CM

## 2022-03-10 DIAGNOSIS — M06.9 RHEUMATOID ARTHRITIS, INVOLVING UNSPECIFIED SITE, UNSPECIFIED WHETHER RHEUMATOID FACTOR PRESENT (HCC): Primary | ICD-10-CM

## 2022-03-10 DIAGNOSIS — Z51.81 THERAPEUTIC DRUG MONITORING: ICD-10-CM

## 2022-03-10 DIAGNOSIS — M25.561 CHRONIC PAIN OF RIGHT KNEE: ICD-10-CM

## 2022-03-10 PROCEDURE — 3008F BODY MASS INDEX DOCD: CPT | Performed by: INTERNAL MEDICINE

## 2022-03-10 PROCEDURE — 20610 DRAIN/INJ JOINT/BURSA W/O US: CPT | Performed by: INTERNAL MEDICINE

## 2022-03-10 PROCEDURE — 3078F DIAST BP <80 MM HG: CPT | Performed by: INTERNAL MEDICINE

## 2022-03-10 PROCEDURE — 99214 OFFICE O/P EST MOD 30 MIN: CPT | Performed by: INTERNAL MEDICINE

## 2022-03-10 PROCEDURE — 3074F SYST BP LT 130 MM HG: CPT | Performed by: INTERNAL MEDICINE

## 2022-03-10 RX ORDER — TRIAMCINOLONE ACETONIDE 40 MG/ML
40 INJECTION, SUSPENSION INTRA-ARTICULAR; INTRAMUSCULAR ONCE
Status: COMPLETED | OUTPATIENT
Start: 2022-03-10 | End: 2022-03-10

## 2022-03-10 RX ORDER — PREDNISONE 10 MG/1
20 TABLET ORAL DAILY
Qty: 180 TABLET | Refills: 0 | Status: SHIPPED | OUTPATIENT
Start: 2022-03-10

## 2022-03-10 RX ADMIN — TRIAMCINOLONE ACETONIDE 40 MG: 40 INJECTION, SUSPENSION INTRA-ARTICULAR; INTRAMUSCULAR at 10:30:00

## 2022-03-10 NOTE — PROCEDURES
With paitent's consent, I injected pt's right knee with 1ml lidocaine 1 % and 1 ml kenalog 40. It was done under sterile technique using iodine and alcohol swabs and ethyl chloride was used as an anaesthetic spray. Pt.  tolerated it well.   Aspirated 75cc

## 2022-03-11 PROCEDURE — 80061 LIPID PANEL: CPT | Performed by: NURSE PRACTITIONER

## 2022-03-11 PROCEDURE — 84481 FREE ASSAY (FT-3): CPT | Performed by: NURSE PRACTITIONER

## 2022-03-11 PROCEDURE — 83690 ASSAY OF LIPASE: CPT | Performed by: NURSE PRACTITIONER

## 2022-03-11 PROCEDURE — 82306 VITAMIN D 25 HYDROXY: CPT | Performed by: NURSE PRACTITIONER

## 2022-03-11 PROCEDURE — 84443 ASSAY THYROID STIM HORMONE: CPT | Performed by: NURSE PRACTITIONER

## 2022-03-11 PROCEDURE — 84439 ASSAY OF FREE THYROXINE: CPT | Performed by: NURSE PRACTITIONER

## 2022-03-11 PROCEDURE — 80053 COMPREHEN METABOLIC PANEL: CPT | Performed by: NURSE PRACTITIONER

## 2022-03-11 PROCEDURE — 85025 COMPLETE CBC W/AUTO DIFF WBC: CPT | Performed by: NURSE PRACTITIONER

## 2022-03-11 PROCEDURE — 82150 ASSAY OF AMYLASE: CPT | Performed by: NURSE PRACTITIONER

## 2022-03-16 RX ORDER — ABATACEPT 125 MG/ML
125 INJECTION, SOLUTION SUBCUTANEOUS
Qty: 4 EACH | Refills: 5 | Status: SHIPPED | OUTPATIENT
Start: 2022-03-16

## 2022-03-16 NOTE — TELEPHONE ENCOUNTER
PA approved for [employer]. Script pended to [facility] per pt. 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 Aware to go online and apply for a co-pay card. YIGZYS:[id number];VFWNYC:[id number]; Review Type:Prior Auth; Coverage Start Date:02/08/2022; Coverage End Date:09/10/2022;

## 2022-04-06 ENCOUNTER — TELEPHONE (OUTPATIENT)
Dept: NEUROLOGY | Facility: CLINIC | Age: 32
End: 2022-04-06

## 2022-04-11 ENCOUNTER — APPOINTMENT (OUTPATIENT)
Dept: CT IMAGING | Facility: HOSPITAL | Age: 32
End: 2022-04-11
Attending: NURSE PRACTITIONER
Payer: COMMERCIAL

## 2022-04-11 ENCOUNTER — HOSPITAL ENCOUNTER (INPATIENT)
Facility: HOSPITAL | Age: 32
LOS: 1 days | Discharge: HOME | End: 2022-04-14
Attending: INTERNAL MEDICINE | Admitting: INTERNAL MEDICINE
Payer: COMMERCIAL

## 2022-04-11 DIAGNOSIS — R11.10 INTRACTABLE VOMITING: Primary | ICD-10-CM

## 2022-04-11 LAB
ALBUMIN SERPL-MCNC: 3.1 G/DL (ref 3.4–5)
ALP LIVER SERPL-CCNC: 150 U/L
ALT SERPL-CCNC: 7 U/L
AMPHET UR QL SCN: NEGATIVE
ANION GAP SERPL CALC-SCNC: 12 MMOL/L (ref 0–18)
AST SERPL-CCNC: 9 U/L (ref 15–37)
BASOPHILS # BLD AUTO: 0.02 X10(3) UL (ref 0–0.2)
BASOPHILS NFR BLD AUTO: 0.2 %
BILIRUB DIRECT SERPL-MCNC: 0.1 MG/DL (ref 0–0.2)
BILIRUB SERPL-MCNC: 0.4 MG/DL (ref 0.1–2)
BILIRUB UR QL CFM: NEGATIVE
BUN BLD-MCNC: 6 MG/DL (ref 7–18)
BUN/CREAT SERPL: 8.3 (ref 10–20)
CALCIUM BLD-MCNC: 9.5 MG/DL (ref 8.5–10.1)
CHLORIDE SERPL-SCNC: 97 MMOL/L (ref 98–112)
CO2 SERPL-SCNC: 25 MMOL/L (ref 21–32)
COCAINE UR QL: NEGATIVE
COLOR UR: YELLOW
CREAT BLD-MCNC: 0.72 MG/DL
CREAT UR-SCNC: 510 MG/DL
DEPRECATED RDW RBC AUTO: 44.9 FL (ref 35.1–46.3)
EOSINOPHIL # BLD AUTO: 0 X10(3) UL (ref 0–0.7)
EOSINOPHIL NFR BLD AUTO: 0 %
ERYTHROCYTE [DISTWIDTH] IN BLOOD BY AUTOMATED COUNT: 13.3 % (ref 11–15)
GLUCOSE BLD-MCNC: 97 MG/DL (ref 70–99)
GLUCOSE UR-MCNC: NEGATIVE MG/DL
HCT VFR BLD AUTO: 40.2 %
HGB BLD-MCNC: 12.4 G/DL
HGB UR QL STRIP.AUTO: NEGATIVE
HYALINE CASTS #/AREA URNS AUTO: PRESENT /LPF
IMM GRANULOCYTES # BLD AUTO: 0.03 X10(3) UL (ref 0–1)
IMM GRANULOCYTES NFR BLD: 0.4 %
KETONES UR-MCNC: 80 MG/DL
LEUKOCYTE ESTERASE UR QL STRIP.AUTO: NEGATIVE
LIPASE SERPL-CCNC: 95 U/L (ref 73–393)
LYMPHOCYTES # BLD AUTO: 0.59 X10(3) UL (ref 1–4)
LYMPHOCYTES NFR BLD AUTO: 7.3 %
MCH RBC QN AUTO: 28.1 PG (ref 26–34)
MCHC RBC AUTO-ENTMCNC: 30.8 G/DL (ref 31–37)
MCV RBC AUTO: 91 FL
MDMA UR QL SCN: NEGATIVE
METHADONE UR QL SCN: NEGATIVE
MONOCYTES # BLD AUTO: 0.17 X10(3) UL (ref 0.1–1)
MONOCYTES NFR BLD AUTO: 2.1 %
NEUTROPHILS # BLD AUTO: 7.29 X10 (3) UL (ref 1.5–7.7)
NEUTROPHILS # BLD AUTO: 7.29 X10(3) UL (ref 1.5–7.7)
NEUTROPHILS NFR BLD AUTO: 90 %
NITRITE UR QL STRIP.AUTO: NEGATIVE
OSMOLALITY SERPL CALC.SUM OF ELEC: 276 MOSM/KG (ref 275–295)
OXYCODONE UR QL SCN: NEGATIVE
PCP UR QL SCN: NEGATIVE
PH UR: 6 [PH] (ref 5–8)
PLATELET # BLD AUTO: 451 10(3)UL (ref 150–450)
POTASSIUM SERPL-SCNC: 3.8 MMOL/L (ref 3.5–5.1)
PROT SERPL-MCNC: 8.1 G/DL (ref 6.4–8.2)
PROT UR-MCNC: 100 MG/DL
RBC # BLD AUTO: 4.42 X10(6)UL
SARS-COV-2 RNA RESP QL NAA+PROBE: NOT DETECTED
SODIUM SERPL-SCNC: 134 MMOL/L (ref 136–145)
SP GR UR STRIP: 1.03 (ref 1–1.03)
UROBILINOGEN UR STRIP-ACNC: 2
VIT C UR-MCNC: NEGATIVE MG/DL
WBC # BLD AUTO: 8.1 X10(3) UL (ref 4–11)

## 2022-04-11 PROCEDURE — 80076 HEPATIC FUNCTION PANEL: CPT | Performed by: NURSE PRACTITIONER

## 2022-04-11 PROCEDURE — 83690 ASSAY OF LIPASE: CPT | Performed by: NURSE PRACTITIONER

## 2022-04-11 PROCEDURE — 80048 BASIC METABOLIC PNL TOTAL CA: CPT

## 2022-04-11 PROCEDURE — 96374 THER/PROPH/DIAG INJ IV PUSH: CPT

## 2022-04-11 PROCEDURE — 80307 DRUG TEST PRSMV CHEM ANLYZR: CPT | Performed by: NURSE PRACTITIONER

## 2022-04-11 PROCEDURE — 85025 COMPLETE CBC W/AUTO DIFF WBC: CPT

## 2022-04-11 PROCEDURE — 99285 EMERGENCY DEPT VISIT HI MDM: CPT

## 2022-04-11 PROCEDURE — 74177 CT ABD & PELVIS W/CONTRAST: CPT | Performed by: NURSE PRACTITIONER

## 2022-04-11 PROCEDURE — 81001 URINALYSIS AUTO W/SCOPE: CPT | Performed by: NURSE PRACTITIONER

## 2022-04-11 PROCEDURE — 96361 HYDRATE IV INFUSION ADD-ON: CPT

## 2022-04-11 RX ORDER — HYDROCODONE BITARTRATE AND ACETAMINOPHEN 10; 325 MG/1; MG/1
1 TABLET ORAL EVERY 4 HOURS PRN
Status: DISCONTINUED | OUTPATIENT
Start: 2022-04-11 | End: 2022-04-14

## 2022-04-11 RX ORDER — BUTALBITAL, ACETAMINOPHEN AND CAFFEINE 50; 325; 40 MG/1; MG/1; MG/1
1 TABLET ORAL EVERY 4 HOURS PRN
Status: DISCONTINUED | OUTPATIENT
Start: 2022-04-11 | End: 2022-04-14

## 2022-04-11 RX ORDER — ONDANSETRON 2 MG/ML
4 INJECTION INTRAMUSCULAR; INTRAVENOUS ONCE
Status: COMPLETED | OUTPATIENT
Start: 2022-04-11 | End: 2022-04-11

## 2022-04-11 RX ORDER — PANTOPRAZOLE SODIUM 40 MG/1
40 TABLET, DELAYED RELEASE ORAL
Status: DISCONTINUED | OUTPATIENT
Start: 2022-04-12 | End: 2022-04-14

## 2022-04-11 RX ORDER — PROCHLORPERAZINE MALEATE 10 MG
10 TABLET ORAL EVERY 8 HOURS PRN
Status: DISCONTINUED | OUTPATIENT
Start: 2022-04-11 | End: 2022-04-13

## 2022-04-11 RX ORDER — SODIUM CHLORIDE 9 MG/ML
INJECTION, SOLUTION INTRAVENOUS CONTINUOUS
Status: DISCONTINUED | OUTPATIENT
Start: 2022-04-11 | End: 2022-04-14

## 2022-04-11 RX ORDER — ALPRAZOLAM 0.25 MG/1
0.25 TABLET ORAL 2 TIMES DAILY PRN
Status: DISCONTINUED | OUTPATIENT
Start: 2022-04-11 | End: 2022-04-14

## 2022-04-11 RX ORDER — ONDANSETRON 2 MG/ML
4 INJECTION INTRAMUSCULAR; INTRAVENOUS EVERY 6 HOURS PRN
Status: DISCONTINUED | OUTPATIENT
Start: 2022-04-11 | End: 2022-04-14

## 2022-04-11 NOTE — ED INITIAL ASSESSMENT (HPI)
Patient having mid lower abdominal pain with n/v and fever. Per patient this has been going on since the beg of the year.

## 2022-04-12 ENCOUNTER — APPOINTMENT (OUTPATIENT)
Dept: NUCLEAR MEDICINE | Facility: HOSPITAL | Age: 32
End: 2022-04-12
Attending: INTERNAL MEDICINE
Payer: COMMERCIAL

## 2022-04-12 PROCEDURE — 78264 GASTRIC EMPTYING IMG STUDY: CPT | Performed by: INTERNAL MEDICINE

## 2022-04-12 PROCEDURE — 80345 DRUG SCREENING BARBITURATES: CPT | Performed by: INTERNAL MEDICINE

## 2022-04-12 PROCEDURE — 80349 CANNABINOIDS NATURAL: CPT | Performed by: INTERNAL MEDICINE

## 2022-04-12 PROCEDURE — 80361 OPIATES 1 OR MORE: CPT | Performed by: INTERNAL MEDICINE

## 2022-04-12 PROCEDURE — 80307 DRUG TEST PRSMV CHEM ANLYZR: CPT | Performed by: INTERNAL MEDICINE

## 2022-04-12 RX ORDER — PREDNISONE 20 MG/1
20 TABLET ORAL DAILY
Status: DISCONTINUED | OUTPATIENT
Start: 2022-04-12 | End: 2022-04-14

## 2022-04-12 NOTE — PLAN OF CARE
Problem: Patient Centered Care  Goal: Patient preferences are identified and integrated in the patient's plan of care  Description: Interventions:  - What would you like us to know as we care for you? I had brain surgery in December   - Provide timely, complete, and accurate information to patient/family  - Incorporate patient and family knowledge, values, beliefs, and cultural backgrounds into the planning and delivery of care  - Encourage patient/family to participate in care and decision-making at the level they choose  - Honor patient and family perspectives and choices  Outcome: Progressing     Problem: PAIN - ADULT  Goal: Verbalizes/displays adequate comfort level or patient's stated pain goal  Description: INTERVENTIONS:  - Encourage pt to monitor pain and request assistance  - Assess pain using appropriate pain scale  - Administer analgesics based on type and severity of pain and evaluate response  - Implement non-pharmacological measures as appropriate and evaluate response  - Consider cultural and social influences on pain and pain management  - Manage/alleviate anxiety  - Utilize distraction and/or relaxation techniques  - Monitor for opioid side effects  - Notify MD/LIP if interventions unsuccessful or patient reports new pain  - Anticipate increased pain with activity and pre-medicate as appropriate  Outcome: Progressing     Problem: RISK FOR INFECTION - ADULT  Goal: Absence of fever/infection during anticipated neutropenic period  Description: INTERVENTIONS  - Monitor WBC  - Administer growth factors as ordered  - Implement neutropenic guidelines  Outcome: Progressing     Problem: SAFETY ADULT - FALL  Goal: Free from fall injury  Description: INTERVENTIONS:  - Assess pt frequently for physical needs  - Identify cognitive and physical deficits and behaviors that affect risk of falls.   - [place] fall precautions as indicated by assessment.  - Educate pt/family on patient safety including physical limitations  - Instruct pt to call for assistance with activity based on assessment  - Modify environment to reduce risk of injury  - Provide assistive devices as appropriate  - Consider OT/PT consult to assist with strengthening/mobility  - Encourage toileting schedule  Outcome: Progressing     Problem: DISCHARGE PLANNING  Goal: Discharge to home or other facility with appropriate resources  Description: INTERVENTIONS:  - Identify barriers to discharge w/pt and caregiver  - Include patient/family/discharge partner in discharge planning  - Arrange for needed discharge resources and transportation as appropriate  - Identify discharge learning needs (meds, wound care, etc)  - Arrange for interpreters to assist at discharge as needed  - Consider post-discharge preferences of patient/family/discharge partner  - Complete POLST form as appropriate  - Assess patient's ability to be responsible for managing their own health  - Refer to Case Management Department for coordinating discharge planning if the patient needs post-hospital services based on physician/LIP order or complex needs related to functional status, cognitive ability or social support system  Outcome: Progressing     Problem: GASTROINTESTINAL - ADULT  Goal: Minimal or absence of nausea and vomiting  Description: INTERVENTIONS:  - Maintain adequate hydration with IV or PO as ordered and tolerated  - Nasogastric tube to low intermittent suction as ordered  - Evaluate effectiveness of ordered antiemetic medications  - Provide nonpharmacologic comfort measures as appropriate  - Advance diet as tolerated, if ordered  - Obtain nutritional consult as needed  - Evaluate fluid balance  Outcome: Progressing       Ellie Busch is aware of his plan of care. Patient is alert and oriented x4, room air. Complains of pain in hips, PRN Norco given with relief. On a clear liquid diet. No nausea at this time. Patient states he is able to keep down pills and water but nothing else.  Up with standby assist and walker. IVF. Safety measures in place, call light within reach, will continue to monitor.

## 2022-04-12 NOTE — ED QUICK NOTES
Orders for admission, patient is aware of plan and ready to go upstairs. Any questions, please call ED RN Mckinley James at extension [phone].      Patient Covid vaccination status: Partially vaccinated     COVID Test Ordered in ED: None    COVID Suspicion at Admission: N/A    Running Infusions:  None    Mental Status/LOC at time of transport: A/O x4    Other pertinent information:   CIWA score: N/A   NIH score:  N/A

## 2022-04-12 NOTE — PLAN OF CARE
PtA&Ox4. Anxious at times. On RA. States generalized pain, PRN Norco given. Denies nausea or vomiting. NPO in the AM for NM gastric emptying study. Tolerating clear liquid diet. IV fluids continued. Voiding freely. No bowel movement over shift. Up with standby assist and walker. Plan for results from gastric emptying study to determine plans from GI. Safety measures in place. Pt calls appropriately. Problem: Patient Centered Care  Goal: Patient preferences are identified and integrated in the patient's plan of care  Description: Interventions:  - What would you like us to know as we care for you?  I had brain surgery in December   - Provide timely, complete, and accurate information to patient/family  - Incorporate patient and family knowledge, values, beliefs, and cultural backgrounds into the planning and delivery of care  - Encourage patient/family to participate in care and decision-making at the level they choose  - Honor patient and family perspectives and choices  Outcome: Progressing     Problem: PAIN - ADULT  Goal: Verbalizes/displays adequate comfort level or patient's stated pain goal  Description: INTERVENTIONS:  - Encourage pt to monitor pain and request assistance  - Assess pain using appropriate pain scale  - Administer analgesics based on type and severity of pain and evaluate response  - Implement non-pharmacological measures as appropriate and evaluate response  - Consider cultural and social influences on pain and pain management  - Manage/alleviate anxiety  - Utilize distraction and/or relaxation techniques  - Monitor for opioid side effects  - Notify MD/LIP if interventions unsuccessful or patient reports new pain  - Anticipate increased pain with activity and pre-medicate as appropriate  Outcome: Progressing     Problem: RISK FOR INFECTION - ADULT  Goal: Absence of fever/infection during anticipated neutropenic period  Description: INTERVENTIONS  - Monitor WBC  - Administer growth factors as ordered  - Implement neutropenic guidelines  Outcome: Progressing     Problem: SAFETY ADULT - FALL  Goal: Free from fall injury  Description: INTERVENTIONS:  - Assess pt frequently for physical needs  - Identify cognitive and physical deficits and behaviors that affect risk of falls.   - [place] fall precautions as indicated by assessment.  - Educate pt/family on patient safety including physical limitations  - Instruct pt to call for assistance with activity based on assessment  - Modify environment to reduce risk of injury  - Provide assistive devices as appropriate  - Consider OT/PT consult to assist with strengthening/mobility  - Encourage toileting schedule  Outcome: Progressing     Problem: DISCHARGE PLANNING  Goal: Discharge to home or other facility with appropriate resources  Description: INTERVENTIONS:  - Identify barriers to discharge w/pt and caregiver  - Include patient/family/discharge partner in discharge planning  - Arrange for needed discharge resources and transportation as appropriate  - Identify discharge learning needs (meds, wound care, etc)  - Arrange for interpreters to assist at discharge as needed  - Consider post-discharge preferences of patient/family/discharge partner  - Complete POLST form as appropriate  - Assess patient's ability to be responsible for managing their own health  - Refer to Case Management Department for coordinating discharge planning if the patient needs post-hospital services based on physician/LIP order or complex needs related to functional status, cognitive ability or social support system  Outcome: Progressing     Problem: GASTROINTESTINAL - ADULT  Goal: Minimal or absence of nausea and vomiting  Description: INTERVENTIONS:  - Maintain adequate hydration with IV or PO as ordered and tolerated  - Nasogastric tube to low intermittent suction as ordered  - Evaluate effectiveness of ordered antiemetic medications  - Provide nonpharmacologic comfort measures as appropriate  - Advance diet as tolerated, if ordered  - Obtain nutritional consult as needed  - Evaluate fluid balance  Outcome: Progressing

## 2022-04-13 RX ORDER — METOCLOPRAMIDE HYDROCHLORIDE 5 MG/ML
5 INJECTION INTRAMUSCULAR; INTRAVENOUS
Status: DISCONTINUED | OUTPATIENT
Start: 2022-04-13 | End: 2022-04-14

## 2022-04-13 NOTE — PLAN OF CARE
Problem: Patient Centered Care  Goal: Patient preferences are identified and integrated in the patient's plan of care  Description: Interventions:  - What would you like us to know as we care for you? I had brain surgery in December   - Provide timely, complete, and accurate information to patient/family  - Incorporate patient and family knowledge, values, beliefs, and cultural backgrounds into the planning and delivery of care  - Encourage patient/family to participate in care and decision-making at the level they choose  - Honor patient and family perspectives and choices  Outcome: Progressing     Problem: PAIN - ADULT  Goal: Verbalizes/displays adequate comfort level or patient's stated pain goal  Description: INTERVENTIONS:  - Encourage pt to monitor pain and request assistance  - Assess pain using appropriate pain scale  - Administer analgesics based on type and severity of pain and evaluate response  - Implement non-pharmacological measures as appropriate and evaluate response  - Consider cultural and social influences on pain and pain management  - Manage/alleviate anxiety  - Utilize distraction and/or relaxation techniques  - Monitor for opioid side effects  - Notify MD/LIP if interventions unsuccessful or patient reports new pain  - Anticipate increased pain with activity and pre-medicate as appropriate  Outcome: Progressing     Problem: RISK FOR INFECTION - ADULT  Goal: Absence of fever/infection during anticipated neutropenic period  Description: INTERVENTIONS  - Monitor WBC  - Administer growth factors as ordered  - Implement neutropenic guidelines  Outcome: Progressing     Problem: SAFETY ADULT - FALL  Goal: Free from fall injury  Description: INTERVENTIONS:  - Assess pt frequently for physical needs  - Identify cognitive and physical deficits and behaviors that affect risk of falls.   - [place] fall precautions as indicated by assessment.  - Educate pt/family on patient safety including physical limitations  - Instruct pt to call for assistance with activity based on assessment  - Modify environment to reduce risk of injury  - Provide assistive devices as appropriate  - Consider OT/PT consult to assist with strengthening/mobility  - Encourage toileting schedule  Outcome: Progressing     Problem: DISCHARGE PLANNING  Goal: Discharge to home or other facility with appropriate resources  Description: INTERVENTIONS:  - Identify barriers to discharge w/pt and caregiver  - Include patient/family/discharge partner in discharge planning  - Arrange for needed discharge resources and transportation as appropriate  - Identify discharge learning needs (meds, wound care, etc)  - Arrange for interpreters to assist at discharge as needed  - Consider post-discharge preferences of patient/family/discharge partner  - Complete POLST form as appropriate  - Assess patient's ability to be responsible for managing their own health  - Refer to Case Management Department for coordinating discharge planning if the patient needs post-hospital services based on physician/LIP order or complex needs related to functional status, cognitive ability or social support system  Outcome: Progressing     Problem: GASTROINTESTINAL - ADULT  Goal: Minimal or absence of nausea and vomiting  Description: INTERVENTIONS:  - Maintain adequate hydration with IV or PO as ordered and tolerated  - Nasogastric tube to low intermittent suction as ordered  - Evaluate effectiveness of ordered antiemetic medications  - Provide nonpharmacologic comfort measures as appropriate  - Advance diet as tolerated, if ordered  - Obtain nutritional consult as needed  - Evaluate fluid balance  Outcome: Progressing     Lena Barksdale is aware of his plan of care. Patient is alert and oriented x4, anxious at times PRN Xanax given. Room air. Complains of pain in hips, PRN Norco given with relief. On a clear liquid diet. Tolerated dinner well, no nausea or vomiting.  Up with standby assist and walker. IVF. Safety measures in place, call light within reach, will continue to monitor.

## 2022-04-14 ENCOUNTER — APPOINTMENT (OUTPATIENT)
Dept: NUCLEAR MEDICINE | Facility: HOSPITAL | Age: 32
End: 2022-04-14
Attending: INTERNAL MEDICINE
Payer: COMMERCIAL

## 2022-04-14 VITALS
BODY MASS INDEX: 17.97 KG/M2 | RESPIRATION RATE: 18 BRPM | WEIGHT: 125.5 LBS | HEART RATE: 71 BPM | TEMPERATURE: 98 F | OXYGEN SATURATION: 98 % | HEIGHT: 70 IN | DIASTOLIC BLOOD PRESSURE: 62 MMHG | SYSTOLIC BLOOD PRESSURE: 109 MMHG

## 2022-04-14 LAB
ALBUMIN SERPL-MCNC: 2.7 G/DL (ref 3.4–5)
ALBUMIN/GLOB SERPL: 0.7 {RATIO} (ref 1–2)
ALP LIVER SERPL-CCNC: 112 U/L
ALT SERPL-CCNC: 7 U/L
AMPHETAMINES, S/P, SCREEN: NEGATIVE NG/ML
ANION GAP SERPL CALC-SCNC: 6 MMOL/L (ref 0–18)
AST SERPL-CCNC: 12 U/L (ref 15–37)
BARBITURATES, S/P, SCREEN: POSITIVE NG/ML
BASOPHILS # BLD AUTO: 0.02 X10(3) UL (ref 0–0.2)
BASOPHILS NFR BLD AUTO: 0.3 %
BENZODIAZEPINES, S/P, SCREEN: NEGATIVE NG/ML
BILIRUB SERPL-MCNC: 0.2 MG/DL (ref 0.1–2)
BUN BLD-MCNC: 2 MG/DL (ref 7–18)
BUN/CREAT SERPL: 3.6 (ref 10–20)
BUPRENORPHINE, S/P, SCREEN: NEGATIVE NG/ML
CALCIUM BLD-MCNC: 8.6 MG/DL (ref 8.5–10.1)
CANNABINOIDS, S/P, SCREEN: POSITIVE NG/ML
CHLORIDE SERPL-SCNC: 107 MMOL/L (ref 98–112)
CO2 SERPL-SCNC: 29 MMOL/L (ref 21–32)
COCAINE, S/P, SCREEN: NEGATIVE NG/ML
CREAT BLD-MCNC: 0.55 MG/DL
DEPRECATED RDW RBC AUTO: 46.7 FL (ref 35.1–46.3)
EOSINOPHIL # BLD AUTO: 0.01 X10(3) UL (ref 0–0.7)
EOSINOPHIL NFR BLD AUTO: 0.1 %
ERYTHROCYTE [DISTWIDTH] IN BLOOD BY AUTOMATED COUNT: 13.6 % (ref 11–15)
GLOBULIN PLAS-MCNC: 3.7 G/DL (ref 2.8–4.4)
GLUCOSE BLD-MCNC: 79 MG/DL (ref 70–99)
HCT VFR BLD AUTO: 36.6 %
HGB BLD-MCNC: 11.2 G/DL
IMM GRANULOCYTES # BLD AUTO: 0.01 X10(3) UL (ref 0–1)
IMM GRANULOCYTES NFR BLD: 0.1 %
LYMPHOCYTES # BLD AUTO: 2.43 X10(3) UL (ref 1–4)
LYMPHOCYTES NFR BLD AUTO: 36.4 %
MCH RBC QN AUTO: 28.3 PG (ref 26–34)
MCHC RBC AUTO-ENTMCNC: 30.6 G/DL (ref 31–37)
MCV RBC AUTO: 92.4 FL
METHADONE, S/P, SCREEN: NEGATIVE NG/ML
METHAMPHETAMINE, S/P, SCREEN: NEGATIVE NG/ML
MONOCYTES # BLD AUTO: 0.38 X10(3) UL (ref 0.1–1)
MONOCYTES NFR BLD AUTO: 5.7 %
NEUTROPHILS # BLD AUTO: 3.82 X10 (3) UL (ref 1.5–7.7)
NEUTROPHILS # BLD AUTO: 3.82 X10(3) UL (ref 1.5–7.7)
NEUTROPHILS NFR BLD AUTO: 57.4 %
OPIATES, S/P, SCREEN: POSITIVE NG/ML
OSMOLALITY SERPL CALC.SUM OF ELEC: 289 MOSM/KG (ref 275–295)
OXYCODONE, S/P, SCREEN: NEGATIVE NG/ML
PHENCYCLIDINE, S/P, SCREEN: NEGATIVE NG/ML
PLATELET # BLD AUTO: 340 10(3)UL (ref 150–450)
POTASSIUM SERPL-SCNC: 3 MMOL/L (ref 3.5–5.1)
PROT SERPL-MCNC: 6.4 G/DL (ref 6.4–8.2)
RBC # BLD AUTO: 3.96 X10(6)UL
SODIUM SERPL-SCNC: 142 MMOL/L (ref 136–145)
WBC # BLD AUTO: 6.7 X10(3) UL (ref 4–11)

## 2022-04-14 PROCEDURE — 78226 HEPATOBILIARY SYSTEM IMAGING: CPT | Performed by: INTERNAL MEDICINE

## 2022-04-14 PROCEDURE — 85025 COMPLETE CBC W/AUTO DIFF WBC: CPT | Performed by: INTERNAL MEDICINE

## 2022-04-14 PROCEDURE — 80053 COMPREHEN METABOLIC PANEL: CPT | Performed by: INTERNAL MEDICINE

## 2022-04-14 RX ORDER — MORPHINE SULFATE 2 MG/ML
2 INJECTION, SOLUTION INTRAMUSCULAR; INTRAVENOUS EVERY 4 HOURS PRN
Status: DISCONTINUED | OUTPATIENT
Start: 2022-04-14 | End: 2022-04-14

## 2022-04-14 RX ORDER — METOCLOPRAMIDE 10 MG/1
5 TABLET ORAL
Status: DISCONTINUED | OUTPATIENT
Start: 2022-04-14 | End: 2022-04-14

## 2022-04-14 RX ORDER — METOCLOPRAMIDE 5 MG/1
5 TABLET ORAL
Qty: 90 TABLET | Refills: 2 | Status: SHIPPED | OUTPATIENT
Start: 2022-04-14

## 2022-04-14 RX ORDER — MORPHINE SULFATE 4 MG/ML
4 INJECTION, SOLUTION INTRAMUSCULAR; INTRAVENOUS EVERY 4 HOURS PRN
Status: DISCONTINUED | OUTPATIENT
Start: 2022-04-14 | End: 2022-04-14

## 2022-04-14 RX ORDER — POTASSIUM CHLORIDE 14.9 MG/ML
20 INJECTION INTRAVENOUS ONCE
Status: DISCONTINUED | OUTPATIENT
Start: 2022-04-14 | End: 2022-04-14

## 2022-04-14 NOTE — CM/SW NOTE
04/14/22 1300   CM/SW Referral Data   Referral Source    Reason for Referral Discharge planning   Informant Patient   Pertinent Medical Hx   Does patient have an established PCP? Yes  Christian Sue)   Patient Info   Patient's Current Mental Status at Time of Assessment Alert;Oriented   Patient's [address]   Patient lives with Spouse/Significant other   Patient Status Prior to Admission   Independent with ADLs and Mobility Yes   Discharge Needs   Anticipated D/C needs No anticipated discharge needs     Pt discussed during nursing rounds. Dx intractable N/V, hx of brain tumor. From home w/spouse, independent and active prior to dx. No home care needs anticipated on dc. Plan: Home w/spouse pending medical clearance. / to remain available for support and/or discharge planning.      Levi Bear, BSN    [phone]

## 2022-04-14 NOTE — PLAN OF CARE
Problem: Patient Centered Care  Goal: Patient preferences are identified and integrated in the patient's plan of care  Description: Interventions:  - What would you like us to know as we care for you?  I had brain surgery in December   - Provide timely, complete, and accurate information to patient/family  - Incorporate patient and family knowledge, values, beliefs, and cultural backgrounds into the planning and delivery of care  - Encourage patient/family to participate in care and decision-making at the level they choose  - Honor patient and family perspectives and choices  4/14/2022 1545 by Nelly Goode RN  Outcome: Adequate for Discharge  4/14/2022 1353 by Nelly Goode RN  Outcome: Progressing     Problem: PAIN - ADULT  Goal: Verbalizes/displays adequate comfort level or patient's stated pain goal  Description: INTERVENTIONS:  - Encourage pt to monitor pain and request assistance  - Assess pain using appropriate pain scale  - Administer analgesics based on type and severity of pain and evaluate response  - Implement non-pharmacological measures as appropriate and evaluate response  - Consider cultural and social influences on pain and pain management  - Manage/alleviate anxiety  - Utilize distraction and/or relaxation techniques  - Monitor for opioid side effects  - Notify MD/LIP if interventions unsuccessful or patient reports new pain  - Anticipate increased pain with activity and pre-medicate as appropriate  4/14/2022 1545 by Nelly Goode RN  Outcome: Adequate for Discharge  4/14/2022 1353 by Nelly Goode RN  Outcome: Progressing     Problem: RISK FOR INFECTION - ADULT  Goal: Absence of fever/infection during anticipated neutropenic period  Description: INTERVENTIONS  - Monitor WBC  - Administer growth factors as ordered  - Implement neutropenic guidelines  4/14/2022 1545 by Nelly Goode RN  Outcome: Adequate for Discharge  4/14/2022 1353 by Nelly Goode RN  Outcome: Progressing     Problem: SAFETY ADULT - FALL  Goal: Free from fall injury  Description: INTERVENTIONS:  - Assess pt frequently for physical needs  - Identify cognitive and physical deficits and behaviors that affect risk of falls.   - [place] fall precautions as indicated by assessment.  - Educate pt/family on patient safety including physical limitations  - Instruct pt to call for assistance with activity based on assessment  - Modify environment to reduce risk of injury  - Provide assistive devices as appropriate  - Consider OT/PT consult to assist with strengthening/mobility  - Encourage toileting schedule  4/14/2022 1545 by Dave Meade RN  Outcome: Adequate for Discharge  4/14/2022 1353 by Dave Meade RN  Outcome: Progressing     Problem: DISCHARGE PLANNING  Goal: Discharge to home or other facility with appropriate resources  Description: INTERVENTIONS:  - Identify barriers to discharge w/pt and caregiver  - Include patient/family/discharge partner in discharge planning  - Arrange for needed discharge resources and transportation as appropriate  - Identify discharge learning needs (meds, wound care, etc)  - Arrange for interpreters to assist at discharge as needed  - Consider post-discharge preferences of patient/family/discharge partner  - Complete POLST form as appropriate  - Assess patient's ability to be responsible for managing their own health  - Refer to Case Management Department for coordinating discharge planning if the patient needs post-hospital services based on physician/LIP order or complex needs related to functional status, cognitive ability or social support system  4/14/2022 1545 by Dave Meade RN  Outcome: Adequate for Discharge  4/14/2022 1353 by Dave Meade RN  Outcome: Progressing     Problem: GASTROINTESTINAL - ADULT  Goal: Minimal or absence of nausea and vomiting  Description: INTERVENTIONS:  - Maintain adequate hydration with IV or PO as ordered and tolerated  - Nasogastric tube to low intermittent suction as ordered  - Evaluate effectiveness of ordered antiemetic medications  - Provide nonpharmacologic comfort measures as appropriate  - Advance diet as tolerated, if ordered  - Obtain nutritional consult as needed  - Evaluate fluid balance  4/14/2022 1545 by Mathieu Duran RN  Outcome: Adequate for Discharge  4/14/2022 1353 by Mathieu Duran RN  Outcome: Progressing     Discharge paperwork provided, patient cleared for discharge. Tolerating soft, low-fiber diet. Electronic prescriptions sent to pharmacy. Will follow-up outpatient for HIDA scan and discharge via family's private vehicle @ 1800.

## 2022-04-14 NOTE — CM/SW NOTE
04/14/22 1500   Discharge disposition   Expected discharge disposition Home or Self   Discharge transportation Private car     Pt is stable for dc today. MD dc order entered. No home care needs identified on dc. Pt's mother will provide transport at [facility] per RN. Plan: Home w/spouse today. / to remain available for support and/or discharge planning.      GARRY ElN    [phone]

## 2022-04-14 NOTE — PLAN OF CARE
No acute changes overnight. No complaints of nausea/vomiting. IVF continued. Tolerating diet until midnight -- remains NPO for planned HIDA scan this AM. Norco PRN for pain management. Voiding freely; up ad greer. Problem: Patient Centered Care  Goal: Patient preferences are identified and integrated in the patient's plan of care  Description: Interventions:  - What would you like us to know as we care for you?  I had brain surgery in December   - Provide timely, complete, and accurate information to patient/family  - Incorporate patient and family knowledge, values, beliefs, and cultural backgrounds into the planning and delivery of care  - Encourage patient/family to participate in care and decision-making at the level they choose  - Honor patient and family perspectives and choices  Outcome: Progressing     Problem: PAIN - ADULT  Goal: Verbalizes/displays adequate comfort level or patient's stated pain goal  Description: INTERVENTIONS:  - Encourage pt to monitor pain and request assistance  - Assess pain using appropriate pain scale  - Administer analgesics based on type and severity of pain and evaluate response  - Implement non-pharmacological measures as appropriate and evaluate response  - Consider cultural and social influences on pain and pain management  - Manage/alleviate anxiety  - Utilize distraction and/or relaxation techniques  - Monitor for opioid side effects  - Notify MD/LIP if interventions unsuccessful or patient reports new pain  - Anticipate increased pain with activity and pre-medicate as appropriate  Outcome: Progressing     Problem: RISK FOR INFECTION - ADULT  Goal: Absence of fever/infection during anticipated neutropenic period  Description: INTERVENTIONS  - Monitor WBC  - Administer growth factors as ordered  - Implement neutropenic guidelines  Outcome: Progressing     Problem: SAFETY ADULT - FALL  Goal: Free from fall injury  Description: INTERVENTIONS:  - Assess pt frequently for physical needs  - Identify cognitive and physical deficits and behaviors that affect risk of falls.   - [place] fall precautions as indicated by assessment.  - Educate pt/family on patient safety including physical limitations  - Instruct pt to call for assistance with activity based on assessment  - Modify environment to reduce risk of injury  - Provide assistive devices as appropriate  - Consider OT/PT consult to assist with strengthening/mobility  - Encourage toileting schedule  Outcome: Progressing     Problem: DISCHARGE PLANNING  Goal: Discharge to home or other facility with appropriate resources  Description: INTERVENTIONS:  - Identify barriers to discharge w/pt and caregiver  - Include patient/family/discharge partner in discharge planning  - Arrange for needed discharge resources and transportation as appropriate  - Identify discharge learning needs (meds, wound care, etc)  - Arrange for interpreters to assist at discharge as needed  - Consider post-discharge preferences of patient/family/discharge partner  - Complete POLST form as appropriate  - Assess patient's ability to be responsible for managing their own health  - Refer to Case Management Department for coordinating discharge planning if the patient needs post-hospital services based on physician/LIP order or complex needs related to functional status, cognitive ability or social support system  Outcome: Progressing     Problem: GASTROINTESTINAL - ADULT  Goal: Minimal or absence of nausea and vomiting  Description: INTERVENTIONS:  - Maintain adequate hydration with IV or PO as ordered and tolerated  - Nasogastric tube to low intermittent suction as ordered  - Evaluate effectiveness of ordered antiemetic medications  - Provide nonpharmacologic comfort measures as appropriate  - Advance diet as tolerated, if ordered  - Obtain nutritional consult as needed  - Evaluate fluid balance  Outcome: Progressing

## 2022-04-14 NOTE — PLAN OF CARE
Problem: Patient Centered Care  Goal: Patient preferences are identified and integrated in the patient's plan of care  Description: Interventions:  - What would you like us to know as we care for you? I had brain surgery in December   - Provide timely, complete, and accurate information to patient/family  - Incorporate patient and family knowledge, values, beliefs, and cultural backgrounds into the planning and delivery of care  - Encourage patient/family to participate in care and decision-making at the level they choose  - Honor patient and family perspectives and choices  Outcome: Progressing     Problem: PAIN - ADULT  Goal: Verbalizes/displays adequate comfort level or patient's stated pain goal  Description: INTERVENTIONS:  - Encourage pt to monitor pain and request assistance  - Assess pain using appropriate pain scale  - Administer analgesics based on type and severity of pain and evaluate response  - Implement non-pharmacological measures as appropriate and evaluate response  - Consider cultural and social influences on pain and pain management  - Manage/alleviate anxiety  - Utilize distraction and/or relaxation techniques  - Monitor for opioid side effects  - Notify MD/LIP if interventions unsuccessful or patient reports new pain  - Anticipate increased pain with activity and pre-medicate as appropriate  Outcome: Progressing     Problem: RISK FOR INFECTION - ADULT  Goal: Absence of fever/infection during anticipated neutropenic period  Description: INTERVENTIONS  - Monitor WBC  - Administer growth factors as ordered  - Implement neutropenic guidelines  Outcome: Progressing     Problem: SAFETY ADULT - FALL  Goal: Free from fall injury  Description: INTERVENTIONS:  - Assess pt frequently for physical needs  - Identify cognitive and physical deficits and behaviors that affect risk of falls.   - [place] fall precautions as indicated by assessment.  - Educate pt/family on patient safety including physical limitations  - Instruct pt to call for assistance with activity based on assessment  - Modify environment to reduce risk of injury  - Provide assistive devices as appropriate  - Consider OT/PT consult to assist with strengthening/mobility  - Encourage toileting schedule  Outcome: Progressing     Problem: DISCHARGE PLANNING  Goal: Discharge to home or other facility with appropriate resources  Description: INTERVENTIONS:  - Identify barriers to discharge w/pt and caregiver  - Include patient/family/discharge partner in discharge planning  - Arrange for needed discharge resources and transportation as appropriate  - Identify discharge learning needs (meds, wound care, etc)  - Arrange for interpreters to assist at discharge as needed  - Consider post-discharge preferences of patient/family/discharge partner  - Complete POLST form as appropriate  - Assess patient's ability to be responsible for managing their own health  - Refer to Case Management Department for coordinating discharge planning if the patient needs post-hospital services based on physician/LIP order or complex needs related to functional status, cognitive ability or social support system  Outcome: Progressing     Problem: GASTROINTESTINAL - ADULT  Goal: Minimal or absence of nausea and vomiting  Description: INTERVENTIONS:  - Maintain adequate hydration with IV or PO as ordered and tolerated  - Nasogastric tube to low intermittent suction as ordered  - Evaluate effectiveness of ordered antiemetic medications  - Provide nonpharmacologic comfort measures as appropriate  - Advance diet as tolerated, if ordered  - Obtain nutritional consult as needed  - Evaluate fluid balance  Outcome: Progressing     Alert and oriented x4. Reports moderate-severe pain located at arthritic joints. Pain managed with prn norco. Tolerating low-fiber soft diet. Patient is ambulatory with rolling walker. Potassium covered per electrolyte protocol.

## 2022-04-14 NOTE — PLAN OF CARE
Jaquan Das is alert/oriented. Vitals stable. Tolerating diet, scheduled reglan given as ordered. No nausea/vomiting. Ambulating with walker and standby assist. IVF infusing. Pain control with norco. Voiding freely. Plan for HIDA scan in AM - NPO at midnight. Pt aware of plan. Bed low, locked, all safety measures in place. Problem: Patient Centered Care  Goal: Patient preferences are identified and integrated in the patient's plan of care  Description: Interventions:  - What would you like us to know as we care for you?  I had brain surgery in December   - Provide timely, complete, and accurate information to patient/family  - Incorporate patient and family knowledge, values, beliefs, and cultural backgrounds into the planning and delivery of care  - Encourage patient/family to participate in care and decision-making at the level they choose  - Honor patient and family perspectives and choices  Outcome: Progressing     Problem: PAIN - ADULT  Goal: Verbalizes/displays adequate comfort level or patient's stated pain goal  Description: INTERVENTIONS:  - Encourage pt to monitor pain and request assistance  - Assess pain using appropriate pain scale  - Administer analgesics based on type and severity of pain and evaluate response  - Implement non-pharmacological measures as appropriate and evaluate response  - Consider cultural and social influences on pain and pain management  - Manage/alleviate anxiety  - Utilize distraction and/or relaxation techniques  - Monitor for opioid side effects  - Notify MD/LIP if interventions unsuccessful or patient reports new pain  - Anticipate increased pain with activity and pre-medicate as appropriate  Outcome: Progressing     Problem: RISK FOR INFECTION - ADULT  Goal: Absence of fever/infection during anticipated neutropenic period  Description: INTERVENTIONS  - Monitor WBC  - Administer growth factors as ordered  - Implement neutropenic guidelines  Outcome: Progressing     Problem: SAFETY ADULT - FALL  Goal: Free from fall injury  Description: INTERVENTIONS:  - Assess pt frequently for physical needs  - Identify cognitive and physical deficits and behaviors that affect risk of falls.   - [place] fall precautions as indicated by assessment.  - Educate pt/family on patient safety including physical limitations  - Instruct pt to call for assistance with activity based on assessment  - Modify environment to reduce risk of injury  - Provide assistive devices as appropriate  - Consider OT/PT consult to assist with strengthening/mobility  - Encourage toileting schedule  Outcome: Progressing     Problem: DISCHARGE PLANNING  Goal: Discharge to home or other facility with appropriate resources  Description: INTERVENTIONS:  - Identify barriers to discharge w/pt and caregiver  - Include patient/family/discharge partner in discharge planning  - Arrange for needed discharge resources and transportation as appropriate  - Identify discharge learning needs (meds, wound care, etc)  - Arrange for interpreters to assist at discharge as needed  - Consider post-discharge preferences of patient/family/discharge partner  - Complete POLST form as appropriate  - Assess patient's ability to be responsible for managing their own health  - Refer to Case Management Department for coordinating discharge planning if the patient needs post-hospital services based on physician/LIP order or complex needs related to functional status, cognitive ability or social support system  Outcome: Progressing     Problem: GASTROINTESTINAL - ADULT  Goal: Minimal or absence of nausea and vomiting  Description: INTERVENTIONS:  - Maintain adequate hydration with IV or PO as ordered and tolerated  - Nasogastric tube to low intermittent suction as ordered  - Evaluate effectiveness of ordered antiemetic medications  - Provide nonpharmacologic comfort measures as appropriate  - Advance diet as tolerated, if ordered  - Obtain nutritional consult as needed  - Evaluate fluid balance  Outcome: Progressing

## 2022-04-14 NOTE — CM/SW NOTE
For discharge planning needs call Meenu Brink with [employer] [facility] at [phone].

## 2022-04-16 LAB
DRUG CONFIRMATION, CANNABINOID: 6 NG/ML
Lab: 72 NG/ML
Lab: <2 NG/ML
OXYCODONE CONF: <2 NG/ML

## 2022-04-18 LAB
Lab: 134 NG/ML
Lab: <50 NG/ML
Lab: <50 NG/ML

## 2022-05-03 ENCOUNTER — LAB ENCOUNTER (OUTPATIENT)
Dept: LAB | Age: 32
End: 2022-05-03
Attending: SURGERY
Payer: COMMERCIAL

## 2022-05-03 DIAGNOSIS — Z01.818 PRE-OP TESTING: ICD-10-CM

## 2022-05-03 LAB — SARS-COV-2 RNA RESP QL NAA+PROBE: NOT DETECTED

## 2022-05-05 ENCOUNTER — HOSPITAL ENCOUNTER (OUTPATIENT)
Facility: HOSPITAL | Age: 32
Setting detail: HOSPITAL OUTPATIENT SURGERY
Discharge: HOME | End: 2022-05-05
Attending: SURGERY | Admitting: SURGERY
Payer: COMMERCIAL

## 2022-05-05 ENCOUNTER — APPOINTMENT (OUTPATIENT)
Dept: GENERAL RADIOLOGY | Facility: HOSPITAL | Age: 32
End: 2022-05-05
Attending: SURGERY
Payer: COMMERCIAL

## 2022-05-05 ENCOUNTER — ANESTHESIA EVENT (OUTPATIENT)
Dept: SURGERY | Facility: HOSPITAL | Age: 32
End: 2022-05-05
Payer: COMMERCIAL

## 2022-05-05 ENCOUNTER — ANESTHESIA (OUTPATIENT)
Dept: SURGERY | Facility: HOSPITAL | Age: 32
End: 2022-05-05
Payer: COMMERCIAL

## 2022-05-05 VITALS
OXYGEN SATURATION: 100 % | TEMPERATURE: 97 F | WEIGHT: 122 LBS | BODY MASS INDEX: 17.47 KG/M2 | DIASTOLIC BLOOD PRESSURE: 67 MMHG | HEART RATE: 78 BPM | SYSTOLIC BLOOD PRESSURE: 110 MMHG | HEIGHT: 70 IN | RESPIRATION RATE: 18 BRPM

## 2022-05-05 DIAGNOSIS — Z01.818 PRE-OP TESTING: Primary | ICD-10-CM

## 2022-05-05 PROCEDURE — 8E0W4CZ ROBOTIC ASSISTED PROCEDURE OF TRUNK REGION, PERCUTANEOUS ENDOSCOPIC APPROACH: ICD-10-PCS | Performed by: SURGERY

## 2022-05-05 PROCEDURE — BF111ZZ FLUOROSCOPY OF BILIARY AND PANCREATIC DUCTS USING LOW OSMOLAR CONTRAST: ICD-10-PCS | Performed by: SURGERY

## 2022-05-05 PROCEDURE — 74300 X-RAY BILE DUCTS/PANCREAS: CPT | Performed by: SURGERY

## 2022-05-05 PROCEDURE — BF50200 OTHER IMAGING OF BILE DUCTS USING FLUORESCING AGENT, INDOCYANINE GREEN DYE, INTRAOPERATIVE: ICD-10-PCS | Performed by: SURGERY

## 2022-05-05 PROCEDURE — 0FT44ZZ RESECTION OF GALLBLADDER, PERCUTANEOUS ENDOSCOPIC APPROACH: ICD-10-PCS | Performed by: SURGERY

## 2022-05-05 PROCEDURE — 88304 TISSUE EXAM BY PATHOLOGIST: CPT | Performed by: SURGERY

## 2022-05-05 RX ORDER — INDOCYANINE GREEN AND WATER 25 MG
5 KIT INJECTION ONCE
Status: COMPLETED | OUTPATIENT
Start: 2022-05-05 | End: 2022-05-05

## 2022-05-05 RX ORDER — HYDROMORPHONE HYDROCHLORIDE 1 MG/ML
0.2 INJECTION, SOLUTION INTRAMUSCULAR; INTRAVENOUS; SUBCUTANEOUS EVERY 5 MIN PRN
Status: DISCONTINUED | OUTPATIENT
Start: 2022-05-05 | End: 2022-05-05

## 2022-05-05 RX ORDER — TRAMADOL HYDROCHLORIDE 50 MG/1
50 TABLET ORAL EVERY 6 HOURS PRN
Qty: 10 TABLET | Refills: 0 | Status: SHIPPED | OUTPATIENT
Start: 2022-05-05

## 2022-05-05 RX ORDER — ACETAMINOPHEN 500 MG
1000 TABLET ORAL EVERY 8 HOURS SCHEDULED
Status: CANCELLED | OUTPATIENT
Start: 2022-05-05

## 2022-05-05 RX ORDER — ONDANSETRON 2 MG/ML
4 INJECTION INTRAMUSCULAR; INTRAVENOUS EVERY 6 HOURS PRN
Status: CANCELLED | OUTPATIENT
Start: 2022-05-05

## 2022-05-05 RX ORDER — ROCURONIUM BROMIDE 10 MG/ML
INJECTION, SOLUTION INTRAVENOUS AS NEEDED
Status: DISCONTINUED | OUTPATIENT
Start: 2022-05-05 | End: 2022-05-05 | Stop reason: SURG

## 2022-05-05 RX ORDER — MORPHINE SULFATE 4 MG/ML
4 INJECTION, SOLUTION INTRAMUSCULAR; INTRAVENOUS EVERY 10 MIN PRN
Status: DISCONTINUED | OUTPATIENT
Start: 2022-05-05 | End: 2022-05-05

## 2022-05-05 RX ORDER — SODIUM CHLORIDE, SODIUM LACTATE, POTASSIUM CHLORIDE, CALCIUM CHLORIDE 600; 310; 30; 20 MG/100ML; MG/100ML; MG/100ML; MG/100ML
INJECTION, SOLUTION INTRAVENOUS CONTINUOUS
Status: DISCONTINUED | OUTPATIENT
Start: 2022-05-05 | End: 2022-05-05

## 2022-05-05 RX ORDER — TRAMADOL HYDROCHLORIDE 50 MG/1
50 TABLET ORAL EVERY 6 HOURS
Status: CANCELLED | OUTPATIENT
Start: 2022-05-05

## 2022-05-05 RX ORDER — SODIUM CHLORIDE, SODIUM LACTATE, POTASSIUM CHLORIDE, CALCIUM CHLORIDE 600; 310; 30; 20 MG/100ML; MG/100ML; MG/100ML; MG/100ML
INJECTION, SOLUTION INTRAVENOUS CONTINUOUS PRN
Status: DISCONTINUED | OUTPATIENT
Start: 2022-05-05 | End: 2022-05-05 | Stop reason: SURG

## 2022-05-05 RX ORDER — ONDANSETRON 2 MG/ML
INJECTION INTRAMUSCULAR; INTRAVENOUS AS NEEDED
Status: DISCONTINUED | OUTPATIENT
Start: 2022-05-05 | End: 2022-05-05 | Stop reason: SURG

## 2022-05-05 RX ORDER — NALOXONE HYDROCHLORIDE 0.4 MG/ML
80 INJECTION, SOLUTION INTRAMUSCULAR; INTRAVENOUS; SUBCUTANEOUS AS NEEDED
Status: DISCONTINUED | OUTPATIENT
Start: 2022-05-05 | End: 2022-05-05

## 2022-05-05 RX ORDER — ONDANSETRON 2 MG/ML
4 INJECTION INTRAMUSCULAR; INTRAVENOUS ONCE
Status: COMPLETED | OUTPATIENT
Start: 2022-05-05 | End: 2022-05-05

## 2022-05-05 RX ORDER — HYDROMORPHONE HYDROCHLORIDE 1 MG/ML
0.4 INJECTION, SOLUTION INTRAMUSCULAR; INTRAVENOUS; SUBCUTANEOUS EVERY 5 MIN PRN
Status: DISCONTINUED | OUTPATIENT
Start: 2022-05-05 | End: 2022-05-05

## 2022-05-05 RX ORDER — ACETAMINOPHEN 500 MG
1000 TABLET ORAL ONCE
Status: COMPLETED | OUTPATIENT
Start: 2022-05-05 | End: 2022-05-05

## 2022-05-05 RX ORDER — PROCHLORPERAZINE EDISYLATE 5 MG/ML
5 INJECTION INTRAMUSCULAR; INTRAVENOUS EVERY 8 HOURS PRN
Status: CANCELLED | OUTPATIENT
Start: 2022-05-05

## 2022-05-05 RX ORDER — NEOSTIGMINE METHYLSULFATE 1 MG/ML
INJECTION INTRAVENOUS AS NEEDED
Status: DISCONTINUED | OUTPATIENT
Start: 2022-05-05 | End: 2022-05-05 | Stop reason: SURG

## 2022-05-05 RX ORDER — BUPIVACAINE HYDROCHLORIDE AND EPINEPHRINE 2.5; 5 MG/ML; UG/ML
INJECTION, SOLUTION INFILTRATION; PERINEURAL AS NEEDED
Status: DISCONTINUED | OUTPATIENT
Start: 2022-05-05 | End: 2022-05-05 | Stop reason: HOSPADM

## 2022-05-05 RX ORDER — MIDAZOLAM HYDROCHLORIDE 1 MG/ML
INJECTION INTRAMUSCULAR; INTRAVENOUS AS NEEDED
Status: DISCONTINUED | OUTPATIENT
Start: 2022-05-05 | End: 2022-05-05 | Stop reason: SURG

## 2022-05-05 RX ORDER — NAPROXEN 500 MG/1
500 TABLET ORAL 2 TIMES DAILY WITH MEALS
Status: CANCELLED | OUTPATIENT
Start: 2022-05-05

## 2022-05-05 RX ORDER — MORPHINE SULFATE 4 MG/ML
2 INJECTION, SOLUTION INTRAMUSCULAR; INTRAVENOUS EVERY 10 MIN PRN
Status: DISCONTINUED | OUTPATIENT
Start: 2022-05-05 | End: 2022-05-05

## 2022-05-05 RX ORDER — GLYCOPYRROLATE 0.2 MG/ML
INJECTION, SOLUTION INTRAMUSCULAR; INTRAVENOUS AS NEEDED
Status: DISCONTINUED | OUTPATIENT
Start: 2022-05-05 | End: 2022-05-05 | Stop reason: SURG

## 2022-05-05 RX ORDER — MORPHINE SULFATE 10 MG/ML
6 INJECTION, SOLUTION INTRAMUSCULAR; INTRAVENOUS EVERY 10 MIN PRN
Status: DISCONTINUED | OUTPATIENT
Start: 2022-05-05 | End: 2022-05-05

## 2022-05-05 RX ORDER — KETOROLAC TROMETHAMINE 30 MG/ML
INJECTION, SOLUTION INTRAMUSCULAR; INTRAVENOUS AS NEEDED
Status: DISCONTINUED | OUTPATIENT
Start: 2022-05-05 | End: 2022-05-05 | Stop reason: SURG

## 2022-05-05 RX ORDER — CEFAZOLIN SODIUM/WATER 2 G/20 ML
2 SYRINGE (ML) INTRAVENOUS ONCE
Status: COMPLETED | OUTPATIENT
Start: 2022-05-05 | End: 2022-05-05

## 2022-05-05 RX ORDER — LIDOCAINE HYDROCHLORIDE 10 MG/ML
INJECTION, SOLUTION EPIDURAL; INFILTRATION; INTRACAUDAL; PERINEURAL AS NEEDED
Status: DISCONTINUED | OUTPATIENT
Start: 2022-05-05 | End: 2022-05-05 | Stop reason: SURG

## 2022-05-05 RX ORDER — HYDROMORPHONE HYDROCHLORIDE 1 MG/ML
0.6 INJECTION, SOLUTION INTRAMUSCULAR; INTRAVENOUS; SUBCUTANEOUS EVERY 5 MIN PRN
Status: DISCONTINUED | OUTPATIENT
Start: 2022-05-05 | End: 2022-05-05

## 2022-05-05 RX ORDER — DEXAMETHASONE SODIUM PHOSPHATE 4 MG/ML
VIAL (ML) INJECTION AS NEEDED
Status: DISCONTINUED | OUTPATIENT
Start: 2022-05-05 | End: 2022-05-05 | Stop reason: SURG

## 2022-05-05 RX ORDER — DOCUSATE SODIUM 100 MG/1
100 CAPSULE, LIQUID FILLED ORAL 2 TIMES DAILY
Qty: 14 CAPSULE | Refills: 1 | Status: SHIPPED | OUTPATIENT
Start: 2022-05-05 | End: 2022-05-12

## 2022-05-05 RX ADMIN — ONDANSETRON 4 MG: 2 INJECTION INTRAMUSCULAR; INTRAVENOUS at 16:36:00

## 2022-05-05 RX ADMIN — CEFAZOLIN SODIUM/WATER 2 G: 2 G/20 ML SYRINGE (ML) INTRAVENOUS at 16:54:00

## 2022-05-05 RX ADMIN — KETOROLAC TROMETHAMINE 30 MG: 30 INJECTION, SOLUTION INTRAMUSCULAR; INTRAVENOUS at 17:57:00

## 2022-05-05 RX ADMIN — ROCURONIUM BROMIDE 50 MG: 10 INJECTION, SOLUTION INTRAVENOUS at 16:36:00

## 2022-05-05 RX ADMIN — GLYCOPYRROLATE 0.5 MG: 0.2 INJECTION, SOLUTION INTRAMUSCULAR; INTRAVENOUS at 17:49:00

## 2022-05-05 RX ADMIN — NEOSTIGMINE METHYLSULFATE 4 MG: 1 INJECTION INTRAVENOUS at 17:49:00

## 2022-05-05 RX ADMIN — SODIUM CHLORIDE, SODIUM LACTATE, POTASSIUM CHLORIDE, CALCIUM CHLORIDE: 600; 310; 30; 20 INJECTION, SOLUTION INTRAVENOUS at 18:18:00

## 2022-05-05 RX ADMIN — MIDAZOLAM HYDROCHLORIDE 2 MG: 1 INJECTION INTRAMUSCULAR; INTRAVENOUS at 16:35:00

## 2022-05-05 RX ADMIN — SODIUM CHLORIDE, SODIUM LACTATE, POTASSIUM CHLORIDE, CALCIUM CHLORIDE: 600; 310; 30; 20 INJECTION, SOLUTION INTRAVENOUS at 16:50:00

## 2022-05-05 RX ADMIN — LIDOCAINE HYDROCHLORIDE 50 MG: 10 INJECTION, SOLUTION EPIDURAL; INFILTRATION; INTRACAUDAL; PERINEURAL at 16:36:00

## 2022-05-05 RX ADMIN — DEXAMETHASONE SODIUM PHOSPHATE 8 MG: 4 MG/ML VIAL (ML) INJECTION at 16:36:00

## 2022-05-05 NOTE — ANESTHESIA PROCEDURE NOTES
Airway  Date/Time: 5/5/2022 4:46 PM  Urgency: Elective    Airway not difficult    General Information and Staff    Patient location during procedure: OR  Anesthesiologist: Long Mora MD  Resident/CRNA: Caitlin Cornelius CRNA  Performed: CRNA     Indications and Patient Condition  Indications for airway management: anesthesia  Sedation level: deep  Preoxygenated: yes  Patient position: sniffing  Mask difficulty assessment: 1 - vent by mask    Final Airway Details  Final airway type: endotracheal airway      Successful airway: ETT  Cuffed: yes   Successful intubation technique: direct laryngoscopy  Endotracheal tube insertion site: oral  Blade: [place]  Blade size: #3  ETT size (mm): 7.5    Cormack-Lehane Classification: grade I - full view of glottis  Placement verified by: chest auscultation and capnometry   Measured from: teeth  ETT to teeth (cm): 22  Number of attempts at approach: 1    Additional Comments  Atraumatic / bite block in place

## 2022-05-05 NOTE — ANESTHESIA PROCEDURE NOTES
Peripheral IV  Date/Time: 5/5/2022 4:49 PM  Inserted by: Augusto Pelaez CRNA    Placement  Needle size: 20 G  Laterality: left  Location: hand  Site prep: alcohol  Technique: anatomical landmarks  Attempts: 1

## 2022-05-05 NOTE — BRIEF OP NOTE
Pre-Operative Diagnosis: biliary dyskinesia, pain2     Post-Operative Diagnosis: The same     Procedure Performed:   robotic assisted cholecystectomy with cholangiogram,    Surgeon(s) and Role:     Cathy Starks MD - Primary    Assistant(s):   Flor Barnes CSA     Surgical Findings: Biliary dyskinesia     Specimen: Gallbladder     Estimated Blood Loss: 5 mL    Dictation Number:      Elizabeth Blankenship MD  5/5/2022  5:51 PM

## 2022-05-05 NOTE — OPERATIVE REPORT
[facility] OPERATING ROOM OPERATIVE REPORT:     PATIENT NAME: Heriberto Larry  : 1990   MRN: [id number]  SITE:  [facility] [place]:   2022     PREOPERATIVE DIAGNOSIS: Acalculous Cholecystitis, Biliary Dyskinesia      POSTOPERATIVE DIAGNOSIS: Acalculous Cholecystitis, Biliary Dyskinesia     PROCEDURE PERFORMED: Robotic cholecystectomy with intraoperative cholangiography and with immunofluorescence cholangiography     SURGEON: Michael Trent MD    ASST: [place], CSFA (Assistant helped position patient and helped with positioning, camera, retraction, suturing, closure, dressings etc.)    ANESTHESIA: General anesthesia     ESTIMATED BLOOD LOSS:   5 ml    COMPLICATIONS: none       INDICATIONS: This is a 33year old male who has evidence ofAcalculous cholecystitis due to Biliary Dyskinesia. I had a lengthy discussion with the patient as etiology of the above. I discussed treatment options of continued observation versus oral dissolution versus lithotripsy versus surgical management. Open versus laparoscopic versus robotic cholecystectomy were discussed in detail. Risk of the procedure including bleeding, infection, postoperative hematoma, wound infection, nonhealing wound, scar formation, perforated viscus, vascular injury, bile duct injury, bile leak, retained common bile duct stones, need for, duct exploration, need to convert to an open procedure, as well as risk of anesthesia including myocardial infarction, respiratory failure, renal failure, pulmonalis, DVT, and even death were all discussed in detail with the patient understands consents and wishes to proceed with the operation. Operation:  The patient was brought to the operating room and placed in the supine position. General anesthetic was administered via endotracheal tube by anesthesia.   . The patient's stomach was decompressed With an orogastric tube and the bladder was decompressed with Wells catheter. The abdomen was prepped and draped in routine sterile fashion. Local anesthetic was anesthetized and injected into the umbilical region and all port sites. A small incision was made in the umbilicus. A Veress needle was introduced into the abdominal cavity without difficulty. Using saline drop test the place was confirmed and pneumoperitoneum was established approximately 15 mmHg. Next, an 8 mm robotic Visiport trocar was inserted into the abdominal cavity of all 4 quadrants revealed no evidence of abdominal, bowel, or vascular injury present. 3 8 mm robotic trochars were placed, one in the left lateral mid abdomen and and 2 in the right mid abdomen region under direct localization without difficulty. The gallbladder was visualized and this was grasped with a grasper and retracted over the dome of the liver. ICG green was given preoperatively by nursing. Firefly was used with immunofluorescent cholangiography revealing the cystic duct and the common bile duct anatomy. Care was taken to avoid injury to the common bile duct. Calot's triangle was dissected free using blunt and sharp dissection. The cystic duct and cystic arteries were dissected free at the base of the gallbladder. A Hemoclip was placed around the cystic duct at the base of the gallbladder. A small incision was made in the cystic duct and transected the cholangiogram was performed. Cholangiogram revealed good flow into the intrahepatic ducts as well extrahepatic biliary system. There was good flow into the duodenum. There was no intraluminal filling defects present. this was confirmed with the radiologist intraoperatively . The cholangiocatheter was removed and 3 hemoclips were placed around the cystic duct with care to avoid tenting the cystic duct common duct junction. In a similar fashion, the cystic artery was then clipped with 2 hemoclips proximally and one distally and the base of the gallbladder.   The cystic duct and cystic artery were then divided with scissors. The gallbladder was removed from the liver bed using electrocautery. The gallbladder was then placed into the Endosac and brought out through the umbilicus without difficulty. Pneumoperitoneum was reestablished approxi-15 mmHg. Hemostasis of the liver bed was obtained using electrocautery. The right upper quadrant was irrigated and aspirated with copious amounts of saline solution. There was no active bleeding present. .  The fascia of the 8 mm robotic trocar extraction site was closed using an Endo Close under direct visualization with 0 Vicryl suture. The umbilical port was removed and the fascia was closed with no impingement of bowel or bleeding present. The remaining pneumoperitoneum was removed, and the 8 mm robotic trocars were removed under direct visualization, jwith no impingement of bowel or bleeding was present. The wounds were irrigated thoroughly with saline solution. The skin was approximated with 4-0 Vicryl running subcuticular suture. Mastisol and Steri-Strips were applied to the wound. Sterile dressings were applied to the wound. The patient was transferred off the operative table to recovery room extubated in stable condition. All counts were correct at the end of the case. The role of my assistant was critical to the operation. They acted in manipulation of the camera, retraction of the gallbladder as well as closure of the abdominal wounds.         [address] [facility]    5/5/2022  5:52 PM  Thomas Armenta MD

## 2022-05-16 PROBLEM — K82.8 BILIARY DYSKINESIA: Status: ACTIVE | Noted: 2022-04-22

## 2022-05-16 PROBLEM — K31.84 GASTROPARESIS: Status: ACTIVE | Noted: 2022-04-22

## 2022-06-13 RX ORDER — PREDNISONE 10 MG/1
TABLET ORAL
Qty: 60 TABLET | Refills: 2 | Status: SHIPPED | OUTPATIENT
Start: 2022-06-13

## 2022-06-13 NOTE — TELEPHONE ENCOUNTER
LOV: 3/10/2022  Future Appointments   Date Time Provider Rodrigo Cooper   7/5/2022 11:00 AM Taylor Atkinson MD 2014 [facility]     Labs:   Component      Latest Ref Rng & Units 4/14/2022   WBC      4.0 - 11.0 x10(3) uL 6.7   RBC      4.30 - 5.70 x10(6)uL 3.96 (L)   Hemoglobin      13.0 - 17.5 g/dL 11.2 (L)   Hematocrit      39.0 - 53.0 % 36.6 (L)   MCV      80.0 - 100.0 fL 92.4   MCH      26.0 - 34.0 pg 28.3   MCHC      31.0 - 37.0 g/dL 30.6 (L)   RDW-SD      35.1 - 46.3 fL 46.7 (H)   RDW      11.0 - 15.0 % 13.6   Platelet Count      837.7 - 450.0 10(3)uL 340.0   Prelim Neutrophil Abs      1.50 - 7.70 x10 (3) uL 3.82   Neutrophils Absolute      1.50 - 7.70 x10(3) uL 3.82   Lymphocytes Absolute      1.00 - 4.00 x10(3) uL 2.43   Monocytes Absolute      0.10 - 1.00 x10(3) uL 0.38   Eosinophils Absolute      0.00 - 0.70 x10(3) uL 0.01   Basophils Absolute      0.00 - 0.20 x10(3) uL 0.02   Immature Granulocyte Absolute      0.00 - 1.00 x10(3) uL 0.01   Neutrophils %      % 57.4   Lymphocytes %      % 36.4   Monocytes %      % 5.7   Eosinophils %      % 0.1   Basophils %      % 0.3   Immature Granulocyte %      % 0.1   Glucose      70 - 99 mg/dL 79   Sodium      136 - 145 mmol/L 142   Potassium      3.5 - 5.1 mmol/L 3.0 (L)   Chloride      98 - 112 mmol/L 107   Carbon Dioxide, Total      21.0 - 32.0 mmol/L 29.0   ANION GAP      0 - 18 mmol/L 6   BUN      7 - 18 mg/dL 2 (L)   CREATININE      0.70 - 1.30 mg/dL 0.55 (L)   BUN/CREATININE RATIO      10.0 - 20.0 3.6 (L)   CALCIUM      8.5 - 10.1 mg/dL 8.6   CALCULATED OSMOLALITY      275 - 295 mOsm/kg 289   eGFR NON-AFR.  AMERICAN      >=60 137   eGFR       >=60 158   ALT (SGPT)      16 - 61 U/L 7 (L)   AST (SGOT)      15 - 37 U/L 12 (L)   ALKALINE PHOSPHATASE      45 - 117 U/L 112   Total Bilirubin      0.1 - 2.0 mg/dL 0.2   PROTEIN, TOTAL      6.4 - 8.2 g/dL 6.4   Albumin      3.4 - 5.0 g/dL 2.7 (L)   Globulin      2.8 - 4.4 g/dL 3.7   A/G Ratio 1.0 - 2.0 0.7 (L)

## 2022-07-05 ENCOUNTER — TELEPHONE (OUTPATIENT)
Dept: RHEUMATOLOGY | Facility: CLINIC | Age: 32
End: 2022-07-05

## 2022-07-05 ENCOUNTER — OFFICE VISIT (OUTPATIENT)
Dept: RHEUMATOLOGY | Facility: CLINIC | Age: 32
End: 2022-07-05
Payer: COMMERCIAL

## 2022-07-05 ENCOUNTER — LAB ENCOUNTER (OUTPATIENT)
Dept: LAB | Facility: HOSPITAL | Age: 32
End: 2022-07-05
Attending: INTERNAL MEDICINE
Payer: COMMERCIAL

## 2022-07-05 VITALS
BODY MASS INDEX: 17.04 KG/M2 | HEART RATE: 101 BPM | HEIGHT: 70 IN | SYSTOLIC BLOOD PRESSURE: 100 MMHG | DIASTOLIC BLOOD PRESSURE: 69 MMHG | WEIGHT: 119 LBS

## 2022-07-05 DIAGNOSIS — M06.9 RHEUMATOID ARTHRITIS, INVOLVING UNSPECIFIED SITE, UNSPECIFIED WHETHER RHEUMATOID FACTOR PRESENT (HCC): Primary | ICD-10-CM

## 2022-07-05 DIAGNOSIS — R63.4 WEIGHT LOSS: ICD-10-CM

## 2022-07-05 DIAGNOSIS — S30.1XXS CONTUSION OF ABDOMINAL WALL, SEQUELA: ICD-10-CM

## 2022-07-05 DIAGNOSIS — M06.9 RHEUMATOID ARTHRITIS, INVOLVING UNSPECIFIED SITE, UNSPECIFIED WHETHER RHEUMATOID FACTOR PRESENT (HCC): ICD-10-CM

## 2022-07-05 DIAGNOSIS — Z51.81 THERAPEUTIC DRUG MONITORING: ICD-10-CM

## 2022-07-05 LAB
ALBUMIN SERPL-MCNC: 2.9 G/DL (ref 3.4–5)
ALT SERPL-CCNC: 11 U/L
AMYLASE SERPL-CCNC: 52 U/L (ref 25–115)
AST SERPL-CCNC: 16 U/L (ref 15–37)
CREAT BLD-MCNC: 0.66 MG/DL
CRP SERPL-MCNC: 6.2 MG/DL (ref ?–0.3)
DEPRECATED RDW RBC AUTO: 40.9 FL (ref 35.1–46.3)
ERYTHROCYTE [DISTWIDTH] IN BLOOD BY AUTOMATED COUNT: 12.3 % (ref 11–15)
ERYTHROCYTE [SEDIMENTATION RATE] IN BLOOD: 48 MM/HR
HCT VFR BLD AUTO: 37.4 %
HGB BLD-MCNC: 11.7 G/DL
LIPASE SERPL-CCNC: 76 U/L (ref 73–393)
MCH RBC QN AUTO: 28.2 PG (ref 26–34)
MCHC RBC AUTO-ENTMCNC: 31.3 G/DL (ref 31–37)
MCV RBC AUTO: 90.1 FL
PLATELET # BLD AUTO: 261 10(3)UL (ref 150–450)
RBC # BLD AUTO: 4.15 X10(6)UL
WBC # BLD AUTO: 4.9 X10(3) UL (ref 4–11)

## 2022-07-05 PROCEDURE — 82565 ASSAY OF CREATININE: CPT

## 2022-07-05 PROCEDURE — 3074F SYST BP LT 130 MM HG: CPT | Performed by: INTERNAL MEDICINE

## 2022-07-05 PROCEDURE — 3078F DIAST BP <80 MM HG: CPT | Performed by: INTERNAL MEDICINE

## 2022-07-05 PROCEDURE — 96372 THER/PROPH/DIAG INJ SC/IM: CPT | Performed by: INTERNAL MEDICINE

## 2022-07-05 PROCEDURE — 99214 OFFICE O/P EST MOD 30 MIN: CPT | Performed by: INTERNAL MEDICINE

## 2022-07-05 PROCEDURE — 85652 RBC SED RATE AUTOMATED: CPT

## 2022-07-05 PROCEDURE — 82150 ASSAY OF AMYLASE: CPT

## 2022-07-05 PROCEDURE — 84460 ALANINE AMINO (ALT) (SGPT): CPT

## 2022-07-05 PROCEDURE — 84450 TRANSFERASE (AST) (SGOT): CPT

## 2022-07-05 PROCEDURE — 83690 ASSAY OF LIPASE: CPT

## 2022-07-05 PROCEDURE — 36415 COLL VENOUS BLD VENIPUNCTURE: CPT

## 2022-07-05 PROCEDURE — 85027 COMPLETE CBC AUTOMATED: CPT

## 2022-07-05 PROCEDURE — 3008F BODY MASS INDEX DOCD: CPT | Performed by: INTERNAL MEDICINE

## 2022-07-05 PROCEDURE — 82040 ASSAY OF SERUM ALBUMIN: CPT

## 2022-07-05 PROCEDURE — 86140 C-REACTIVE PROTEIN: CPT

## 2022-07-05 RX ORDER — TRIAMCINOLONE ACETONIDE 40 MG/ML
40 INJECTION, SUSPENSION INTRA-ARTICULAR; INTRAMUSCULAR ONCE
Status: COMPLETED | OUTPATIENT
Start: 2022-07-05 | End: 2022-07-05

## 2022-07-05 RX ORDER — ABATACEPT 125 MG/ML
125 INJECTION, SOLUTION SUBCUTANEOUS
Qty: 4 EACH | Refills: 5 | Status: SHIPPED | OUTPATIENT
Start: 2022-07-05 | End: 2022-07-05

## 2022-07-05 RX ORDER — ABATACEPT 125 MG/ML
125 INJECTION, SOLUTION SUBCUTANEOUS
Qty: 4 EACH | Refills: 5 | Status: SHIPPED | OUTPATIENT
Start: 2022-07-05

## 2022-07-05 RX ADMIN — TRIAMCINOLONE ACETONIDE 40 MG: 40 INJECTION, SUSPENSION INTRA-ARTICULAR; INTRAMUSCULAR at 12:20:00

## 2022-07-05 NOTE — PATIENT INSTRUCTIONS
1 start  orencia 125mg a week   2. Return to clinic in  1 month. 3. Cont. Methotrexate 8 tablets a week   4. . Cont. Folic acid 1mg a day   5. Increase  Prednisone to 20mg a day -x 4 weeks, then taper to 10mg ad ay    6. Medrol santiago for any flare up   7. Check labs today   8. Follow up with nutritionist and gastroenterologist -   9.  Goal about 60gm of protein daily - and multivitamin

## 2022-07-05 NOTE — TELEPHONE ENCOUNTER
Education was provided for the patient today in office called to double check his specialty pharmacy. Rep stated it is [facility]. Resend script to corrected pharmacy. Provided patient with information.

## 2022-07-05 NOTE — PROGRESS NOTES
Patient receive today an IM TRIAMCINOLONE ACETONIDE 40 MG/ML injection given on Right Deltoid. Patient tolerated well with no complications.

## 2022-07-05 NOTE — PROGRESS NOTES
Patient provided education on Orencia today. Patient educated on proper handling/storage/disposal of medications. Patient informed of proper injection and aseptic technique. Patient educated on potential side effects. Educational materials given to patient on medication. Patient practiced several times with demo pen with great technique. Patient was unable to receive first injection in office today. Patient questions and concerns answered. Patient instructed to call us with any further questions.

## 2022-08-08 ENCOUNTER — OFFICE VISIT (OUTPATIENT)
Dept: RHEUMATOLOGY | Facility: CLINIC | Age: 32
End: 2022-08-08
Payer: COMMERCIAL

## 2022-08-08 VITALS
WEIGHT: 140.5 LBS | HEIGHT: 70 IN | HEART RATE: 87 BPM | SYSTOLIC BLOOD PRESSURE: 108 MMHG | RESPIRATION RATE: 16 BRPM | BODY MASS INDEX: 20.11 KG/M2 | DIASTOLIC BLOOD PRESSURE: 74 MMHG

## 2022-08-08 DIAGNOSIS — Z51.81 THERAPEUTIC DRUG MONITORING: ICD-10-CM

## 2022-08-08 DIAGNOSIS — R63.4 WEIGHT LOSS: ICD-10-CM

## 2022-08-08 DIAGNOSIS — M06.9 RHEUMATOID ARTHRITIS, INVOLVING UNSPECIFIED SITE, UNSPECIFIED WHETHER RHEUMATOID FACTOR PRESENT (HCC): Primary | ICD-10-CM

## 2022-08-08 PROCEDURE — 99214 OFFICE O/P EST MOD 30 MIN: CPT | Performed by: INTERNAL MEDICINE

## 2022-08-08 PROCEDURE — 3078F DIAST BP <80 MM HG: CPT | Performed by: INTERNAL MEDICINE

## 2022-08-08 PROCEDURE — 3074F SYST BP LT 130 MM HG: CPT | Performed by: INTERNAL MEDICINE

## 2022-08-08 PROCEDURE — 3008F BODY MASS INDEX DOCD: CPT | Performed by: INTERNAL MEDICINE

## 2022-08-08 RX ORDER — PREDNISONE 10 MG/1
20 TABLET ORAL DAILY
Qty: 60 TABLET | Refills: 3 | Status: SHIPPED | OUTPATIENT
Start: 2022-08-08

## 2022-08-08 NOTE — PATIENT INSTRUCTIONS
1  Cont. orencia 125mg a week   2. Cont. Methotrexate 8 tablets a week   3. .Cont. Folic acid 1mg a day   4. Cont. Prednisone  20mg a day  5. Return to clinic in 2months. 6. Check labs today - standing orders -   7. meloxicam 15mg a day   8. norco as needed   .  Goal about 60gm of protein daily - and multivitamin

## 2022-08-29 ENCOUNTER — TELEPHONE (OUTPATIENT)
Dept: RHEUMATOLOGY | Facility: CLINIC | Age: 32
End: 2022-08-29

## 2022-09-07 NOTE — TELEPHONE ENCOUNTER
Case ID # [id number]  PA approved for [facility]  07/31/2022 - 03/04/2023    Script last sent on 7/5/22

## 2022-10-10 ENCOUNTER — LAB ENCOUNTER (OUTPATIENT)
Dept: LAB | Age: 32
End: 2022-10-10
Attending: INTERNAL MEDICINE
Payer: COMMERCIAL

## 2022-10-10 ENCOUNTER — OFFICE VISIT (OUTPATIENT)
Dept: RHEUMATOLOGY | Facility: CLINIC | Age: 32
End: 2022-10-10
Payer: COMMERCIAL

## 2022-10-10 VITALS
HEART RATE: 70 BPM | RESPIRATION RATE: 16 BRPM | WEIGHT: 158 LBS | DIASTOLIC BLOOD PRESSURE: 69 MMHG | HEIGHT: 70 IN | BODY MASS INDEX: 22.62 KG/M2 | SYSTOLIC BLOOD PRESSURE: 105 MMHG

## 2022-10-10 DIAGNOSIS — M06.9 RHEUMATOID ARTHRITIS, INVOLVING UNSPECIFIED SITE, UNSPECIFIED WHETHER RHEUMATOID FACTOR PRESENT (HCC): Primary | ICD-10-CM

## 2022-10-10 DIAGNOSIS — Z51.81 THERAPEUTIC DRUG MONITORING: ICD-10-CM

## 2022-10-10 DIAGNOSIS — M06.9 RHEUMATOID ARTHRITIS, INVOLVING UNSPECIFIED SITE, UNSPECIFIED WHETHER RHEUMATOID FACTOR PRESENT (HCC): ICD-10-CM

## 2022-10-10 DIAGNOSIS — R63.4 WEIGHT LOSS: ICD-10-CM

## 2022-10-10 LAB
ALBUMIN SERPL-MCNC: 3.7 G/DL (ref 3.4–5)
ALBUMIN/GLOB SERPL: 0.9 {RATIO} (ref 1–2)
ALP LIVER SERPL-CCNC: 119 U/L
ALT SERPL-CCNC: 41 U/L
ANION GAP SERPL CALC-SCNC: 4 MMOL/L (ref 0–18)
AST SERPL-CCNC: 23 U/L (ref 15–37)
BILIRUB SERPL-MCNC: 0.4 MG/DL (ref 0.1–2)
BUN BLD-MCNC: 11 MG/DL (ref 7–18)
BUN/CREAT SERPL: 13.6 (ref 10–20)
CALCIUM BLD-MCNC: 9.9 MG/DL (ref 8.5–10.1)
CHLORIDE SERPL-SCNC: 105 MMOL/L (ref 98–112)
CO2 SERPL-SCNC: 29 MMOL/L (ref 21–32)
CREAT BLD-MCNC: 0.81 MG/DL
CRP SERPL-MCNC: 5.1 MG/DL (ref ?–0.3)
DEPRECATED RDW RBC AUTO: 51.8 FL (ref 35.1–46.3)
ERYTHROCYTE [DISTWIDTH] IN BLOOD BY AUTOMATED COUNT: 14.4 % (ref 11–15)
ERYTHROCYTE [SEDIMENTATION RATE] IN BLOOD: 71 MM/HR
FASTING STATUS PATIENT QL REPORTED: YES
GFR SERPLBLD BASED ON 1.73 SQ M-ARVRAT: 119 ML/MIN/1.73M2 (ref 60–?)
GLOBULIN PLAS-MCNC: 4.3 G/DL (ref 2.8–4.4)
GLUCOSE BLD-MCNC: 88 MG/DL (ref 70–99)
HCT VFR BLD AUTO: 45.2 %
HGB BLD-MCNC: 14.9 G/DL
MCH RBC QN AUTO: 32.2 PG (ref 26–34)
MCHC RBC AUTO-ENTMCNC: 33 G/DL (ref 31–37)
MCV RBC AUTO: 97.6 FL
OSMOLALITY SERPL CALC.SUM OF ELEC: 285 MOSM/KG (ref 275–295)
PLATELET # BLD AUTO: 234 10(3)UL (ref 150–450)
POTASSIUM SERPL-SCNC: 4.1 MMOL/L (ref 3.5–5.1)
PROT SERPL-MCNC: 8 G/DL (ref 6.4–8.2)
RBC # BLD AUTO: 4.63 X10(6)UL
SODIUM SERPL-SCNC: 138 MMOL/L (ref 136–145)
WBC # BLD AUTO: 7.9 X10(3) UL (ref 4–11)

## 2022-10-10 PROCEDURE — 99214 OFFICE O/P EST MOD 30 MIN: CPT | Performed by: INTERNAL MEDICINE

## 2022-10-10 PROCEDURE — 3008F BODY MASS INDEX DOCD: CPT | Performed by: INTERNAL MEDICINE

## 2022-10-10 PROCEDURE — 80053 COMPREHEN METABOLIC PANEL: CPT | Performed by: INTERNAL MEDICINE

## 2022-10-10 PROCEDURE — 36415 COLL VENOUS BLD VENIPUNCTURE: CPT

## 2022-10-10 PROCEDURE — 3078F DIAST BP <80 MM HG: CPT | Performed by: INTERNAL MEDICINE

## 2022-10-10 PROCEDURE — 85652 RBC SED RATE AUTOMATED: CPT | Performed by: INTERNAL MEDICINE

## 2022-10-10 PROCEDURE — 3074F SYST BP LT 130 MM HG: CPT | Performed by: INTERNAL MEDICINE

## 2022-10-10 PROCEDURE — 85027 COMPLETE CBC AUTOMATED: CPT | Performed by: INTERNAL MEDICINE

## 2022-10-10 PROCEDURE — 86480 TB TEST CELL IMMUN MEASURE: CPT

## 2022-10-10 PROCEDURE — 86140 C-REACTIVE PROTEIN: CPT | Performed by: INTERNAL MEDICINE

## 2022-10-10 RX ORDER — PREDNISONE 10 MG/1
20 TABLET ORAL DAILY
Qty: 60 TABLET | Refills: 3 | Status: SHIPPED | OUTPATIENT
Start: 2022-10-10

## 2022-10-10 RX ORDER — SULFASALAZINE 500 MG/1
500 TABLET ORAL 2 TIMES DAILY
Qty: 60 TABLET | Refills: 1 | Status: SHIPPED | OUTPATIENT
Start: 2022-10-10

## 2022-10-10 RX ORDER — MELOXICAM 15 MG/1
15 TABLET ORAL DAILY
Qty: 90 TABLET | Refills: 1 | Status: SHIPPED | OUTPATIENT
Start: 2022-10-10

## 2022-10-12 LAB
M TB IFN-G CD4+ T-CELLS BLD-ACNC: 0.02 IU/ML
M TB TUBERC IFN-G BLD QL: NEGATIVE
M TB TUBERC IGNF/MITOGEN IGNF CONTROL: >10 IU/ML
QFT TB1 AG MINUS NIL: 0 IU/ML
QFT TB2 AG MINUS NIL: -0.01 IU/ML

## 2022-11-28 RX ORDER — ABATACEPT 125 MG/ML
125 INJECTION, SOLUTION SUBCUTANEOUS
Qty: 4 EACH | Refills: 5 | Status: SHIPPED | OUTPATIENT
Start: 2022-11-28

## 2022-12-01 ENCOUNTER — TELEPHONE (OUTPATIENT)
Dept: SURGERY | Facility: CLINIC | Age: 32
End: 2022-12-01

## 2022-12-05 RX ORDER — SULFASALAZINE 500 MG/1
500 TABLET ORAL 2 TIMES DAILY
Qty: 60 TABLET | Refills: 1 | Status: SHIPPED | OUTPATIENT
Start: 2022-12-05

## 2022-12-12 ENCOUNTER — TELEMEDICINE (OUTPATIENT)
Dept: RHEUMATOLOGY | Facility: CLINIC | Age: 32
End: 2022-12-12

## 2022-12-12 DIAGNOSIS — Z51.81 THERAPEUTIC DRUG MONITORING: ICD-10-CM

## 2022-12-12 DIAGNOSIS — M06.9 RHEUMATOID ARTHRITIS, INVOLVING UNSPECIFIED SITE, UNSPECIFIED WHETHER RHEUMATOID FACTOR PRESENT (HCC): Primary | ICD-10-CM

## 2022-12-12 RX ORDER — PREDNISONE 1 MG/1
10 TABLET ORAL DAILY
Qty: 60 TABLET | Refills: 0 | Status: SHIPPED | OUTPATIENT
Start: 2022-12-12

## 2022-12-12 RX ORDER — PREDNISONE 10 MG/1
30 TABLET ORAL DAILY
Qty: 90 TABLET | Refills: 3 | Status: SHIPPED | OUTPATIENT
Start: 2022-12-12

## 2022-12-12 NOTE — PATIENT INSTRUCTIONS
1  Cont. orencia 125mg a week   2. Cont. Methotrexate 8 tablets a week   3. .Cont. Folic acid 1mg a day   4. Increase . Prednisone  To 30mg a day -   5. Cont. sulfasalazine 500mg twice a day   6. Return to clinic in 2months. 7. meloxicam 15mg a day   8. norco as needed   9. Plan for rinvoq or actemra - will wait til January after your MRI -   10.  Make appointment in Feb 2023

## 2023-01-19 ENCOUNTER — TELEPHONE (OUTPATIENT)
Dept: SURGERY | Facility: CLINIC | Age: 33
End: 2023-01-19

## 2023-01-19 DIAGNOSIS — H49.22 SIXTH NERVE PALSY, LEFT: ICD-10-CM

## 2023-01-19 DIAGNOSIS — G93.0 EPIDERMOID CYST OF BRAIN: Primary | ICD-10-CM

## 2023-01-19 DIAGNOSIS — Z98.890 HX OF CRANIOTOMY: ICD-10-CM

## 2023-01-19 NOTE — TELEPHONE ENCOUNTER
Patient needs new order for MRI Brain since current one is . Patient is due 2023.        Order pending for approval if correct

## 2023-01-20 NOTE — TELEPHONE ENCOUNTER
Noted the providers feedback listed below     Called patient to inform, pt acknowledged. Central scheduling number provided, pt acknowledged .      Nothing further needed for this call

## 2023-01-27 ENCOUNTER — HOSPITAL ENCOUNTER (OUTPATIENT)
Dept: MRI IMAGING | Age: 33
Discharge: HOME | End: 2023-01-27
Attending: PHYSICIAN ASSISTANT
Payer: COMMERCIAL

## 2023-01-27 DIAGNOSIS — G93.0 EPIDERMOID CYST OF BRAIN: ICD-10-CM

## 2023-01-27 DIAGNOSIS — Z98.890 HX OF CRANIOTOMY: ICD-10-CM

## 2023-01-27 PROCEDURE — 70553 MRI BRAIN STEM W/O & W/DYE: CPT | Performed by: PHYSICIAN ASSISTANT

## 2023-01-27 PROCEDURE — A9575 INJ GADOTERATE MEGLUMI 0.1ML: HCPCS | Performed by: PHYSICIAN ASSISTANT

## 2023-01-27 RX ORDER — GADOTERATE MEGLUMINE 376.9 MG/ML
20 INJECTION INTRAVENOUS
Status: COMPLETED | OUTPATIENT
Start: 2023-01-27 | End: 2023-01-27

## 2023-01-27 RX ADMIN — GADOTERATE MEGLUMINE 16 ML: 376.9 INJECTION INTRAVENOUS at 07:55:00

## 2023-01-30 ENCOUNTER — OFFICE VISIT (OUTPATIENT)
Dept: SURGERY | Facility: CLINIC | Age: 33
End: 2023-01-30
Payer: COMMERCIAL

## 2023-01-30 VITALS
WEIGHT: 180 LBS | DIASTOLIC BLOOD PRESSURE: 80 MMHG | HEART RATE: 69 BPM | SYSTOLIC BLOOD PRESSURE: 120 MMHG | HEIGHT: 70 IN | BODY MASS INDEX: 25.77 KG/M2

## 2023-01-30 DIAGNOSIS — Z98.890 HX OF CRANIOTOMY: ICD-10-CM

## 2023-01-30 DIAGNOSIS — H49.12 4TH NERVE PALSY, LEFT: ICD-10-CM

## 2023-01-30 DIAGNOSIS — G93.0 EPIDERMOID CYST OF BRAIN: Primary | ICD-10-CM

## 2023-01-30 PROCEDURE — 3074F SYST BP LT 130 MM HG: CPT | Performed by: STUDENT IN AN ORGANIZED HEALTH CARE EDUCATION/TRAINING PROGRAM

## 2023-01-30 PROCEDURE — 3008F BODY MASS INDEX DOCD: CPT | Performed by: STUDENT IN AN ORGANIZED HEALTH CARE EDUCATION/TRAINING PROGRAM

## 2023-01-30 PROCEDURE — 99214 OFFICE O/P EST MOD 30 MIN: CPT | Performed by: STUDENT IN AN ORGANIZED HEALTH CARE EDUCATION/TRAINING PROGRAM

## 2023-01-30 PROCEDURE — 3079F DIAST BP 80-89 MM HG: CPT | Performed by: STUDENT IN AN ORGANIZED HEALTH CARE EDUCATION/TRAINING PROGRAM

## 2023-01-30 NOTE — PROGRESS NOTES
Pt here for follow up on imaging. Former pt of Dr Joe Arroyo. Pt states he has joint pain due to arthritis.

## 2023-02-01 ENCOUNTER — LAB ENCOUNTER (OUTPATIENT)
Dept: LAB | Age: 33
End: 2023-02-01
Attending: INTERNAL MEDICINE
Payer: COMMERCIAL

## 2023-02-01 ENCOUNTER — TELEPHONE (OUTPATIENT)
Dept: RHEUMATOLOGY | Facility: CLINIC | Age: 33
End: 2023-02-01

## 2023-02-01 ENCOUNTER — OFFICE VISIT (OUTPATIENT)
Dept: RHEUMATOLOGY | Facility: CLINIC | Age: 33
End: 2023-02-01

## 2023-02-01 VITALS
HEIGHT: 70 IN | RESPIRATION RATE: 16 BRPM | HEART RATE: 75 BPM | BODY MASS INDEX: 25.77 KG/M2 | DIASTOLIC BLOOD PRESSURE: 74 MMHG | WEIGHT: 180 LBS | SYSTOLIC BLOOD PRESSURE: 125 MMHG

## 2023-02-01 DIAGNOSIS — M06.9 RHEUMATOID ARTHRITIS, INVOLVING UNSPECIFIED SITE, UNSPECIFIED WHETHER RHEUMATOID FACTOR PRESENT (HCC): ICD-10-CM

## 2023-02-01 DIAGNOSIS — G89.29 CHRONIC LEFT SHOULDER PAIN: ICD-10-CM

## 2023-02-01 DIAGNOSIS — M25.512 CHRONIC LEFT SHOULDER PAIN: ICD-10-CM

## 2023-02-01 DIAGNOSIS — Z51.81 THERAPEUTIC DRUG MONITORING: ICD-10-CM

## 2023-02-01 DIAGNOSIS — Z13.220 SCREENING CHOLESTEROL LEVEL: ICD-10-CM

## 2023-02-01 DIAGNOSIS — M06.9 RHEUMATOID ARTHRITIS, INVOLVING UNSPECIFIED SITE, UNSPECIFIED WHETHER RHEUMATOID FACTOR PRESENT (HCC): Primary | ICD-10-CM

## 2023-02-01 LAB
CHOLEST SERPL-MCNC: 180 MG/DL (ref ?–200)
FASTING PATIENT LIPID ANSWER: NO
HDLC SERPL-MCNC: 102 MG/DL (ref 40–59)
LDLC SERPL CALC-MCNC: 69 MG/DL (ref ?–100)
NONHDLC SERPL-MCNC: 78 MG/DL (ref ?–130)
TRIGL SERPL-MCNC: 44 MG/DL (ref 30–149)
VLDLC SERPL CALC-MCNC: 7 MG/DL (ref 0–30)

## 2023-02-01 PROCEDURE — 3074F SYST BP LT 130 MM HG: CPT | Performed by: INTERNAL MEDICINE

## 2023-02-01 PROCEDURE — 3008F BODY MASS INDEX DOCD: CPT | Performed by: INTERNAL MEDICINE

## 2023-02-01 PROCEDURE — 36415 COLL VENOUS BLD VENIPUNCTURE: CPT

## 2023-02-01 PROCEDURE — 99214 OFFICE O/P EST MOD 30 MIN: CPT | Performed by: INTERNAL MEDICINE

## 2023-02-01 PROCEDURE — 3078F DIAST BP <80 MM HG: CPT | Performed by: INTERNAL MEDICINE

## 2023-02-01 PROCEDURE — 80061 LIPID PANEL: CPT

## 2023-02-01 PROCEDURE — 20610 DRAIN/INJ JOINT/BURSA W/O US: CPT | Performed by: INTERNAL MEDICINE

## 2023-02-01 RX ORDER — TRIAMCINOLONE ACETONIDE 40 MG/ML
40 INJECTION, SUSPENSION INTRA-ARTICULAR; INTRAMUSCULAR ONCE
Status: COMPLETED | OUTPATIENT
Start: 2023-02-01 | End: 2023-02-01

## 2023-02-01 RX ADMIN — TRIAMCINOLONE ACETONIDE 40 MG: 40 INJECTION, SUSPENSION INTRA-ARTICULAR; INTRAMUSCULAR at 12:00:00

## 2023-02-01 NOTE — PATIENT INSTRUCTIONS
Summary:  1  Stop . orencia 125mg a week   Plan to switch to rinvoq 15mg a day   2. Increase Methotrexate 9 tablets a week   3. .Cont. Folic acid 1mg a day   4. Cont. Prednisone  Try to taper to 20mg a day   5. Cont. sulfasalazine 500mg twice a day   6. Return to clinic in 2months. 7. meloxicam 15mg a day   8. norco as needed   9.  Check labs today -  10 rest left shoulder x 3 days

## 2023-02-01 NOTE — PROCEDURES
- With paitent's consent, I injected pt's left shoulder  with 1ml lidocaine 1 % and 1 ml kenalog 40. It was done under sterile technique using iodine and alcohol swabs and ethyl chloride was used as an anaesthetic spray. Pt.  tolerated it well.

## 2023-02-07 RX ORDER — UPADACITINIB 15 MG/1
15 TABLET, EXTENDED RELEASE ORAL DAILY
Qty: 30 TABLET | Refills: 5 | Status: SHIPPED | OUTPATIENT
Start: 2023-02-07

## 2023-02-07 NOTE — TELEPHONE ENCOUNTER
Prior authorization for: rinvoq    Medication form: 15mg    Submission method: maiar    Spoke with (if by phone):     Date submitted: 2/7/23    Tracking #:

## 2023-02-07 NOTE — TELEPHONE ENCOUNTER
Script pended.         Approved    Prior authorization for: Rinvoq     Medication form: 15mg    Date received: 2/7/23    Approval #: [id number]    Approved dates: 1/8/23 - 8/6/23    Pharmacy for medication: [facility]

## 2023-02-10 NOTE — TELEPHONE ENCOUNTER
Spoke to patient and informed of approval. Informed script sent to specialty pharmacy that will be in contact with him. Patient verbalized understanding and had no further questions at this time.

## 2023-04-11 ENCOUNTER — TELEPHONE (OUTPATIENT)
Dept: RHEUMATOLOGY | Facility: CLINIC | Age: 33
End: 2023-04-11

## 2023-04-11 ENCOUNTER — LAB ENCOUNTER (OUTPATIENT)
Dept: LAB | Facility: HOSPITAL | Age: 33
End: 2023-04-11
Attending: INTERNAL MEDICINE
Payer: COMMERCIAL

## 2023-04-11 ENCOUNTER — OFFICE VISIT (OUTPATIENT)
Dept: RHEUMATOLOGY | Facility: CLINIC | Age: 33
End: 2023-04-11

## 2023-04-11 VITALS
WEIGHT: 189.88 LBS | BODY MASS INDEX: 27.18 KG/M2 | HEART RATE: 77 BPM | RESPIRATION RATE: 16 BRPM | SYSTOLIC BLOOD PRESSURE: 108 MMHG | DIASTOLIC BLOOD PRESSURE: 72 MMHG | HEIGHT: 70 IN

## 2023-04-11 DIAGNOSIS — Z51.81 THERAPEUTIC DRUG MONITORING: ICD-10-CM

## 2023-04-11 DIAGNOSIS — M06.9 RHEUMATOID ARTHRITIS, INVOLVING UNSPECIFIED SITE, UNSPECIFIED WHETHER RHEUMATOID FACTOR PRESENT (HCC): Primary | ICD-10-CM

## 2023-04-11 DIAGNOSIS — R63.4 WEIGHT LOSS: ICD-10-CM

## 2023-04-11 DIAGNOSIS — M06.9 RHEUMATOID ARTHRITIS, INVOLVING UNSPECIFIED SITE, UNSPECIFIED WHETHER RHEUMATOID FACTOR PRESENT (HCC): ICD-10-CM

## 2023-04-11 LAB
ALBUMIN SERPL-MCNC: 3.8 G/DL (ref 3.4–5)
ALT SERPL-CCNC: 34 U/L
AST SERPL-CCNC: 20 U/L (ref 15–37)
CREAT BLD-MCNC: 0.83 MG/DL
CRP SERPL-MCNC: 2.6 MG/DL (ref ?–0.3)
DEPRECATED RDW RBC AUTO: 46.6 FL (ref 35.1–46.3)
ERYTHROCYTE [DISTWIDTH] IN BLOOD BY AUTOMATED COUNT: 12.7 % (ref 11–15)
ERYTHROCYTE [SEDIMENTATION RATE] IN BLOOD: 20 MM/HR
GFR SERPLBLD BASED ON 1.73 SQ M-ARVRAT: 118 ML/MIN/1.73M2 (ref 60–?)
HCT VFR BLD AUTO: 46.5 %
HGB BLD-MCNC: 15.2 G/DL
MCH RBC QN AUTO: 32.8 PG (ref 26–34)
MCHC RBC AUTO-ENTMCNC: 32.7 G/DL (ref 31–37)
MCV RBC AUTO: 100.2 FL
PLATELET # BLD AUTO: 201 10(3)UL (ref 150–450)
RBC # BLD AUTO: 4.64 X10(6)UL
WBC # BLD AUTO: 11.8 X10(3) UL (ref 4–11)

## 2023-04-11 PROCEDURE — 99214 OFFICE O/P EST MOD 30 MIN: CPT | Performed by: INTERNAL MEDICINE

## 2023-04-11 PROCEDURE — 3008F BODY MASS INDEX DOCD: CPT | Performed by: INTERNAL MEDICINE

## 2023-04-11 PROCEDURE — 82565 ASSAY OF CREATININE: CPT

## 2023-04-11 PROCEDURE — 36415 COLL VENOUS BLD VENIPUNCTURE: CPT

## 2023-04-11 PROCEDURE — 86140 C-REACTIVE PROTEIN: CPT

## 2023-04-11 PROCEDURE — 85027 COMPLETE CBC AUTOMATED: CPT

## 2023-04-11 PROCEDURE — 84460 ALANINE AMINO (ALT) (SGPT): CPT

## 2023-04-11 PROCEDURE — 82040 ASSAY OF SERUM ALBUMIN: CPT

## 2023-04-11 PROCEDURE — 3078F DIAST BP <80 MM HG: CPT | Performed by: INTERNAL MEDICINE

## 2023-04-11 PROCEDURE — 84450 TRANSFERASE (AST) (SGOT): CPT

## 2023-04-11 PROCEDURE — 85652 RBC SED RATE AUTOMATED: CPT

## 2023-04-11 PROCEDURE — 3074F SYST BP LT 130 MM HG: CPT | Performed by: INTERNAL MEDICINE

## 2023-04-11 NOTE — PATIENT INSTRUCTIONS
1   plan to start rinvoq 15mg a day   2. Cont. Methotrexate 9 tablets a week   3. .Cont. Folic acid 1mg a day   4. Cont. Prednisone  20mg a day   5. Cont. sulfasalazine 500mg twice a day   6. Return to clinic in 2months. 7. meloxicam 15mg a day   8. norco as needed   9.  Check labs today -

## 2023-04-11 NOTE — TELEPHONE ENCOUNTER
Can you check with insurance about coverage with the rinvoq savings card for rene. They are saying he owes $3000 . He states the insurance  did not run  the rinvoq card. He gave them the number before they shipped it to him. Pt. Has the savings card . He has a shipment of the rinvoq at home. But he has not started it. So he has not started rinvoq and is not on orencia either. He called insurance and states that they don't have the assitance card. He called rinvoq and they state insurance is not running the card. He needs help figuring it out.

## 2023-04-14 NOTE — TELEPHONE ENCOUNTER
Ever may have not ran card for Rinvoq. Martine Plaza spoke with [facility] on 4/6 to let the know pt does have Rinvoq savings card on file. Field Cesar Cai to investigate and call office back.

## 2023-04-14 NOTE — TELEPHONE ENCOUNTER
Per [employer] in March $2742 was take off of card and $0-co-pay. Pt contacted to advise of this. He stated she still has a co-pay of $2242; only $500 was taken off. Pt stated he spoke to [facility] a couple of day ago and he still has a co-pay. Pt expressed his frustration.

## 2023-05-26 ENCOUNTER — TELEPHONE (OUTPATIENT)
Dept: SURGERY | Facility: CLINIC | Age: 33
End: 2023-05-26

## 2023-05-26 NOTE — TELEPHONE ENCOUNTER
[facility] request for medical records;From [id number] [place] [address]; Requesting records from 10/8/20 to present;Sent to scan stat

## 2023-06-12 ENCOUNTER — TELEPHONE (OUTPATIENT)
Dept: RHEUMATOLOGY | Facility: CLINIC | Age: 33
End: 2023-06-12

## 2023-06-12 ENCOUNTER — OFFICE VISIT (OUTPATIENT)
Dept: RHEUMATOLOGY | Facility: CLINIC | Age: 33
End: 2023-06-12

## 2023-06-12 VITALS
BODY MASS INDEX: 28.89 KG/M2 | WEIGHT: 201.81 LBS | RESPIRATION RATE: 16 BRPM | SYSTOLIC BLOOD PRESSURE: 111 MMHG | HEIGHT: 70 IN | DIASTOLIC BLOOD PRESSURE: 72 MMHG | HEART RATE: 73 BPM

## 2023-06-12 DIAGNOSIS — M06.9 RHEUMATOID ARTHRITIS, INVOLVING UNSPECIFIED SITE, UNSPECIFIED WHETHER RHEUMATOID FACTOR PRESENT (HCC): Primary | ICD-10-CM

## 2023-06-12 DIAGNOSIS — Z51.81 THERAPEUTIC DRUG MONITORING: ICD-10-CM

## 2023-06-12 PROCEDURE — 3078F DIAST BP <80 MM HG: CPT | Performed by: INTERNAL MEDICINE

## 2023-06-12 PROCEDURE — 99214 OFFICE O/P EST MOD 30 MIN: CPT | Performed by: INTERNAL MEDICINE

## 2023-06-12 PROCEDURE — 3074F SYST BP LT 130 MM HG: CPT | Performed by: INTERNAL MEDICINE

## 2023-06-12 PROCEDURE — 3008F BODY MASS INDEX DOCD: CPT | Performed by: INTERNAL MEDICINE

## 2023-06-12 RX ORDER — MELOXICAM 15 MG/1
15 TABLET ORAL DAILY
Qty: 90 TABLET | Refills: 1 | Status: SHIPPED | OUTPATIENT
Start: 2023-06-12

## 2023-06-12 RX ORDER — FOLIC ACID 1 MG/1
1 TABLET ORAL
Qty: 90 TABLET | Refills: 3 | Status: SHIPPED | OUTPATIENT
Start: 2023-06-12

## 2023-06-12 RX ORDER — PREDNISONE 10 MG/1
20 TABLET ORAL DAILY
Qty: 180 TABLET | Refills: 1 | Status: SHIPPED | OUTPATIENT
Start: 2023-06-12

## 2023-06-12 RX ORDER — SULFASALAZINE 500 MG/1
500 TABLET ORAL 2 TIMES DAILY
Qty: 180 TABLET | Refills: 1 | Status: SHIPPED | OUTPATIENT
Start: 2023-06-12

## 2023-06-12 NOTE — TELEPHONE ENCOUNTER
Plan to not use rinvoq due to coverage issues and start actemra - 125mg every 2 weeks.    Will need PA -

## 2023-06-12 NOTE — PATIENT INSTRUCTIONS
1   Try actemra 162mg every week , since weight based , then can try for weekly  2. Cont. Methotrexate 9 tablets a week   3. .Cont. Folic acid 1mg a day   4. Cont. Prednisone  20mg a day   5. Cont. sulfasalazine 500mg twice a day   6. Return to clinic in 1-2months. 7. meloxicam 15mg a day   8. norco as needed   9.  Check labs today -

## 2023-06-13 NOTE — TELEPHONE ENCOUNTER
PA start    Prior authorization for: Actemra 162 mg     Medication form: pen weekly     Submission method: Surescrialyssa    Spoke with (if by phone):     Date submitted: 6/13/23    Tracking #:    QF-TB result:  Component      Latest Ref Rng 10/10/2022   Quantiferon TB NIL      IU/mL 0.02    Quantiferon-TB1 Minus NIL      IU/mL 0.00    Quantiferon-TB2 Minus NIL      IU/mL -0.01    Quantiferon TB Mitogen minus NIL      IU/mL >10.00    Quantiferon TB Result      Negative  Negative

## 2023-06-16 RX ORDER — TOCILIZUMAB 180 MG/ML
162 INJECTION, SOLUTION SUBCUTANEOUS
Qty: 4 EACH | Refills: 5 | Status: SHIPPED | OUTPATIENT
Start: 2023-06-16

## 2023-06-16 NOTE — TELEPHONE ENCOUNTER
Script pended. Will need to call pt to sign up for co-pay card, teaching? [id number]; Review Type:Prior Auth; Coverage Start Date:05/14/2023; Coverage End Date:12/10/2023;    PA Approved    Prior authorization for: Actemra    Medication form: 162 mg pen weekly    Date received: 5/14/23    Approval #: [id number]    Approved dates: Date:05/14/2023; Coverage End Date:12/10/2023    Pharmacy for medication:    QF-TB results:    Component      Latest Ref Rng 10/10/2022   Quantiferon TB NIL      IU/mL 0.02    Quantiferon-TB1 Minus NIL      IU/mL 0.00    Quantiferon-TB2 Minus NIL      IU/mL -0.01    Quantiferon TB Mitogen minus NIL      IU/mL >10.00    Quantiferon TB Result      Negative  Negative

## 2023-07-17 ENCOUNTER — HOSPITAL ENCOUNTER (OUTPATIENT)
Dept: MRI IMAGING | Age: 33
Discharge: HOME | End: 2023-07-17
Attending: STUDENT IN AN ORGANIZED HEALTH CARE EDUCATION/TRAINING PROGRAM
Payer: COMMERCIAL

## 2023-07-17 DIAGNOSIS — H49.12 4TH NERVE PALSY, LEFT: ICD-10-CM

## 2023-07-17 DIAGNOSIS — G93.0 EPIDERMOID CYST OF BRAIN: ICD-10-CM

## 2023-07-17 DIAGNOSIS — Z98.890 HX OF CRANIOTOMY: ICD-10-CM

## 2023-07-17 PROCEDURE — A9575 INJ GADOTERATE MEGLUMI 0.1ML: HCPCS | Performed by: STUDENT IN AN ORGANIZED HEALTH CARE EDUCATION/TRAINING PROGRAM

## 2023-07-17 PROCEDURE — 70553 MRI BRAIN STEM W/O & W/DYE: CPT | Performed by: STUDENT IN AN ORGANIZED HEALTH CARE EDUCATION/TRAINING PROGRAM

## 2023-07-17 RX ORDER — GADOTERATE MEGLUMINE 376.9 MG/ML
20 INJECTION INTRAVENOUS
Status: COMPLETED | OUTPATIENT
Start: 2023-07-17 | End: 2023-07-17

## 2023-07-17 RX ADMIN — GADOTERATE MEGLUMINE 19 ML: 376.9 INJECTION INTRAVENOUS at 11:47:00

## 2023-07-24 ENCOUNTER — OFFICE VISIT (OUTPATIENT)
Dept: SURGERY | Facility: CLINIC | Age: 33
End: 2023-07-24
Payer: COMMERCIAL

## 2023-07-24 VITALS
HEIGHT: 70 IN | DIASTOLIC BLOOD PRESSURE: 70 MMHG | BODY MASS INDEX: 28.63 KG/M2 | WEIGHT: 200 LBS | SYSTOLIC BLOOD PRESSURE: 118 MMHG

## 2023-07-24 DIAGNOSIS — H49.22 SIXTH NERVE PALSY, LEFT: ICD-10-CM

## 2023-07-24 DIAGNOSIS — H49.12 4TH NERVE PALSY, LEFT: ICD-10-CM

## 2023-07-24 DIAGNOSIS — G93.0 EPIDERMOID CYST OF BRAIN: Primary | ICD-10-CM

## 2023-07-24 DIAGNOSIS — Z98.890 HX OF CRANIOTOMY: ICD-10-CM

## 2023-07-24 DIAGNOSIS — H49.22 LEFT ABDUCENS NERVE PALSY: ICD-10-CM

## 2023-07-24 DIAGNOSIS — D49.6 BRAIN TUMOR (HCC): ICD-10-CM

## 2023-07-24 PROCEDURE — 3074F SYST BP LT 130 MM HG: CPT | Performed by: STUDENT IN AN ORGANIZED HEALTH CARE EDUCATION/TRAINING PROGRAM

## 2023-07-24 PROCEDURE — 3078F DIAST BP <80 MM HG: CPT | Performed by: STUDENT IN AN ORGANIZED HEALTH CARE EDUCATION/TRAINING PROGRAM

## 2023-07-24 PROCEDURE — 99214 OFFICE O/P EST MOD 30 MIN: CPT | Performed by: STUDENT IN AN ORGANIZED HEALTH CARE EDUCATION/TRAINING PROGRAM

## 2023-07-24 PROCEDURE — 3008F BODY MASS INDEX DOCD: CPT | Performed by: STUDENT IN AN ORGANIZED HEALTH CARE EDUCATION/TRAINING PROGRAM

## 2023-07-24 NOTE — PROGRESS NOTES
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 Troy Jacques in October 2021. 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 He underwent a near gross total resection but some tumor over the exiting 7th nerve was adherent to the left behind likely more on the left side than the right. His last follow-up with Dr. Troy Jacques was December 2021. He had an MRI January 2022 that demonstrated near complete resection with no complications. In the interim, he has been lost to follow-up. Since surgery he reports worsening balance difficulties. He has had an arduous recovery trying to relearn how to walk as he remains dizzy intermittently, he has significant double vision, he states that he is lost over 100 pounds this past summer. He underwent a cholecystectomy since he was last seen in follow-up by neurosurgery. Of note, he also has rheumatoid arthritis and pain secondary to this. He has been seeing ophthalmology for his left eye palsy and diplopia. He reports that he had prisms made but this did not correct his diplopia completely. He states that he was told surgery was \"too much. \"  He reports wearing an eye patch intermittently. He denies ever using it on the right eye. INTERVAL HISTORY 7/24/2023:  Mr. Bridger Tay presents today for follow up. He had an MRI on 7/17/2023, he is here to discuss the results. He reports stability of his symptoms. Continues to have weakness of his eye leading to double vision and balance difficulties.      PAST MEDICAL HISTORY:  Past Medical History:   Diagnosis Date    Anxiety state     Brain tumor ([facility]     Rheumatoid arthritis ([facility]     Visual impairment     glasses     PAST SURGICAL HISTORY:  Past Surgical History:   Procedure Laterality Date    CHOLECYSTECTOMY      COLONOSCOPY N/A 2/17/2022    Procedure: COLONOSCOPY/ESOPHAGOGASTRODUODENOSCOPY (EGD); Surgeon: Epifanio Bolivar MD;  Location: [address] [place] [address] ENDOSCOPY    KNEE ARTHROSCOPY Left 2009    ACL/MCL repair     KNEE REPLACEMENT SURGERY Left 08/2018    OTHER  10/13/2021    SUBOCCIPITAL CRANIECTOMY FOR TUMOR WITH NEURO NAVIGATION, MICROSCOPE DISSECTION AND NEURO MONITORING     FAMILY HISTORY:  family history includes No Known Problems in his brother, daughter, father, maternal grandfather, maternal grandmother, mother, paternal grandfather, paternal grandmother, sister, son, and another family member. SOCIAL HISTORY:   reports that he has never smoked. He has never used smokeless tobacco. He reports that he does not currently use alcohol. He reports that he does not use drugs. ALLERGIES:    Dust Mite Extract       OTHER (SEE COMMENTS)    Comment:Watery eyes, sneezing             Watery eyes, sneezing  Cat Hair Extract        OTHER (SEE COMMENTS)    Comment:Watery eyes, sneezing  Dust                    OTHER (SEE COMMENTS)    Comment:Watery eyes, sneezing    MEDICATIONS:  HYDROcodone-acetaminophen (NORCO)  MG Oral Tab, Take 1 tablet by mouth every 4 (four) hours as needed for Pain., Disp: 60 tablet, Rfl: 0  ALPRAZolam (XANAX) 0.25 MG Oral Tab, Take 1 tablet (0.25 mg total) by mouth 2 (two) times daily as needed. , Disp: 60 tablet, Rfl: 0  Tocilizumab (ACTEMRA ACTPEN) 162 MG/0.9ML Subcutaneous Solution Auto-injector, Inject 162 mg into the skin every 7 days. , Disp: 4 each, Rfl: 5  folic acid 1 MG Oral Tab, Take 1 tablet (1 mg total) by mouth once daily. , Disp: 90 tablet, Rfl: 3  Meloxicam 15 MG Oral Tab, Take 1 tablet (15 mg total) by mouth daily. , Disp: 90 tablet, Rfl: 1  methotrexate 2.5 MG Oral Tab, TAKE 9 TABLETS BY MOUTH ONCE WEEKLY., Disp: 119 tablet, Rfl: 1  predniSONE 10 MG Oral Tab, Take 2 tablets (20 mg total) by mouth daily. , Disp: 180 tablet, Rfl: 1  sulfaSALAzine 500 MG Oral Tab, Take 1 tablet (500 mg total) by mouth 2 (two) times daily. With meals, Disp: 180 tablet, Rfl: 1  butalbital-acetaminophen-caffeine -40 MG Oral Tab, Take 1 tablet by mouth every 4 (four) hours as needed for Pain., Disp: 30 tablet, Rfl: 1  Multiple Vitamins-Minerals (MULTI-VITAMIN/MINERALS) Oral Tab, Take 1 tablet by mouth daily. , Disp: , Rfl:     No current facility-administered medications on file prior to visit. REVIEW OF SYSTEMS:  All other systems were reviewed and were negative except for those previously mentioned in the HPI     PHYSICAL EXAMINATION:  General: No acute distress. Respiratory: Non-labored respirations bilaterally. No audible wheezing  Cardiovascular: Extremities warm and well-perfused. Abdomen: Soft, nontender, nondistended. Musculoskeletal: Moves all extremities well, symmetrically. Extremities: No edema.      NEUROLOGIC EXAMINATION:  Mental status: Alert and oriented x 3  Speech: Clear, fluent  Cranial nerves: PERRLA, left 4th nerve palsy, diplopia with leftward and downward gaze, face symmetric, with normal strength and sensation, tongue and palate midline, SCM 5/5 bilaterally  Motor:            RIGHT  Delt 5/5   Bic 5/5  Tri 5/5   HI 5/5   Wflex 5/5   Wex 5/5   5/5  IP 5/5   Quad 5/5   Ham 5/5   AT 5/5   EHL 5/5 Nyla 5/5           LEFT    Delt 5/5   Bic 5/5  Tri 5/5   HI 5/5   Wflex 5/5   Wex 5/5   5/5  IP 5/5   Quad 5/5   Ham 5/5   AT 5/5   EHL 5/5 Nyla 5/5      No pronator drift  Tone: Normal  Atrophy/Fasciculations: None  Sensation: Normal to light touch, symmetric, no neglect  Cerebellar: Normal finger nose finger  Gait: Normal    IMAGING:  MR brain 7/17/2023: Postoperative changes related to suboccipital craniotomy with near complete resection of previously known fourth ventricle epidermoid cyst. Stable punctate focus measuring 4 mm of diffuse restriction along the left inferior lateral margin of the fourth ventricle which in the setting of known tumor residual intraoperatively, stable compared to MRI from 1/2023. ASSESSMENT:  77-year-old status post suboccipital craniotomy for resection of fourth ventricular epidermoid cyst with near gross total resection. Most recent MRI demonstrates stability of a 4 mm punctate focus of diffusion restriction. We will repeat an MRI in 1 year. Plan:  - RTC July 2024  - MR brain w/ and w/o prior to next    Marco Teixeira MD  Neurological Surgery    [facility] [address] [place] [address], [place] [phone]  Pager   7/24/2023 9:51 AM      This note was created using a voice-recognition transcribing system. Incorrect words or phrases may have been missed during proofreading. Please interpret accordingly. Total Time    Established Patient Total Time       30  minutes. Activities       Preparing to see the patient (chart/tests/imaging review). Obtaining and/or reviewing separately obtained history. Performing a medically appropriate examination and/or evaluation. Counseling and educating the patient/family/caregiver. Ordering medications, tests, or procedures. Referring and communicating with other health care professionals (when not separately reported). Documenting clincal information in the electronic or other health record. Independently interpreting results (not separately reported). Communicating results to the patient/family/caregiver. Care coordination (not separately reported).

## 2023-07-24 NOTE — PROGRESS NOTES
Patient here for follow up for cyst of the brain       States his vision is not good, balance is way off. Sates he's still the same  since LOV.       Prior diagnostic testing related to this condition:      MRI of the brain 7.17.23

## 2023-08-02 ENCOUNTER — HOSPITAL ENCOUNTER (OUTPATIENT)
Dept: GENERAL RADIOLOGY | Age: 33
Discharge: HOME | End: 2023-08-02
Attending: INTERNAL MEDICINE
Payer: COMMERCIAL

## 2023-08-02 DIAGNOSIS — M25.512 NONTRAUMATIC SHOULDER PAIN, LEFT: ICD-10-CM

## 2023-08-02 PROCEDURE — 73030 X-RAY EXAM OF SHOULDER: CPT | Performed by: INTERNAL MEDICINE

## 2023-08-03 RX ORDER — PREDNISONE 10 MG/1
20 TABLET ORAL DAILY
Qty: 60 TABLET | Refills: 3 | Status: SHIPPED | OUTPATIENT
Start: 2023-08-03

## 2023-08-03 NOTE — TELEPHONE ENCOUNTER
Medication Quantity Refills Start End   predniSONE 10 MG Oral Tab 180 tablet 1 6/12/2023    LOV: 6/12/23  Future Appointments   Date Time Provider Rodrigo Cooper   8/14/2023  2:50 PM Florencia Walker MD [address] [facility]   8/15/2023 12:40 PM Leonard Rosales MD [facility]     Labs:   Component      Latest Ref Rng 4/11/2023   WBC      4.0 - 11.0 x10(3) uL 11.8 (H)    RBC      4.30 - 5.70 x10(6)uL 4.64    Hemoglobin      13.0 - 17.5 g/dL 15.2    Hematocrit      39.0 - 53.0 % 46.5    MCV      80.0 - 100.0 fL 100.2 (H)    MCH      26.0 - 34.0 pg 32.8    MCHC      31.0 - 37.0 g/dL 32.7    RDW      11.0 - 15.0 % 12.7    RDW-SD      35.1 - 46.3 fL 46.6 (H)    Platelet Count      275.8 - 450.0 10(3)uL 201.0    CREATININE      0.70 - 1.30 mg/dL 0.83    eGFR-Cr      >=60 mL/min/1.73m2 118    SED RATE      0 - 15 mm/Hr 20 (H)    C-REACTIVE PROTEIN      <0.30 mg/dL 2.60 (H)    ALT (SGPT)      16 - 61 U/L 34    AST (SGOT)      15 - 37 U/L 20    Albumin      3.4 - 5.0 g/dL 3.8       Legend:  (H) High  Summary:  1   Try actemra 162mg every week , since weight based , then can try for weekly  2. Cont. Methotrexate 9 tablets a week   3. .Cont. Folic acid 1mg a day   4. Cont. Prednisone  20mg a day   5. Cont. sulfasalazine 500mg twice a day   6. Return to clinic in 1-2months. 7. meloxicam 15mg a day   8. norco as needed   9.  Check labs on next visit      - next visit taper to pred 15mg a day -if possible      Mathieu Nina MD  6/12/2023   10:05 AM  - Reviewed [place]- information  through [facility]

## 2023-08-14 ENCOUNTER — NURSE ONLY (OUTPATIENT)
Dept: RHEUMATOLOGY | Facility: CLINIC | Age: 33
End: 2023-08-14

## 2023-08-14 ENCOUNTER — OFFICE VISIT (OUTPATIENT)
Dept: RHEUMATOLOGY | Facility: CLINIC | Age: 33
End: 2023-08-14

## 2023-08-14 ENCOUNTER — LAB ENCOUNTER (OUTPATIENT)
Dept: LAB | Facility: HOSPITAL | Age: 33
End: 2023-08-14
Attending: INTERNAL MEDICINE
Payer: COMMERCIAL

## 2023-08-14 VITALS
DIASTOLIC BLOOD PRESSURE: 62 MMHG | HEART RATE: 70 BPM | WEIGHT: 204.81 LBS | SYSTOLIC BLOOD PRESSURE: 102 MMHG | BODY MASS INDEX: 29.32 KG/M2 | HEIGHT: 70 IN | RESPIRATION RATE: 16 BRPM

## 2023-08-14 DIAGNOSIS — M06.9 RHEUMATOID ARTHRITIS, INVOLVING UNSPECIFIED SITE, UNSPECIFIED WHETHER RHEUMATOID FACTOR PRESENT (HCC): Primary | ICD-10-CM

## 2023-08-14 DIAGNOSIS — Z51.81 THERAPEUTIC DRUG MONITORING: ICD-10-CM

## 2023-08-14 DIAGNOSIS — M06.9 RHEUMATOID ARTHRITIS, INVOLVING UNSPECIFIED SITE, UNSPECIFIED WHETHER RHEUMATOID FACTOR PRESENT (HCC): ICD-10-CM

## 2023-08-14 LAB
ALT SERPL-CCNC: 19 U/L
AST SERPL-CCNC: 14 U/L (ref 15–37)
CREAT BLD-MCNC: 0.79 MG/DL
CRP SERPL-MCNC: 0.43 MG/DL (ref ?–0.3)
DEPRECATED RDW RBC AUTO: 44.8 FL (ref 35.1–46.3)
EGFRCR SERPLBLD CKD-EPI 2021: 120 ML/MIN/1.73M2 (ref 60–?)
ERYTHROCYTE [DISTWIDTH] IN BLOOD BY AUTOMATED COUNT: 12.1 % (ref 11–15)
ERYTHROCYTE [SEDIMENTATION RATE] IN BLOOD: 11 MM/HR
HCT VFR BLD AUTO: 42.6 %
HGB BLD-MCNC: 14 G/DL
MCH RBC QN AUTO: 33.3 PG (ref 26–34)
MCHC RBC AUTO-ENTMCNC: 32.9 G/DL (ref 31–37)
MCV RBC AUTO: 101.4 FL
PLATELET # BLD AUTO: 223 10(3)UL (ref 150–450)
RBC # BLD AUTO: 4.2 X10(6)UL
WBC # BLD AUTO: 10 X10(3) UL (ref 4–11)

## 2023-08-14 PROCEDURE — 84450 TRANSFERASE (AST) (SGOT): CPT

## 2023-08-14 PROCEDURE — 85027 COMPLETE CBC AUTOMATED: CPT

## 2023-08-14 PROCEDURE — 85652 RBC SED RATE AUTOMATED: CPT

## 2023-08-14 PROCEDURE — 82565 ASSAY OF CREATININE: CPT

## 2023-08-14 PROCEDURE — 99214 OFFICE O/P EST MOD 30 MIN: CPT | Performed by: INTERNAL MEDICINE

## 2023-08-14 PROCEDURE — 3008F BODY MASS INDEX DOCD: CPT | Performed by: INTERNAL MEDICINE

## 2023-08-14 PROCEDURE — 3078F DIAST BP <80 MM HG: CPT | Performed by: INTERNAL MEDICINE

## 2023-08-14 PROCEDURE — 3074F SYST BP LT 130 MM HG: CPT | Performed by: INTERNAL MEDICINE

## 2023-08-14 PROCEDURE — 36415 COLL VENOUS BLD VENIPUNCTURE: CPT

## 2023-08-14 PROCEDURE — 86140 C-REACTIVE PROTEIN: CPT

## 2023-08-14 PROCEDURE — 84460 ALANINE AMINO (ALT) (SGPT): CPT

## 2023-08-14 NOTE — PATIENT INSTRUCTIONS
1   start actemra 162mg every week ,= will give sample shot today -   2. Cont. Methotrexate 9 tablets a week   3. .Cont. Folic acid 1mg a day   4. Cont. Prednisone  20mg a day   5. Cont. sulfasalazine 500mg twice a day   6. Return to clinic [id number]. 7. meloxicam 15mg a day   8. norco as needed   9.  Check labs today    - follow up on 10/23 at 12:40 pm

## 2023-08-14 NOTE — PATIENT INSTRUCTIONS
Tocilizumab Injection  Brands: Actemra  Uses  This medicine is used for the following purposes:  arthritis  inflammation of blood vessels  COVID-19 (coronavirus)  Instructions  This medicine is given as an IV injection into a vein. This medicine is given gradually through the IV line. This medicine should be given over 60 minutes. The liquid should be clear or light yellow. Keep this medicine in the refrigerator. Do not freeze. Never use any medicine that has . Ask your doctor, nurse or pharmacist to show you how to use this medicine correctly. You or a family member can be trained to give this medicine at home. It is important that you keep taking each dose of this medicine on time even if you are feeling well. If you miss a dose, contact your doctor for instructions. Drug interactions can change how medicines work or increase risk for side effects. Tell your health care providers about all medicines taken. Include prescription and over-the-counter medicines, vitamins, and herbal medicines. Speak with your doctor or pharmacist before starting or stopping any medicine. Tell your doctor if symptoms do not get better or if they get worse. This medicine may decrease the effectiveness of some birth controls which use hormones (such as birth control pills and patches). Use an extra form of birth control, such as condoms, while on this medicine. Keep all appointments for medical exams and tests while on this medicine. Cautions  Tell your doctor and pharmacist if you ever had an allergic reaction to a medicine. Some patients on this medicine have developed severe, life-threatening infections. Please speak with your doctor about the risks and benefits of using this medicine. Do not use the medication any more than instructed. This medicine may reduce your body's ability to fight infections. Avoid contact with people with colds, flu or other infections.  Contact your doctor if you develop fever, cough, sore throat, or chills. Speak with your health care provider before receiving any vaccinations. Tell the doctor or pharmacist if you are pregnant, planning to be pregnant, or breastfeeding. Used needles and syringes should be thrown away properly in a medical waste container. Ask your doctor or pharmacist if you need help. Some patients have serious side effects from this medicine. Ask your pharmacist to show you the information from the Food and Drug Administration (FDA) and discuss it with you. Side Effects  The following is a list of some common side effects from this medicine. Please speak with your doctor about what you should do if you experience these or other side effects. headaches  pain, redness, swelling near injection  Call your doctor or get medical help right away if you notice any of these more serious side effects:  pain in the abdomen  cough that does not go away  dizziness  swelling of the face, mouth, tongue or throat  fever or chills  high fever  signs of liver damage (such as yellowing of eye or skin, dark urine, or unusual tiredness)  muscle pain  red, burning, or itchy skin  shortness of breath  sore throat  stomach pain  unusual or unexplained tiredness or weakness  increased urinary frequency  difficulty or discomfort urinating  blood in urine  A few people may have an allergic reaction to this medicine. Symptoms can include difficulty breathing, skin rash, itching, swelling, or severe dizziness. If you notice any of these symptoms, seek medical help quickly. Extra  Please speak with your doctor, nurse, or pharmacist if you have any questions about this medicine. [email or web address]  IMPORTANT NOTE: This document tells you briefly how to take your medicine, but it does not tell you all there is to know about it. Your doctor or pharmacist may give you other documents about your medicine. Please talk to them if you have any questions. Always follow their advice. There is a more complete description of this medicine available in [place]. Scan this code on your smartphone or tablet or use the web address below. You can also ask your pharmacist for a printout. If you have any questions, please ask your pharmacist. The display and use of this drug information is subject to Terms of Use. Copyright(c) 2022 [employer] [address] 2927-2530 [facility]37. All rights reserved. This information is not intended as a substitute for professional medical care. Always follow your healthcare professional's instructions.

## 2023-08-14 NOTE — PROGRESS NOTES
Patient came in for [employer] teaching. Name and  verified. Patient educated on proper handling/storage/disposal of medications. Patient informed of proper injection and aseptic technique. Patient educated on potential side effects. Educational materials given to patient on medication. Patient practiced several times with demo pen with great technique. Patient then injected in to Right Thigh with excellent technique under LPN supervision. Patient tolerated medication well. Patient questions and concerns answered. Patient instructed to call us with any further questions. Patient given hike starter santiago, was instructed to call and activate copay card. 25 min spent in education session. Patient aware to complete monitoring labs in 1 month and then follow up with provider. LOT# [id number] EXP [id number] was sample provided to patient in office.

## 2023-10-18 ENCOUNTER — HOSPITAL ENCOUNTER (OUTPATIENT)
Dept: GENERAL RADIOLOGY | Age: 33
Discharge: HOME | End: 2023-10-18
Payer: COMMERCIAL

## 2023-10-18 DIAGNOSIS — Z01.818 PRE-OP EXAMINATION: ICD-10-CM

## 2023-10-18 PROBLEM — H49.22: Status: ACTIVE | Noted: 2023-10-11

## 2023-10-18 PROCEDURE — 71046 X-RAY EXAM CHEST 2 VIEWS: CPT

## 2023-10-23 ENCOUNTER — OFFICE VISIT (OUTPATIENT)
Dept: RHEUMATOLOGY | Facility: CLINIC | Age: 33
End: 2023-10-23

## 2023-10-23 VITALS
HEART RATE: 81 BPM | SYSTOLIC BLOOD PRESSURE: 112 MMHG | BODY MASS INDEX: 28.92 KG/M2 | HEIGHT: 70 IN | WEIGHT: 202 LBS | DIASTOLIC BLOOD PRESSURE: 78 MMHG

## 2023-10-23 DIAGNOSIS — M06.9 RHEUMATOID ARTHRITIS, INVOLVING UNSPECIFIED SITE, UNSPECIFIED WHETHER RHEUMATOID FACTOR PRESENT (HCC): Primary | ICD-10-CM

## 2023-10-23 DIAGNOSIS — G89.29 CHRONIC LEFT SHOULDER PAIN: ICD-10-CM

## 2023-10-23 DIAGNOSIS — Z51.81 THERAPEUTIC DRUG MONITORING: ICD-10-CM

## 2023-10-23 DIAGNOSIS — M25.512 CHRONIC LEFT SHOULDER PAIN: ICD-10-CM

## 2023-10-23 PROCEDURE — 3074F SYST BP LT 130 MM HG: CPT | Performed by: INTERNAL MEDICINE

## 2023-10-23 PROCEDURE — 3008F BODY MASS INDEX DOCD: CPT | Performed by: INTERNAL MEDICINE

## 2023-10-23 PROCEDURE — 99214 OFFICE O/P EST MOD 30 MIN: CPT | Performed by: INTERNAL MEDICINE

## 2023-10-23 PROCEDURE — 3078F DIAST BP <80 MM HG: CPT | Performed by: INTERNAL MEDICINE

## 2023-10-26 ENCOUNTER — TELEPHONE (OUTPATIENT)
Dept: RHEUMATOLOGY | Facility: CLINIC | Age: 33
End: 2023-10-26

## 2023-10-26 NOTE — TELEPHONE ENCOUNTER
Fern Hickman @[facility]  [phone]   Fax: [phone]    Please fax over Rheumatologic Pre-Op Clearance for patient.   Surgery is 11-10-23  Cecil Blizzard said he was seen by Dr Tyree Keith on 10-23

## 2023-10-27 ENCOUNTER — TELEPHONE (OUTPATIENT)
Dept: RHEUMATOLOGY | Facility: CLINIC | Age: 33
End: 2023-10-27

## 2023-10-27 NOTE — TELEPHONE ENCOUNTER
Frank Graves from [address] [place] [address] office (orthopedics) would like to discuss patients medication he is having shoulder surgery and they want to prescribe Asprin but patient is also taking methotrexate. Wants to do methotrexate once daily for two weeks if its okay .

## 2023-10-30 NOTE — TELEPHONE ENCOUNTER
George Chawla is calling and checking the status on this. Patient is scheduled for surgery on 11/10/2023 and they need to know if Kole Ortiz is clearing patient for surgery. Please fax pre-op information to following fax#.       Fax# [phone]

## 2023-10-31 NOTE — TELEPHONE ENCOUNTER
Palomo Clay's call. Plan is to put pt on ASA 325mg PO daily for 2 weeks post-op. Dr Ascencion Melchor requesting approval of this plan from Dr Adán Campbell due to the fact that the pt is on Methotreaxte and ASA may increase Methotrexate exposure.

## 2023-10-31 NOTE — PATIENT INSTRUCTIONS
1   cont. actemra 162mg every week - - hold 1 week before and 2 weeks afater   2. Cont. Methotrexate 9 tablets a week - hold 1 week before and 1 week after   3. .Cont. Folic acid 1mg a day   4. Cont. Prednisone  20mg a day - will need stress dose steroids. , cont. Through the surgery   Plan for stress dose steroids - Give 50 mg hydrocortisone intravenously just before the procedure and 25 mg of hydrocortisone every eight hours for 24 hours -   5. Cont. sulfasalazine 500mg twice a day - hold 1 week before and 1 week after   6. Return to clinic [id number]. 7. meloxicam 15mg a day   8. norco as needed   9.  Check labs today

## 2023-10-31 NOTE — TELEPHONE ENCOUNTER
Please fax communication made on 10.23 visit - to dr. Mariely Orozco office -   Can you review this with the patient as well - make sure he knows to hold the actemra - - try for 2 week prior but if he already got it - then at least 1 week prior and 2 weeks fater , all other meds hold 1 week prior and 1 week after surgery

## 2023-10-31 NOTE — TELEPHONE ENCOUNTER
Anthony Mediate, Dr. Juan Carlos Mercado calling to inform fax was received, but still waiting on an answer regarding rx Aspirin. Please call at [phone],[phone].

## 2023-11-09 ENCOUNTER — TELEPHONE (OUTPATIENT)
Dept: RHEUMATOLOGY | Facility: CLINIC | Age: 33
End: 2023-11-09

## 2023-11-09 NOTE — TELEPHONE ENCOUNTER
Patient is calling and checking the status on this as he has not gotten a response. I explained to him that the message was routed to  and she is with patients at the moment. He is requesting a callback ASAP.

## 2023-11-09 NOTE — TELEPHONE ENCOUNTER
Patient has questions regarding his medication to take after his surgery.      Out patient surgery is scheduled on 11/10/2023

## 2023-11-10 NOTE — TELEPHONE ENCOUNTER
Talked to pt. - he Is going to get iv steroids - but since this is oupt.  - he can just resume - prednione 20mg post op

## 2023-11-14 RX ORDER — MELOXICAM 15 MG/1
15 TABLET ORAL DAILY
Qty: 90 TABLET | Refills: 1 | Status: SHIPPED | OUTPATIENT
Start: 2023-11-14

## 2023-11-14 NOTE — TELEPHONE ENCOUNTER
LOV: 10/23/23  Last Refilled:#90, 1rf 6/12/23  Labs:CRT  0.69  10/19/23  Future Appointments   Date Time Provider Rodrigo Cooper   1/29/2024  2:30 PM Harsh Pickard MD [facility]     Summary:  1   cont. actemra 162mg every week - - hold 1 week before and 2 weeks afater   2. Cont. Methotrexate 9 tablets a week - hold 1 week before and 1 week after   3. .Cont. Folic acid 1mg a day   4. Cont. Prednisone  20mg a day - will need stress dose steroids. , cont. Through the surgery   Plan for stress dose steroids - Usual preoperative steroids + 25 mg hydrocortisone at induction + 100 mg hydrocortisone/day for 2-3 days Resume normal oral therapy post op or getting discharged of pred 20mg a day   5. Cont. sulfasalazine 500mg twice a day - hold 1 week before and 1 week after   6. Return to clinic [id number]. 7. meloxicam 15mg a day   8. norco as needed   9. Check labs today        - cleared for surgery but b/c of his dose of steroids likely a moderate risk for infection -   - next visit taper to pred 15mg a day -if possible      Moe Colindres MD  10/23/2023   12:59 PM  - Reviewed [place]- [place]  through Epic                     Please advise.

## 2023-11-24 ENCOUNTER — HOSPITAL ENCOUNTER (OUTPATIENT)
Age: 33
Discharge: HOME | End: 2023-11-24
Payer: COMMERCIAL

## 2023-11-24 VITALS
TEMPERATURE: 99 F | OXYGEN SATURATION: 100 % | DIASTOLIC BLOOD PRESSURE: 69 MMHG | RESPIRATION RATE: 18 BRPM | SYSTOLIC BLOOD PRESSURE: 136 MMHG | HEART RATE: 92 BPM

## 2023-11-24 DIAGNOSIS — J02.0 STREPTOCOCCAL SORE THROAT: Primary | ICD-10-CM

## 2023-11-24 LAB — S PYO AG THROAT QL: POSITIVE

## 2023-11-24 RX ORDER — PENICILLIN V POTASSIUM 500 MG/1
500 TABLET ORAL 2 TIMES DAILY
Qty: 20 TABLET | Refills: 0 | Status: SHIPPED | OUTPATIENT
Start: 2023-11-24 | End: 2023-12-04

## 2024-01-29 ENCOUNTER — OFFICE VISIT (OUTPATIENT)
Dept: RHEUMATOLOGY | Facility: CLINIC | Age: 34
End: 2024-01-29

## 2024-01-29 VITALS
HEIGHT: 70 IN | HEART RATE: 75 BPM | DIASTOLIC BLOOD PRESSURE: 60 MMHG | BODY MASS INDEX: 30.21 KG/M2 | SYSTOLIC BLOOD PRESSURE: 111 MMHG | RESPIRATION RATE: 17 BRPM | WEIGHT: 211 LBS

## 2024-01-29 DIAGNOSIS — Z51.81 THERAPEUTIC DRUG MONITORING: ICD-10-CM

## 2024-01-29 DIAGNOSIS — M06.9 RHEUMATOID ARTHRITIS, INVOLVING UNSPECIFIED SITE, UNSPECIFIED WHETHER RHEUMATOID FACTOR PRESENT (HCC): Primary | ICD-10-CM

## 2024-01-29 PROCEDURE — 3078F DIAST BP <80 MM HG: CPT | Performed by: INTERNAL MEDICINE

## 2024-01-29 PROCEDURE — 3074F SYST BP LT 130 MM HG: CPT | Performed by: INTERNAL MEDICINE

## 2024-01-29 PROCEDURE — 3008F BODY MASS INDEX DOCD: CPT | Performed by: INTERNAL MEDICINE

## 2024-01-29 PROCEDURE — 99214 OFFICE O/P EST MOD 30 MIN: CPT | Performed by: INTERNAL MEDICINE

## 2024-01-29 RX ORDER — PREDNISONE 10 MG/1
10 TABLET ORAL DAILY
Qty: 90 TABLET | Refills: 1 | Status: SHIPPED | OUTPATIENT
Start: 2024-01-29 | End: 2024-04-28

## 2024-01-29 RX ORDER — LEFLUNOMIDE 10 MG/1
10 TABLET ORAL DAILY
Qty: 30 TABLET | Refills: 3 | Status: SHIPPED | OUTPATIENT
Start: 2024-01-29

## 2024-01-29 RX ORDER — SULFASALAZINE 500 MG/1
500 TABLET ORAL 2 TIMES DAILY
Qty: 180 TABLET | Refills: 1 | Status: SHIPPED | OUTPATIENT
Start: 2024-01-29

## 2024-01-29 NOTE — PATIENT INSTRUCTIONS
1   cont. actemra 162mg every week -   2.  Cont. Methotrexate 9 tablets a week - .Cont. Folic acid 1mg a day   3. Cont. Leflunomide 10mg a day -   4. Cont.  Prednisone  10mg a day -  5.  Cont. sulfasalazine 500mg twice a day -   6. Return to clinic in 3 months. 4/10 at 3:30 pm   7. meloxicam 15mg a day   8. norco as needed   9. Check labs today  and then every 3months

## 2024-01-29 NOTE — PROGRESS NOTES
Domingo George is a 33 year old male who presents for   Chief Complaint   Patient presents with    Rheumatoid Arthritis    Medication Follow-Up    Foot Pain     B/L    Ankle Pain     B/L   .   HPI:     I had the pleasure of seeing Domingo George on 2/15/2020 for evaluation.    He is a pleasant 29 year old who had seropositive RA - since age 14. His rheumatologist is retiring and he is re-establishing care.   He was diagnosed in [place]. He started getting pain and swellign in his left knee. His other joitns - his left wrist is gradaully affected. And his 5th fingers - he can't bend now.   He is on methotrexate 20mg a week, fa and [id number] a day.   He has taken prednisone as needed. He still gets swelling around his left knee. He can walk with the left knee replacement and still gets swelling.   He had torn his ACL and MCL in his lef tknee prior ot that. Then on MRI of his left knee in 2018 he was found to have AVN.     He used to be on Enbrel but couldn't stay on it b/c of the cost. It's been a year since he was on it.  He has new insurance now.   He  started on humira when he was first diagnosed but it was too expensive. He also would get injection site burning senstaion.  But then he also was on iv medication. He can't remember if it was remicade. Overall it became  Expensive. Then he was on enbrel for around 4 years around age 19. . Then around 24 he started having issues with being on biologics b/c of the expensive of the medication. It's been off and on since.   He hasn't seen dr. martin since after his left knee surgery. It;s been a long recovery for him. He hasn't been back to see her. He just coudnlt' seen her in the hours she had.     He has gradually increased back pain. When his back acts up he gets shortness of breath. If hes' lifting or bending, his back can get tight. Not much pain when laying down. 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   His right knee on occasion can  hurt. And occl his elbows. occl his right ankle bothers him. No hip pain.   He has pain in his upper back.     He's not been on any other medication.   His eyes have gotten red and hurt. He has never seen an eye doctor. He's going to see an eye doctor.   No hx of psoriasis.     He sees dr. velazquez for a few years. He has been filling the methotrexate.   He feels some days are better than others.   He mainly has pain in his elbows, right knee and occl his hands.       In 2018, he had left knee arthroplasty with Dr. Schmitt.     6/1/2020  He forgot to get labs - he is going to try to goto tomorrow.   He misplaced the orders. He still wants to try enbrel  So he is just on methotrexate 20mg a week and meloxicam. He feels pretty good. He has his ups and downs.   He has about 2-3/10.   His right knee and his hands hurt a litlte bit.   It's manageable. He feels the weather fluctuations flare him.   No flares.   He's had a few flares - little ones.   He has about 1 hours of morning stiffness   His left side of neck gets stiff in the hims  He has mild elbow pain as well.   He has a lot of stress with work - and he works for [employer]     9/1/2020   He just had his labs done yesterday at [facility] - it was in [place] -   The methotrexate he's good. He feels pretty good and he has goo and bad days. He feels his pain is 2/10. He feels the weather and what he is doing matters.   He still wants to start enbrel. He hasn't started yet.   He feels still 1 hour of morning stiffnes. He feels sitting for long period times .   He feels latealy his elbows are acting up more often and his wrists.   He has limitation in range of mtion of his joitns.   He has noticed his neck at the base  - it hurts more often. He has migraines so doesn't know if it's 'related.   He has good rom. It acts up now and then.   He takes pain meds - but limits it if the pain is really bad.     2/20/2021  He waited to start enbrel b/c of covid.   His wife got covid in  12/2020 and he thinks hemight have had it.   He is feeling worse and worse by the day.   He is having about 5/10 pain. It' snot good.   He has pain on his left shoulder , hands , feet , ankles and right knee.   He has left elbow pain. He can't straighten that.     5/15/2021  He has 5/10 pain . He has a lot of discomfrot an pain.   He is expecting his 2nd baby in 11/10/2021   His feet , ankles and right knee hurt pretty bad. He saw dr. 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   He has pian in his hands, left shouldera and right hip.   He is not started the enbrel since last time. He's not gotten a call.   He did change insurance at the beginning of the year nad it looks like the script [facility].   He is truding along.     8/21/2021  He's not started the enbrel yet.   He states he didn't get a phone call. He also doesn't check my chart.   He is losing weight and appetitis. If he eats then he has diarrhea or vomitting.   He didn't do his labs.   He has bad left shoulder and ankle pain.   He is having 8/10 pain.   The kenalog shot helped for 1 week, then the medrol santiago helped for 1 week.     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     He had bad falre up in rehab. He couldn't walk b/c of his arthrits.   He had started prednisone in end of October. Now at home he's on prednisone 5mg a day.   He was given trexall 2 tablets - 10mg each - 20mg every week -   He last took 2 weeks ago. He missed this Saturday.   The flares up s were better til last week.   He's been on enbrel in beg of 11/2021  No trouble with the shots.   The pain gets to 7/10 pain. - feet, left knee, left hand and shoulder.     He's done with physical therapy - on 11/24/2021 -   hes' walking with a walker - using it mostly for balance.   It's not the pain. He's praciit=icing     3/10/2022  hes' having right knee  swellign and it hurts and his hands hurt.   He cant' use his left shoulder.   He ahs 10/10 pain.   He actually feels worse than his last visit.   He's in a wheel chair to get around.   He has lost a lot of weight. He's vomitting after eating.   He doesn't feel the medication is making him nauseaous. He has lost 73 pouns -   He had a colonsocopy and EGD - and he's throwing up all the time. He's doing this since last year.   Removing the tumor did not help.   He took the medrol santiago and that helps him efor tat week.     7/5/2022  He hasn't been doing orencia b/c he was told it was denied.   He was dx with gastropereisis and had cholecystecomy.   evern since it's removed, he is able to be removed but it's still hurting.   It's not hurting.   He's vomitting - still post cck, it's not daily.   He throws up a couple of ton a week     He has ongoing right hip pain - right hsoulder and right wrist pain.   He has the major area of pain.   The right knee has been ok -   He's on prednison e10mg a day - , methtorexate 8 tablets a week   Off enbrel since 3/2022 -     No fevers.   Post surgery he has no pain.   All his pain is joint pain.     He's taking this norco post surgery - he's taking 1 daily -   [id number] - breaks them in half and takes 1/2 tablets.     No diarrhea -   He feels fine - he's not eating or over doing it - [facility].     He lost 15lbs.       8/8/2022  He's been taking orecia for the last 4 weeks.   He is also musa cammy prednisone 20mg ad starr.   He rhas 5-6/10 pain . He is using a cane.   Hthe pain is much better than what it was.   His eating is a lot better.   He has gained weight. He has 20 pound weight gain. He's able to keep it down.   He has no side effects with hte prednisone.   The orencia feels better than the enbrel in terms of injection.   His right hip, right wrist and right shoulder is better but still pain.   His right knee is acting up right now.   He's trying to push himslef to  exercise more.   Started orencia weekly in 7/2022 -     10/10/2022  He doesn't need a cane. He feels his weight is getting better.   His hands , feet and shoulders hurt really badyly and it's hard for him to  things now.   He has a bout 7/10 pain.   He has a hard time closing his hands.   The orencia is on for about 3 months now.   He feels the waether changing is playing a factor in more achyiness.   No stomach pain.   His stomach is better - rarely any vomitting.   He does have more pain than his last visit - thinks it's the weather.   He's still on pred 20mg a day, orencia 125mg a week and methtorexate 20mg a week.    2/1/2023  His pain is 7/10 pain. His sed rate was 71mm/hr. I increased his pred to 30mg a day    His left shoulder is really hard to lift. definietly the pain spikes up more.   He is tolerating the ssz but he's not really noticing a differnce with it.   He's not vomitting as much as he used to.   He still has balance if off. He's trying to walk without the cane. He's off physical therapy.   He can close his hands. He still can't  things well.     4/11/2023  He has been off orencia and not started rinvoq yet. He was approved but there was a snafu on using the savings card and he was billed $3000.   He has pain in his hands and feet and ankles.   He is worse off biologic at this points.   He's on methorexate 9 tablets a week and prednisone 20mg a day.   He has 7/10 pain . He has more pain off orencia.   He has gained weight.     His left shoulder injection only lasted a few weeks.   He can't move it mouch.   He needs help getting dressed.   He's applying for disaiblity.     6/12/2023   He checked with the [employer] rep and then his pharmacy - there eis nothing on his bill now.   He hasn't started rinvoq yet - he is very worried the same thing is going to happen when he gets the next month and he can't get in touch with anyone who can assure him it won't.   He has 8/10 pain - he has alot of left  shoulder wrists, and back. He has back spasms and difficulty breathing when that happenes.     He hasn't been working for 1 year - and appliying for disability   He still has issue with his gastroparesis but not as severe as it was   He's on pred 20mg a day, methotrexate 9 tablets a week and ssz 500mg bid.     8/14/2023  He got the assitance card, and he scheduled the shipment.   He is not on actemra yet - saw PA on 6/14/2023 - and he finally is getting it.   His back and left shoulder is huritng badly - his ankles and hands are hurting as well.   He is still badly having phi apin. He is walking without assitance still.   There hasn't been any change since his last viist.   His stomach is stable.   He's on pred 20mg a day, methotrexate 9 tablets a week and ssz 500mg bid.     10/23/2023  He has been on actemra - and he feels no different with it.   No side effects.   He's till on pred 20g a day.   No issues with billing.   He's having surgery on 11/10.   His walking I horrible. Hisi ankles are painful. He ha 01/10 apin.   Hi knes are finge.   Hi hands , elbows and left shoulder is bad.   He's getting surgery with dr. Clements at [facility]    1/29/2024  He had left surgery on 11/10/2023 he is doing ok with this.   His feet and ankles  and hands and left elbow is horrbile. .   He does feel the actemra is keeping it at bay.   Since novemeber he's on prednisone 10mg a day he's handling it.  His feet can hurt on walking - 7/10 pain.   Getting eye surgery in 5/2023 - to improved the double vision and misaligngment in his left eye.   No covid, no infections since his last visit.     Wt Readings from Last 2 Encounters:   01/29/24 211 lb (95.7 kg)   10/23/23 202 lb (91.6 kg)     Body mass index is 30.28 kg/m².      Current Outpatient Medications   Medication Sig Dispense Refill    HYDROcodone-acetaminophen (NORCO) 7.5-325 MG Oral Tab Take 1 tablet by mouth every 4 (four) hours as needed for Pain. 60 tablet 0    tiZANidine 2  MG Oral Tab Take 1 tablet (2 mg total) by mouth every 6 (six) hours as needed. 30 tablet 0    Meloxicam 15 MG Oral Tab Take 1 tablet (15 mg total) by mouth daily. 90 tablet 1    methotrexate 2.5 MG Oral Tab TAKE 9 TABLETS BY MOUTH ONCE WEEKLY. 108 tablet 1    eszopiclone 1 MG Oral Tab Take 1 tablet (1 mg total) by mouth nightly as needed. 30 tablet 0    FOLIC ACID 1 MG Oral Tab TAKE 1 TABLET BY MOUTH EVERY DAY 30 tablet 11    predniSONE 10 MG Oral Tab Take 2 tablets (20 mg total) by mouth daily. 60 tablet 3    ALPRAZolam (XANAX) 0.25 MG Oral Tab Take 1 tablet (0.25 mg total) by mouth 2 (two) times daily as needed. 60 tablet 0    Tocilizumab (ACTEMRA ACTPEN) 162 MG/0.9ML Subcutaneous Solution Auto-injector Inject 162 mg into the skin every 7 days. 4 each 5    sulfaSALAzine 500 MG Oral Tab Take 1 tablet (500 mg total) by mouth 2 (two) times daily. With meals 180 tablet 1    butalbital-acetaminophen-caffeine -40 MG Oral Tab Take 1 tablet by mouth every 4 (four) hours as needed for Pain. 30 tablet 1    Multiple Vitamins-Minerals (MULTI-VITAMIN/MINERALS) Oral Tab Take 1 tablet by mouth daily.        Past Medical History:   Diagnosis Date    Anxiety state     Brain tumor (HCC)     Rheumatoid arthritis (HCC)     Visual impairment     glasses      Past Surgical History:   Procedure Laterality Date    CHOLECYSTECTOMY      COLONOSCOPY N/A 02/17/2022    Procedure: COLONOSCOPY/ESOPHAGOGASTRODUODENOSCOPY (EGD);  Surgeon: Ottoniel Jorgensen MD;  Location: [facility] ENDOSCOPY    KNEE ARTHROSCOPY Left 2009    ACL/MCL repair     KNEE REPLACEMENT SURGERY Left 08/2018    OTHER  10/13/2021    SUBOCCIPITAL CRANIECTOMY FOR TUMOR WITH NEURO NAVIGATION, MICROSCOPE DISSECTION AND NEURO MONITORING    ROTATOR CUFF REPAIR Left 11/10/2023    SHOULDER REPAIR      Family History   Problem Relation Age of Onset    No Known Problems Father     No Known Problems Mother     No Known Problems Daughter     No Known Problems Son     No Known Problems  Maternal Grandmother     No Known Problems Maternal Grandfather     No Known Problems Paternal Grandmother     No Known Problems Paternal Grandfather     No Known Problems Sister     No Known Problems Brother     No Known Problems Other    no arthritis, 1 sister,    Social History:  Social History     Socioeconomic History    Marital status:    Tobacco Use    Smoking status: Never    Smokeless tobacco: Never   Vaping Use    Vaping Use: Never used   Substance and Sexual Activity    Alcohol use: Not Currently     Comment: rare    Drug use: No   Other Topics Concern    Caffeine Concern No    Exercise No    Seat Belt No    Special Diet No    Stress Concern No    Weight Concern No      , 1 year old   Manager at [employer]     REVIEW OF SYSTEMS:   Review Of Systems:  Fatigue  Constitutional:No fever, no change in weight or appetitie  Derm: No rashes, no oral ulcers, no alopecia, no photosensitivity, no psoriasis  HEENT: No dry eyes, no dry mouth, no Raynaud's, no nasal ulcers, no parotid swelling, occ - neck pain, no jaw pain, no temple pain  Eyes: No visual changes,   CVS: No chest pain, no heart disease  RS: No SOB, no Cough, No Pleurtic pain,   GI: No nausea, no vomiiting, no abominal pain, no hx of ulcer, no gastritis, no heartburn, no dyshpagia, no BRBPR or melena  He has a lot of pain with eating bad food - he doesn't have a good diet. He's having his gall bladder checked out.   : no dysuria, ,   Neuro: No numbness or tingling, gets migraines often - since age 18 he gets  headache, no hx of seizures,   Psych: no hx of anxiety or depression  ENDO: no hx of thyroid disease, no hx of DM  Joint/Muscluskeltal: see HPI,   All other ROS are negative.     EXAM:   /60   Pulse 75   Resp 17   Ht 5' 10\" (1.778 m)   Wt 211 lb (95.7 kg)   BMI 30.28 kg/m²   HEENT:  EOM intact, clear sclear, PERRLA, pleasant, no acute distress, no CAD, no neck tendnerness, good ROM,   No rashes  Thin cachectic   CVS: RRR, no  murmurs  RS: CTAB, no crackles, no rhonchi  ABD: Soft Non tender, greenish bluish dicloration over abdomen   No hepatomegaly since -   No abdominal tendenress   Joint exam   Can't close his hands like his left 1st aand 5th fingers. Arlet his let 3rd finger - , better in right hand.   Tender more oin left 2nd and 3rd mcps with chronic swelling.   Tender in left 4th pip joint with flexion   Chronic +1-2swelling in right 2nd and 3rd mcps with tenderness and left+1 2nd and 3rd mcps with tenerness - improving   +chronic swelling in 3-5th mcps and 1-5th pips - improved.   Ulnar deviation of right 5th finger   Ulnar deviation of left 2nd finger noted with dip joitn changes  Left wrist mild +1 swelling with fusion   Right wrist mild -swellign , almost ankyloised.   B/l elbows tender, arlet left elbows.  - with 15- 30 degree flexion deformities in left elbow   Left shoulder resplaced - improved abduction.   Right knee mild  Tender, mild   swleling - this has improved   Left knee +1 Swellig. , tender   No neck tenderness at this time.   Right ankle mild +1 swelling and tender   bl feet squeeze test positive   Right hip not tender - decreased ext rotation by 15 degrees   No edema    Component      Latest Ref Rng & Units 8/31/2020   Glucose      65 - 99 mg/dL 108 (H)   BUN      7 - 25 mg/dL 10   CREATININE      0.60 - 1.35 mg/dL 0.84   eGFR NON-AFR. AMERICAN      > OR = 60 mL/min/1.73m2 117   eGFR AFRICAN AMERICAN      > OR = 60 mL/min/1.73m2 135   Bun/Creatinine Ratio      6 - 22 (calc) NOT APPLICABLE   SODIUM, SERUM      135 - 146 mmol/L 141   POTASSIUM, SERUM      3.5 - 5.3 mmol/L 3.7   CHLORIDE, SERUM      98 - 110 mmol/L 101   CARBON DIOXIDE      20 - 32 mmol/L 32   CALCIUM      8.6 - 10.3 mg/dL 9.7   PROTEIN, TOTAL      6.1 - 8.1 g/dL 7.4   Albumin      3.6 - 5.1 g/dL 4.5   Globulin, Total      1.9 - 3.7 g/dL (calc) 2.9   ALBUMIN/GLOBULIN RATIO      1.0 - 2.5 (calc) 1.6   Total Bilirubin      0.2 - 1.2 mg/dL 0.3   Alkaline  Phosphatase      36 - 130 U/L 114   AST      10 - 40 U/L 16   ALT (SGPT)      9 - 46 U/L 20   WHITE BLOOD CELL COUNT      3.8 - 10.8 Thousand/uL 9.6   RED BLOOD CELL COUNT      4.20 - 5.80 Million/uL 4.46   Hemoglobin      13.2 - 17.1 g/dL 14.4   Hematocrit      38.5 - 50.0 % 41.4   MCV      80.0 - 100.0 fL 92.8   MCH      27.0 - 33.0 pg 32.3   MCHC      32.0 - 36.0 g/dL 34.8   RDW      11.0 - 15.0 % 11.9   Platelet Count      140 - 400 Thousand/uL 238   MPV      7.5 - 12.5 fL 11.7   QUANTIFERON(R)-TB GOLD PLUS, 1 TUBE      NEGATIVE NEGATIVE   NIL      IU/mL 0.02   MITOGEN-NIL      IU/mL >10.00   TB1-NIL      IU/mL 0.00   TB2-NIL      IU/mL 0.00   HEPATITIS C ANTIBODY      NON-REACTIVE NON-REACTIVE   SIGNAL TO CUT-OFF      <1.00 0.02   C-REACTIVE PROTEIN      <8.0 mg/L 10.4 (H)   SED RATE BY MODIFIED$WESTERGREN      < OR = 15 mm/h 14   EDWARD SCREEN, IFA      NEGATIVE NEGATIVE   HEPATITIS B CORE AB TOTAL      NON-REACTIVE NON-REACTIVE   HBSAg Screen      NON-REACTIVE NON-REACTIVE   RHEUMATOID FACTOR      <14 IU/mL 27 (H)   CYCLIC CITRULLINATED$PEPTIDE (CCP) AB (IGG)      UNITS 120 (H)   VITAMIN D, 25-OH, TOTAL      30 - 100 ng/mL 26 (L)       Component      Latest Ref [facility] 10/18/2023   WBC      3.8 - 10.8 Thousand/uL 10.2    RBC      4.20 - 5.80 Million/uL 4.03 (L)    Hemoglobin      13.2 - 17.1 g/dL 13.4    Hematocrit      38.5 - 50.0 % 39.5    MCV      80.0 - 100.0 fL 98.0    MCH      27.0 - 33.0 pg 33.3 (H)    MCHC      32.0 - 36.0 g/dL 33.9    RDW      11.0 - 15.0 % 11.4    Platelet Count      140 - 400 Thousand/uL 264    MPV      7.5 - 12.5 fL 11.4    Neutrophils Absolute      1,500 - 7,800 cells/uL 7,273    ABSOLUTE BAND NEUTROPHILS      0 - 750 cells/uL CANCELED    ABSOLUTE METAMYELOCYTES      0 cells/uL CANCELED    ABSOLUTE MYELOCYTES      0 cells/uL CANCELED    ABSOLUTE PROMYELOCYTES      0 cells/uL CANCELED    Lymphocytes Absolute      850 - 3,900 cells/uL 2,111    Monocytes Absolute      200 - 950 cells/uL  683    Eosinophils Absolute      15 - 500 cells/uL 71    Basophils Absolute      0 - 200 cells/uL 61    ABSOLUTE BLASTS      0 cells/uL CANCELED    ABSOLUTE NUCLEATED RBC      0 cells/uL CANCELED    Neutrophils %      % 71.3    BAND NEUTROPHILS      % CANCELED    Metamyelocytes      % CANCELED    MYELOCYTES      % CANCELED    PROMYELOCYTES      % CANCELED    Lymphocytes %      % 20.7    REACTIVE LYMPHOCYTES      0 - 10 % CANCELED    Monocytes %      % 6.7    Eosinophils %      % 0.7    Basophils %      % 0.6    BLASTS      % CANCELED    NUCLEATED RBC      0 /100 WBC CANCELED    COMMENT(S) CANCELED    Glucose      65 - 99 mg/dL 78    BUN      7 - 25 mg/dL 13    CREATININE      0.60 - 1.26 mg/dL 0.69    EGFR      > OR = 60 mL/min/1.73m2 125    BUN/CREATININE RATIO      6 - 22 (calc) SEE NOTE:    Sodium      135 - 146 mmol/L 141    Potassium      3.5 - 5.3 mmol/L 3.9    Chloride      98 - 110 mmol/L 103    Carbon Dioxide, Total      20 - 32 mmol/L 30    CALCIUM      8.6 - 10.3 mg/dL 9.2    PROTEIN, TOTAL      6.1 - 8.1 g/dL 6.8    Albumin      3.6 - 5.1 g/dL 4.2    Globulin      1.9 - 3.7 g/dL (calc) 2.6    A/G Ratio      1.0 - 2.5 (calc) 1.6    Total Bilirubin      0.2 - 1.2 mg/dL 0.5    Alkaline Phosphatase      36 - 130 U/L 89    AST (SGOT)      10 - 40 U/L 15    ALT (SGPT)      9 - 46 U/L 10       Legend:  (L) Low  (H) High    11/28/2014 - right wrist xray   1. No acute disease.  2. Old fracture fifth metacarpal.  3. Degenerative cysts lunate bone.    1/11/2014 - left hand xray   CONCLUSION:  1. Advanced arthritic changes about the wrist consistent with rheumatoid     arthritis with fusion of multiple carpal bones as well as     metacarpocarpal joints. There has been some progression of disease when     compared to June 20, 2011.  2. Some progression of disease at the first CMC joint.  3. The rest of the findings involving the interphalangeal and MCP joints     relatively stable.    1/11/2014 - chest xray    CONCLUSION:  Normal examination    4/18/218  Left knee MRI    ASSESSMENT AND PLAN:   Domingo George is a 33 year old male who presents for   Chief Complaint   Patient presents with    Rheumatoid Arthritis    Medication Follow-Up    Foot Pain     B/L    Ankle Pain     B/L     1. 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 But slowly improving -   Maintaining  actemra 162mg every  weeks. - since 8/14/2023   cont. ssz 500mg twice a day    Cont. Prednisone 10mmg a day -finally able to taper lower than 20mg!   - cont. methotrexate  9 tablets a week - and fa 1mg aday   - try adding lefluomide 10mg a day to help taper the prednisone some more   Discussed with patient risks and benefits, including hepatoxicity risk, teratogenicity -  not to get pregnant on the medication and infections risk.      - rtc in 1-2 months -    quantiferon gold 10/2022 negative. - repeat sooon  Left shoulder injectoins s/p steroid injection in 2/2023 -   Approved for disability   May 2024 - getting left eye surgery -   - b/c of flare of all joitnsmuch better on  prednisoen to 20mg a day  - will cont this for now    Weight  Loss is better t - to 119 lbs--> 140lbs --. 180lbs --> 191lbs -> 202lbs --> 211lbs.   Cont. meloxicam 15mg prn   Cont. norco prn   Labs today   rtc in 2m onths.     In the past:   Did not do better on  orencia 125mg a week since 7/2022 - but now off since 2/2023 -   He has not been able to start rinvoq - he is worried that he will get billed several thousands of dollars after 6montsh of using the copay card and had difficulty resolved a bill for $3000 after tyring to use the copay card -   Will not use rinvoq at this time.   Increasing pain was putting  off enbrel x 11 months b/c of covid and sugery but now restarted on 11/5/2021 -   . enbrel 50mg a week - restarted 11/2021 - ok to start this b/c the brain tumor was not cancerous til now 3/10/2022 - not helping at all over 4 months -   He stopped enbrel in 3/2022   He was to orencia 125mg a week - in 4/2022 - but he never started = started in 7/2022         For right knee pain   S/p right knee RA and OA on 3/2022 -   Aspirate 75cc right knee fluid     - s/p right knee injections - on 8/2021 , 3/10/2022  Try to avoid prednisone with his hx of AVN of left knee   - tried and had to stop enbrel, humira and remicade in the past   -     2. Left knee arthroplasty in 2018 - hx of AVN of left knee     3. Got covid vaccine in June 2021 -  4. 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 He has to wear glasses for 3 months. - his vision is not good b/c of this.   He is following with neuropthalmolgoist - surgery is the last option -     5. Gastroparesis - f/u GI - improving - watching his diet     6. 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 actemra 162mg every week -   2.  Cont. Methotrexate 9 tablets a week - .Cont. Folic acid 1mg a day   3. Cont. Leflunomide 10mg a day -   4. Cont.  Prednisone  10mg a day -  5.  Cont. sulfasalazine 500mg twice a day - hold 1 week before and 1 week after   6. Return to clinic in 3 months. 4/10 at 3:30 pm   7. meloxicam 15mg a day   8. norco as needed   9. Check labs today  and then every 3months    Plan for stress dose steroids - in May - Usual preoperative steroids + 25 mg hydrocortisone at induction + 100 mg hydrocortisone/day for 2-3 days Resume normal oral therapy post op or getting discharged of pred 10mg a day     Israel Hyatt MD  1/29/2024   2:42 PM

## 2024-03-23 ENCOUNTER — APPOINTMENT (OUTPATIENT)
Dept: GENERAL RADIOLOGY | Facility: HOSPITAL | Age: 34
End: 2024-03-23
Payer: COMMERCIAL

## 2024-03-23 ENCOUNTER — HOSPITAL ENCOUNTER (EMERGENCY)
Facility: HOSPITAL | Age: 34
Discharge: HOME | End: 2024-03-23
Attending: EMERGENCY MEDICINE
Payer: COMMERCIAL

## 2024-03-23 VITALS
RESPIRATION RATE: 20 BRPM | OXYGEN SATURATION: 95 % | SYSTOLIC BLOOD PRESSURE: 134 MMHG | TEMPERATURE: 99 F | HEART RATE: 87 BPM | BODY MASS INDEX: 29 KG/M2 | WEIGHT: 199.31 LBS | DIASTOLIC BLOOD PRESSURE: 77 MMHG

## 2024-03-23 DIAGNOSIS — J11.1 INFLUENZA: Primary | ICD-10-CM

## 2024-03-23 LAB
ALBUMIN SERPL-MCNC: 4.1 G/DL (ref 3.2–4.8)
ALBUMIN/GLOB SERPL: 1.3 {RATIO} (ref 1–2)
ALP LIVER SERPL-CCNC: 95 U/L
ALT SERPL-CCNC: 27 U/L
ANION GAP SERPL CALC-SCNC: 9 MMOL/L (ref 0–18)
AST SERPL-CCNC: 45 U/L (ref ?–34)
BILIRUB SERPL-MCNC: 0.3 MG/DL (ref 0.3–1.2)
BUN BLD-MCNC: 7 MG/DL (ref 9–23)
BUN/CREAT SERPL: 9 (ref 10–20)
CALCIUM BLD-MCNC: 8.5 MG/DL (ref 8.7–10.4)
CHLORIDE SERPL-SCNC: 107 MMOL/L (ref 98–112)
CO2 SERPL-SCNC: 24 MMOL/L (ref 21–32)
CREAT BLD-MCNC: 0.78 MG/DL
EGFRCR SERPLBLD CKD-EPI 2021: 119 ML/MIN/1.73M2 (ref 60–?)
GLOBULIN PLAS-MCNC: 3.2 G/DL (ref 2.8–4.4)
GLUCOSE BLD-MCNC: 106 MG/DL (ref 70–99)
OSMOLALITY SERPL CALC.SUM OF ELEC: 288 MOSM/KG (ref 275–295)
POTASSIUM SERPL-SCNC: 3.5 MMOL/L (ref 3.5–5.1)
PROT SERPL-MCNC: 7.3 G/DL (ref 5.7–8.2)
SODIUM SERPL-SCNC: 140 MMOL/L (ref 136–145)

## 2024-03-23 PROCEDURE — 99284 EMERGENCY DEPT VISIT MOD MDM: CPT

## 2024-03-23 PROCEDURE — 96374 THER/PROPH/DIAG INJ IV PUSH: CPT

## 2024-03-23 PROCEDURE — 96361 HYDRATE IV INFUSION ADD-ON: CPT

## 2024-03-23 PROCEDURE — 96375 TX/PRO/DX INJ NEW DRUG ADDON: CPT

## 2024-03-23 PROCEDURE — 71045 X-RAY EXAM CHEST 1 VIEW: CPT

## 2024-03-23 PROCEDURE — 80053 COMPREHEN METABOLIC PANEL: CPT | Performed by: EMERGENCY MEDICINE

## 2024-03-23 PROCEDURE — 85060 BLOOD SMEAR INTERPRETATION: CPT

## 2024-03-23 PROCEDURE — 85025 COMPLETE CBC W/AUTO DIFF WBC: CPT | Performed by: EMERGENCY MEDICINE

## 2024-03-23 PROCEDURE — 85025 COMPLETE CBC W/AUTO DIFF WBC: CPT

## 2024-03-23 PROCEDURE — 80053 COMPREHEN METABOLIC PANEL: CPT

## 2024-03-23 PROCEDURE — C9113 INJ PANTOPRAZOLE SODIUM, VIA: HCPCS | Performed by: EMERGENCY MEDICINE

## 2024-03-23 PROCEDURE — 85060 BLOOD SMEAR INTERPRETATION: CPT | Performed by: EMERGENCY MEDICINE

## 2024-03-23 RX ORDER — PROCHLORPERAZINE EDISYLATE 5 MG/ML
10 INJECTION INTRAMUSCULAR; INTRAVENOUS ONCE
Status: COMPLETED | OUTPATIENT
Start: 2024-03-23 | End: 2024-03-23

## 2024-03-23 RX ORDER — PANTOPRAZOLE SODIUM 40 MG/1
40 TABLET, DELAYED RELEASE ORAL DAILY
Qty: 30 TABLET | Refills: 0 | Status: SHIPPED | OUTPATIENT
Start: 2024-03-23 | End: 2024-04-22

## 2024-03-23 RX ORDER — PROCHLORPERAZINE MALEATE 10 MG
10 TABLET ORAL EVERY 6 HOURS PRN
Qty: 20 TABLET | Refills: 0 | Status: SHIPPED | OUTPATIENT
Start: 2024-03-23 | End: 2024-03-28

## 2024-03-23 RX ORDER — KETOROLAC TROMETHAMINE 15 MG/ML
15 INJECTION, SOLUTION INTRAMUSCULAR; INTRAVENOUS ONCE
Status: COMPLETED | OUTPATIENT
Start: 2024-03-23 | End: 2024-03-23

## 2024-03-23 RX ORDER — KETOROLAC TROMETHAMINE 10 MG/1
10 TABLET, FILM COATED ORAL EVERY 6 HOURS PRN
Qty: 20 TABLET | Refills: 0 | Status: SHIPPED | OUTPATIENT
Start: 2024-03-23 | End: 2024-03-28

## 2024-03-23 NOTE — ED INITIAL ASSESSMENT (HPI)
Patient is flu positive, here for inability to keep fluids down from immediate care. Symptoms have been going on for 1 week.

## 2024-03-24 NOTE — ED PROVIDER NOTES
Patient Seen in: [facility] Emergency Department    History     Chief Complaint   Patient presents with    Flu     Stated Complaint: Flu-like symptoms     HPI    33-year-old male with past medical history of rheumatoid arthritis on methotrexate/meloxicam/hydrocodone presenting with mother for evaluation with 1 week of generalized malaise/myalgias associate with fevers and cough in addition to vomiting/diarrhea with generalized weakness.  Son ill with similar, no other sick contacts recent travel; patient currently presenting secondary to nausea/vomiting and decreased/minimal oral intake.    Past Medical History:   Diagnosis Date    Anxiety state     Brain tumor (HCC)     Rheumatoid arthritis (HCC)     Visual impairment     glasses       Past Surgical History:   Procedure Laterality Date    CHOLECYSTECTOMY      COLONOSCOPY N/A 02/17/2022    Procedure: COLONOSCOPY/ESOPHAGOGASTRODUODENOSCOPY (EGD);  Surgeon: Ottoniel Jorgensen MD;  Location: [facility] ENDOSCOPY    KNEE ARTHROSCOPY Left 2009    ACL/MCL repair     KNEE REPLACEMENT SURGERY Left 08/2018    OTHER  10/13/2021    SUBOCCIPITAL CRANIECTOMY FOR TUMOR WITH NEURO NAVIGATION, MICROSCOPE DISSECTION AND NEURO MONITORING    ROTATOR CUFF REPAIR Left 11/10/2023    SHOULDER REPAIR            Family History   Problem Relation Age of Onset    No Known Problems Father     No Known Problems Mother     No Known Problems Daughter     No Known Problems Son     No Known Problems Maternal Grandmother     No Known Problems Maternal Grandfather     No Known Problems Paternal Grandmother     No Known Problems Paternal Grandfather     No Known Problems Sister     No Known Problems Brother     No Known Problems Other        Social History     Socioeconomic History    Marital status:    Tobacco Use    Smoking status: Never    Smokeless tobacco: Never   Vaping Use    Vaping Use: Never used   Substance and Sexual Activity    Alcohol use: Not Currently     Comment: rare    Drug use:  No   Other Topics Concern    Caffeine Concern No    Exercise No    Seat Belt No    Special Diet No    Stress Concern No    Weight Concern No       Review of Systems :  Constitutional: As per HPI  Respiratory: (+) cough.  Cardiovascular: Negative for chest pain and palpitations.   Gastrointestinal: (+) vomiting/diarrhea.    Positive for stated complaint: Flu-like symptoms  Other systems are as noted in HPI.  Constitutional and vital signs reviewed.      All other systems reviewed and negative except as noted above.    PSFH elements reviewed from today and agreed except as otherwise stated in HPI.    Physical Exam     ED Triage Vitals [03/23/24 1844]   /73   Pulse 91   Resp 20   Temp 99.4 °F (37.4 °C)   Temp src Oral   SpO2 97 %   O2 Device        Current:/73   Pulse 91   Temp 99.4 °F (37.4 °C) (Oral)   Resp 20   Wt 90.4 kg   SpO2 97%   BMI 28.60 kg/m²         Physical Exam   Constitutional: No distress.  Nontoxic.  HEENT: Dry MM.  Head: Normocephalic.   Eyes: No injection. No photophobia.  Neck: Neck supple.  No meningismus.  Cardiovascular: RRR.   Pulmonary/Chest: Effort normal. CTAB.  Abdominal: Soft.  Nontender.  Musculoskeletal: No gross deformity.  Neurological: Alert.   Skin: Skin is warm.   Psychiatric: Cooperative.  Nursing note and vitals reviewed.        ED Course     Labs Reviewed   COMP METABOLIC PANEL (14) - Abnormal; Notable for the following components:       Result Value    Glucose 106 (*)     BUN 7 (*)     BUN/CREA Ratio 9.0 (*)     Calcium, Total 8.5 (*)     AST 45 (*)     All other components within normal limits   CBC W/ DIFFERENTIAL - Abnormal; Notable for the following components:    WBC 2.5 (*)     RBC 4.14 (*)     HGB 12.9 (*)     HCT 37.8 (*)     .0 (*)     Neutrophil Absolute Prelim 0.99 (*)     All other components within normal limits   CBC WITH DIFFERENTIAL WITH PLATELET    Narrative:     The following orders were created for panel order CBC With Differential With  Platelet.  Procedure                               Abnormality         Status                     ---------                               -----------         ------                     CBC W/ DIFFERENTIAL[[id number]]          Abnormal            Preliminary result           Please view results for these tests on the individual orders.   MD BLOOD SMEAR CONSULT   RAINBOW DRAW LAVENDER   RAINBOW DRAW LIGHT GREEN   RAINBOW DRAW GOLD     XR CHEST AP PORTABLE  (CPT=71045)    Result Date: 3/23/2024  PROCEDURE: XR CHEST AP PORTABLE  (CPT=71045) TIME: 915 hours.   COMPARISON: [facility] [place] [facility], XR CHEST PA + LAT CHEST (CPT=71046), 10/18/2023, 12:17 PM.  [facility], X CHEST PA LAT ROUTINE, 1/11/2014, 9:06 AM.  [facility], X CHEST PA LAT ROUTINE, 6/20/2011, 12:58 PM.  INDICATIONS: Shortness of breath, retrosternal chest pain, and fever today.  TECHNIQUE:   Single view.   FINDINGS:  DEVICES: None.  CARDIOMEDIASTINAL: The cardiomediastinal silhouette is within normal limits for size. HILAR: The hilar contours are unremarkable. LUNGS/PLEURA: There is no focal consolidation.  The pleural spaces are clear. BONES: Partially imaged left shoulder arthroplasty. OTHER: None.           CONCLUSION:  No radiographic evidence of acute cardiopulmonary process.    Dictated by (CST): Yoana Peng MD on 3/23/2024 at 7:33 PM     Finalized by (CST): Yoana Peng MD on 3/23/2024 at 7:34 PM           ED Course as of 03/23/24 2121  ------------------------------------------------------------  Time: 03/23 2107  Comment: Resting comfortably feeling \"a lot better,\" labs/CXR nonacute.       MDM   DIFFERENTIAL DIAGNOSIS: After history and physical exam differential diagnosis includes but is not limited to KAYE, dehydration, pneumonia, pneumothorax, pleural effusion/empyema.    Pulse ox: 97%:Normal on RA, as independently interpreted by myself    Medical Decision  Making  Evaluation for nausea/vomiting/diarrhea in setting of known influenza with recent/minimal oral intake.  Labs nonacute, chest x-ray unremarkable as no cough blood profound symptomatic improvement after antiemetics/ketorolac and IV fluids - stable for discharge with symptomatic care and ongoing outpatient followup.    Problems Addressed:  Influenza: acute illness or injury    Amount and/or Complexity of Data Reviewed  Labs: ordered. Decision-making details documented in ED Course.  Radiology: ordered and independent interpretation performed. Decision-making details documented in ED Course.     Details: CXR without obvious pneumothorax as independently interpreted by myself    Risk  Prescription drug management.        I was wearing at minimum a facemask and eye protection throughout this encounter with handwashing performed prior and after patient evaluation without personal hand/facial/oropharyngeal contact and gloves worn throughout encounter. See note and/or contact this provider for further PPE details.      Disposition and Plan     Clinical Impression:  1. Influenza        Disposition:  Discharge    Follow-up:  Terrell Covarrubias MD  [address]  [place] [address]  [phone]    Call  For followup and re-evaluation.      Medications Prescribed:  Discharge Medication List as of 3/23/2024  9:11 PM        START taking these medications    Details   prochlorperazine (COMPAZINE) 10 mg tablet Take 1 tablet (10 mg total) by mouth every 6 (six) hours as needed for Nausea or vomiting., Normal, Disp-20 tablet, R-0      pantoprazole 40 MG Oral Tab EC Take 1 tablet (40 mg total) by mouth daily., Normal, Disp-30 tablet, R-0      Ketorolac Tromethamine 10 MG Oral Tab Take 1 tablet (10 mg total) by mouth every 6 (six) hours as needed for Pain., Normal, Disp-20 tablet, R-0

## 2024-03-25 LAB
BASOPHILS # BLD AUTO: 0.01 X10(3) UL (ref 0–0.2)
BASOPHILS NFR BLD AUTO: 0.4 %
DEPRECATED RDW RBC AUTO: 43.7 FL (ref 35.1–46.3)
EOSINOPHIL # BLD AUTO: 0.01 X10(3) UL (ref 0–0.7)
EOSINOPHIL NFR BLD AUTO: 0.4 %
ERYTHROCYTE [DISTWIDTH] IN BLOOD BY AUTOMATED COUNT: 13.2 % (ref 11–15)
HCT VFR BLD AUTO: 37.8 %
HGB BLD-MCNC: 12.9 G/DL
IMM GRANULOCYTES # BLD AUTO: 0.01 X10(3) UL (ref 0–1)
IMM GRANULOCYTES NFR BLD: 0.4 %
LYMPHOCYTES # BLD AUTO: 1.13 X10(3) UL (ref 1–4)
LYMPHOCYTES NFR BLD AUTO: 45.9 %
MCH RBC QN AUTO: 31.2 PG (ref 26–34)
MCHC RBC AUTO-ENTMCNC: 34.1 G/DL (ref 31–37)
MCV RBC AUTO: 91.3 FL
MONOCYTES # BLD AUTO: 0.31 X10(3) UL (ref 0.1–1)
MONOCYTES NFR BLD AUTO: 12.6 %
NEUTROPHILS # BLD AUTO: 0.99 X10 (3) UL (ref 1.5–7.7)
NEUTROPHILS # BLD AUTO: 0.99 X10(3) UL (ref 1.5–7.7)
NEUTROPHILS NFR BLD AUTO: 40.3 %
PLATELET # BLD AUTO: 118 10(3)UL (ref 150–450)
PLATELETS.RETICULATED NFR BLD AUTO: 5.1 % (ref 0–7)
RBC # BLD AUTO: 4.14 X10(6)UL
WBC # BLD AUTO: 2.5 X10(3) UL (ref 4–11)

## 2024-04-01 NOTE — TELEPHONE ENCOUNTER
Requested Prescriptions     Pending Prescriptions Disp Refills    methotrexate 2.5 MG Oral Tab 108 tablet 1     Sig: TAKE 9 TABLETS BY MOUTH ONCE WEEKLY.     LOV: 1/29/24  Future Appointments   Date Time Provider Department Center   4/1/2024 12:20 PM Fauzia Oseguera APRN [facility]   4/10/2024  3:30 PM Israel Hyatt MD [facility]   4/16/2024 11:00 AM Fauzia Oseguera APRN [facility]   5/24/2024 10:00 AM Fauzia Oseguera APRN [facility]     Labs:   Component      Latest Ref Rng 3/23/2024   WBC      4.0 - 11.0 x10(3) uL 2.5 (L)    RBC      4.30 - 5.70 x10(6)uL 4.14 (L)    Hemoglobin      13.0 - 17.5 g/dL 12.9 (L)    Hematocrit      39.0 - 53.0 % 37.8 (L)    MCV      80.0 - 100.0 fL 91.3    MCH      26.0 - 34.0 pg 31.2    MCHC      31.0 - 37.0 g/dL 34.1    RDW-SD      35.1 - 46.3 fL 43.7    RDW      11.0 - 15.0 % 13.2    Platelet Count      150.0 - 450.0 10(3)uL 118.0 (L)    Immature Platelet Fraction      0.0 - 7.0 % 5.1    Prelim Neutrophil Abs      1.50 - 7.70 x10 (3) uL 0.99 (L)    Neutrophils Absolute      1.50 - 7.70 x10(3) uL 0.99 (L)    Lymphocytes Absolute      1.00 - 4.00 x10(3) uL 1.13    Monocytes Absolute      0.10 - 1.00 x10(3) uL 0.31    Eosinophils Absolute      0.00 - 0.70 x10(3) uL 0.01    Basophils Absolute      0.00 - 0.20 x10(3) uL 0.01    Immature Granulocyte Absolute      0.00 - 1.00 x10(3) uL 0.01    Neutrophils %      % 40.3    Lymphocytes %      % 45.9    Monocytes %      % 12.6    Eosinophils %      % 0.4    Basophils %      % 0.4    Immature Granulocyte %      % 0.4    Glucose      70 - 99 mg/dL 106 (H)    Sodium      136 - 145 mmol/L 140    Potassium      3.5 - 5.1 mmol/L 3.5    Chloride      98 - 112 mmol/L 107    Carbon Dioxide, Total      21.0 - 32.0 mmol/L 24.0    ANION GAP      0 - 18 mmol/L 9    BUN      9 - 23 mg/dL 7 (L)    CREATININE      0.70 - 1.30 mg/dL 0.78    BUN/CREATININE RATIO      10.0 - 20.0  9.0 (L)    CALCIUM      8.7 - 10.4 mg/dL 8.5  (L)    CALCULATED OSMOLALITY      275 - 295 mOsm/kg 288    EGFR      >=60 mL/min/1.73m2 119    ALT (SGPT)      10 - 49 U/L 27    AST (SGOT)      <=34 U/L 45 (H)    ALKALINE PHOSPHATASE      45 - 117 U/L 95    Total Bilirubin      0.3 - 1.2 mg/dL 0.3    PROTEIN, TOTAL      5.7 - 8.2 g/dL 7.3    Albumin      3.2 - 4.8 g/dL 4.1    Globulin      2.8 - 4.4 g/dL 3.2    A/G Ratio      1.0 - 2.0  1.3       Component      Latest Ref Rn 8/14/2023   WBC      4.0 - 11.0 x10(3) uL 10.0    RBC      4.30 - 5.70 x10(6)uL 4.20 (L)    Hemoglobin      13.0 - 17.5 g/dL 14.0    Hematocrit      39.0 - 53.0 % 42.6    MCV      80.0 - 100.0 fL 101.4 (H)    MCH      26.0 - 34.0 pg 33.3    MCHC      31.0 - 37.0 g/dL 32.9    RDW      11.0 - 15.0 % 12.1    RDW-SD      35.1 - 46.3 fL 44.8    Platelet Count      150.0 - 450.0 10(3)uL 223.0    CREATININE      0.70 - 1.30 mg/dL 0.79    EGFR      >=60 mL/min/1.73m2 120    AST (SGOT)      15 - 37 U/L 14 (L)    ALT (SGPT)      16 - 61 U/L 19    C-REACTIVE PROTEIN      <0.30 mg/dL 0.43 (H)    SED RATE      0 - 15 mm/Hr 11       Legend:  (L) Low  (H) High         Summary:  1   cont. actemra 162mg every week -   2.  Cont. Methotrexate 9 tablets a week - .Cont. Folic acid 1mg a day   3. Cont. Leflunomide 10mg a day -   4. Cont.  Prednisone  10mg a day -  5.  Cont. sulfasalazine 500mg twice a day - hold 1 week before and 1 week after   6. Return to clinic in 3 months. 4/10 at 3:30 pm   7. meloxicam 15mg a day   8. norco as needed   9. Check labs today  and then every 3months     Plan for stress dose steroids - in May - Usual preoperative steroids + 25 mg hydrocortisone at induction + 100 mg hydrocortisone/day for 2-3 days Resume normal oral therapy post op or getting discharged of pred 10mg a day      Israel Hyatt MD  1/29/2024   2:42 PM

## 2024-04-02 RX ORDER — METHOTREXATE 2.5 MG/1
TABLET ORAL
Qty: 108 TABLET | Refills: 1 | Status: SHIPPED | OUTPATIENT
Start: 2024-04-02

## 2024-04-10 ENCOUNTER — OFFICE VISIT (OUTPATIENT)
Dept: RHEUMATOLOGY | Facility: CLINIC | Age: 34
End: 2024-04-10

## 2024-04-10 ENCOUNTER — TELEPHONE (OUTPATIENT)
Dept: RHEUMATOLOGY | Facility: CLINIC | Age: 34
End: 2024-04-10

## 2024-04-10 VITALS
SYSTOLIC BLOOD PRESSURE: 115 MMHG | DIASTOLIC BLOOD PRESSURE: 62 MMHG | HEIGHT: 70 IN | BODY MASS INDEX: 28.8 KG/M2 | WEIGHT: 201.19 LBS | RESPIRATION RATE: 16 BRPM | HEART RATE: 77 BPM

## 2024-04-10 DIAGNOSIS — Z51.81 THERAPEUTIC DRUG MONITORING: ICD-10-CM

## 2024-04-10 DIAGNOSIS — M06.9 RHEUMATOID ARTHRITIS, INVOLVING UNSPECIFIED SITE, UNSPECIFIED WHETHER RHEUMATOID FACTOR PRESENT (HCC): Primary | ICD-10-CM

## 2024-04-10 PROCEDURE — 99214 OFFICE O/P EST MOD 30 MIN: CPT | Performed by: INTERNAL MEDICINE

## 2024-04-10 RX ORDER — LEFLUNOMIDE 10 MG/1
10 TABLET ORAL DAILY
Qty: 90 TABLET | Refills: 1 | Status: SHIPPED | OUTPATIENT
Start: 2024-04-10

## 2024-04-10 NOTE — PROGRESS NOTES
Domingo George is a 33 year old male who presents for   Chief Complaint   Patient presents with    Medication Follow-Up    Lab Results    Rheumatoid Arthritis    Shoulder Pain     Left    Elbow Pain     B/L    Foot Pain     B/L   .   HPI:     I had the pleasure of seeing Domingo George on 2/15/2020 for evaluation.    He is a pleasant 29 year old who had seropositive RA - since age 14. His rheumatologist is retiring and he is re-establishing care.   He was diagnosed in [place]. He started getting pain and swellign in his left knee. His other joitns - his left wrist is gradaully affected. And his 5th fingers - he can't bend now.   He is on methotrexate 20mg a week, fa and [id number] a day.   He has taken prednisone as needed. He still gets swelling around his left knee. He can walk with the left knee replacement and still gets swelling.   He had torn his ACL and MCL in his lef tknee prior ot that. Then on MRI of his left knee in 2018 he was found to have AVN.     He used to be on Enbrel but couldn't stay on it b/c of the cost. It's been a year since he was on it.  He has new insurance now.   He  started on humira when he was first diagnosed but it was too expensive. He also would get injection site burning senstaion.  But then he also was on iv medication. He can't remember if it was remicade. Overall it became  Expensive. Then he was on enbrel for around 4 years around age 19. . Then around 24 he started having issues with being on biologics b/c of the expensive of the medication. It's been off and on since.   He hasn't seen dr. martin since after his left knee surgery. It;s been a long recovery for him. He hasn't been back to see her. He just coudnlt' seen her in the hours she had.     He has gradually increased back pain. When his back acts up he gets shortness of breath. If hes' lifting or bending, his back can get tight. Not much pain when laying down. 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   His right knee on occasion can hurt. And occl his elbows. occl his right ankle bothers him. No hip pain.   He has pain in his upper back.     He's not been on any other medication.   His eyes have gotten red and hurt. He has never seen an eye doctor. He's going to see an eye doctor.   No hx of psoriasis.     He sees dr. velazquez for a few years. He has been filling the methotrexate.   He feels some days are better than others.   He mainly has pain in his elbows, right knee and occl his hands.       In 2018, he had left knee arthroplasty with Dr. Schmitt.     6/1/2020  He forgot to get labs - he is going to try to goto tomorrow.   He misplaced the orders. He still wants to try enbrel  So he is just on methotrexate 20mg a week and meloxicam. He feels pretty good. He has his ups and downs.   He has about 2-3/10.   His right knee and his hands hurt a litlte bit.   It's manageable. He feels the weather fluctuations flare him.   No flares.   He's had a few flares - little ones.   He has about 1 hours of morning stiffness   His left side of neck gets stiff in the hims  He has mild elbow pain as well.   He has a lot of stress with work - and he works for [employer]     9/1/2020   He just had his labs done yesterday at [facility] - it was in [place] -   The methotrexate he's good. He feels pretty good and he has goo and bad days. He feels his pain is 2/10. He feels the weather and what he is doing matters.   He still wants to start enbrel. He hasn't started yet.   He feels still 1 hour of morning stiffnes. He feels sitting for long period times .   He feels latealy his elbows are acting up more often and his wrists.   He has limitation in range of mtion of his joitns.   He has noticed his neck at the base  - it hurts more often. He has migraines so doesn't know if it's 'related.   He has good rom. It acts up now and then.   He takes pain meds - but limits it if the pain is really bad.     2/20/2021  He waited to start enbrel  b/c of covid.   His wife got covid in 12/2020 and he thinks hemight have had it.   He is feeling worse and worse by the day.   He is having about 5/10 pain. It' snot good.   He has pain on his left shoulder , hands , feet , ankles and right knee.   He has left elbow pain. He can't straighten that.     5/15/2021  He has 5/10 pain . He has a lot of discomfrot an pain.   He is expecting his 2nd baby in 11/10/2021   His feet , ankles and right knee hurt pretty bad. He saw dr. 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   He has pian in his hands, left shouldera and right hip.   He is not started the enbrel since last time. He's not gotten a call.   He did change insurance at the beginning of the year nad it looks like the script [facility].   He is truding along.     8/21/2021  He's not started the enbrel yet.   He states he didn't get a phone call. He also doesn't check my chart.   He is losing weight and appetitis. If he eats then he has diarrhea or vomitting.   He didn't do his labs.   He has bad left shoulder and ankle pain.   He is having 8/10 pain.   The kenalog shot helped for 1 week, then the medrol santiago helped for 1 week.     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     He had bad falre up in rehab. He couldn't walk b/c of his arthrits.   He had started prednisone in end of October. Now at home he's on prednisone 5mg a day.   He was given trexall 2 tablets - 10mg each - 20mg every week -   He last took 2 weeks ago. He missed this Saturday.   The flares up s were better til last week.   He's been on enbrel in beg of 11/2021  No trouble with the shots.   The pain gets to 7/10 pain. - feet, left knee, left hand and shoulder.     He's done with physical therapy - on 11/24/2021 -   hes' walking with a walker - using it mostly for balance.   It's not the pain. He's praciit=icing      3/10/2022  hes' having right knee swellign and it hurts and his hands hurt.   He cant' use his left shoulder.   He ahs 10/10 pain.   He actually feels worse than his last visit.   He's in a wheel chair to get around.   He has lost a lot of weight. He's vomitting after eating.   He doesn't feel the medication is making him nauseaous. He has lost 73 pouns -   He had a colonsocopy and EGD - and he's throwing up all the time. He's doing this since last year.   Removing the tumor did not help.   He took the medrol santiago and that helps him efor tat week.     7/5/2022  He hasn't been doing orencia b/c he was told it was denied.   He was dx with gastropereisis and had cholecystecomy.   evern since it's removed, he is able to be removed but it's still hurting.   It's not hurting.   He's vomitting - still post cck, it's not daily.   He throws up a couple of ton a week     He has ongoing right hip pain - right hsoulder and right wrist pain.   He has the major area of pain.   The right knee has been ok -   He's on prednison e10mg a day - , methtorexate 8 tablets a week   Off enbrel since 3/2022 -     No fevers.   Post surgery he has no pain.   All his pain is joint pain.     He's taking this norco post surgery - he's taking 1 daily -   [id number] - breaks them in half and takes 1/2 tablets.     No diarrhea -   He feels fine - he's not eating or over doing it - [facility].     He lost 15lbs.       8/8/2022  He's been taking orecia for the last 4 weeks.   He is also musa cammy prednisone 20mg ad starr.   He rhas 5-6/10 pain . He is using a cane.   Hthe pain is much better than what it was.   His eating is a lot better.   He has gained weight. He has 20 pound weight gain. He's able to keep it down.   He has no side effects with hte prednisone.   The orencia feels better than the enbrel in terms of injection.   His right hip, right wrist and right shoulder is better but still pain.   His right knee is acting up right  now.   He's trying to push himslef to exercise more.   Started orencia weekly in 7/2022 -     10/10/2022  He doesn't need a cane. He feels his weight is getting better.   His hands , feet and shoulders hurt really badyly and it's hard for him to  things now.   He has a bout 7/10 pain.   He has a hard time closing his hands.   The orencia is on for about 3 months now.   He feels the waether changing is playing a factor in more achyiness.   No stomach pain.   His stomach is better - rarely any vomitting.   He does have more pain than his last visit - thinks it's the weather.   He's still on pred 20mg a day, orencia 125mg a week and methtorexate 20mg a week.    2/1/2023  His pain is 7/10 pain. His sed rate was 71mm/hr. I increased his pred to 30mg a day    His left shoulder is really hard to lift. definietly the pain spikes up more.   He is tolerating the ssz but he's not really noticing a differnce with it.   He's not vomitting as much as he used to.   He still has balance if off. He's trying to walk without the cane. He's off physical therapy.   He can close his hands. He still can't  things well.     4/11/2023  He has been off orencia and not started rinvoq yet. He was approved but there was a snafu on using the savings card and he was billed $3000.   He has pain in his hands and feet and ankles.   He is worse off biologic at this points.   He's on methorexate 9 tablets a week and prednisone 20mg a day.   He has 7/10 pain . He has more pain off orencia.   He has gained weight.     His left shoulder injection only lasted a few weeks.   He can't move it mouch.   He needs help getting dressed.   He's applying for disaiblity.     6/12/2023   He checked with the [employer] rep and then his pharmacy - there eis nothing on his bill now.   He hasn't started rinvoq yet - he is very worried the same thing is going to happen when he gets the next month and he can't get in touch with anyone who can assure him it won't.   He  has 8/10 pain - he has alot of left shoulder wrists, and back. He has back spasms and difficulty breathing when that happenes.     He hasn't been working for 1 year - and appliying for disability   He still has issue with his gastroparesis but not as severe as it was   He's on pred 20mg a day, methotrexate 9 tablets a week and ssz 500mg bid.     8/14/2023  He got the assitance card, and he scheduled the shipment.   He is not on actemra yet - saw PA on 6/14/2023 - and he finally is getting it.   His back and left shoulder is huritng badly - his ankles and hands are hurting as well.   He is still badly having phi apin. He is walking without assitance still.   There hasn't been any change since his last viist.   His stomach is stable.   He's on pred 20mg a day, methotrexate 9 tablets a week and ssz 500mg bid.     10/23/2023  He has been on actemra - and he feels no different with it.   No side effects.   He's till on pred 20g a day.   No issues with billing.   He's having surgery on 11/10.   His walking I horrible. Hisi ankles are painful. He ha 01/10 apin.   Hi knes are finge.   Hi hands , elbows and left shoulder is bad.   He's getting surgery with dr. Clements at [facility]    1/29/2024  He had left shoulder surgery on 11/10/2023 he is doing ok with this.   His feet and ankles  and hands and left elbow is horrbile. .   He does feel the actemra is keeping it at bay.   Since novemeber he's on prednisone 10mg a day he's handling it.  His feet can hurt on walking - 7/10 pain.   Getting eye surgery in 5/2023 - to improved the double vision and misaligngment in his left eye.   No covid, no infections since his last visit.     4/10/2024  He felt he was sick with flu - and held his meds for 1 week   He did noticed it was helping with the adding the leflunomide 10mg ad ay.   He's on actemra 162mg aw [place],   The last few days his left elbows and bialteral ankles hurt a lot.   He also cut the grass yesterday  - he is sore for  a couple of days.     Approved for disability -     Wt Readings from Last 2 Encounters:   04/10/24 201 lb 3.2 oz (91.3 kg)   03/23/24 199 lb 4.7 oz (90.4 kg)     Body mass index is 28.87 kg/m².      Current Outpatient Medications   Medication Sig Dispense Refill    methotrexate 2.5 MG Oral Tab TAKE 9 TABLETS BY MOUTH ONCE WEEKLY. 108 tablet 1    HYDROcodone-acetaminophen (NORCO) 7.5-325 MG Oral Tab Take 1 tablet by mouth every 4 (four) hours as needed for Pain. 60 tablet 0    ALPRAZolam (XANAX) 0.25 MG Oral Tab Take 1 tablet (0.25 mg total) by mouth 2 (two) times daily as needed. 60 tablet 0    pantoprazole 40 MG Oral Tab EC Take 1 tablet (40 mg total) by mouth daily. 30 tablet 0    tiZANidine 2 MG Oral Tab Take 1 tablet (2 mg total) by mouth every 6 (six) hours as needed. 30 tablet 0    leflunomide 10 MG Oral Tab Take 1 tablet (10 mg total) by mouth daily. 30 tablet 3    predniSONE 10 MG Oral Tab Take 1 tablet (10 mg total) by mouth daily. 90 tablet 1    sulfaSALAzine 500 MG Oral Tab Take 1 tablet (500 mg total) by mouth 2 (two) times daily. With meals 180 tablet 1    Meloxicam 15 MG Oral Tab Take 1 tablet (15 mg total) by mouth daily. 90 tablet 1    eszopiclone 1 MG Oral Tab Take 1 tablet (1 mg total) by mouth nightly as needed. 30 tablet 0    FOLIC ACID 1 MG Oral Tab TAKE 1 TABLET BY MOUTH EVERY DAY 30 tablet 11    Tocilizumab (ACTEMRA ACTPEN) 162 MG/0.9ML Subcutaneous Solution Auto-injector Inject 162 mg into the skin every 7 days. 4 each 5    butalbital-acetaminophen-caffeine -40 MG Oral Tab Take 1 tablet by mouth every 4 (four) hours as needed for Pain. 30 tablet 1    Multiple Vitamins-Minerals (MULTI-VITAMIN/MINERALS) Oral Tab Take 1 tablet by mouth daily.        Past Medical History:    Anxiety state    Brain tumor (HCC)    Rheumatoid arthritis (HCC)    Visual impairment    glasses      Past Surgical History:   Procedure Laterality Date    Cholecystectomy      Colonoscopy N/A 02/17/2022    Procedure:  COLONOSCOPY/ESOPHAGOGASTRODUODENOSCOPY (EGD);  Surgeon: Ottoniel Jorgensen MD;  [facility] ENDOSCOPY    Knee arthroscopy Left 2009    ACL/MCL repair     Knee replacement surgery Left 08/2018    Other  10/13/2021    SUBOCCIPITAL CRANIECTOMY FOR TUMOR WITH NEURO NAVIGATION, MICROSCOPE DISSECTION AND NEURO MONITORING    Rotator cuff repair Left 11/10/2023    SHOULDER REPAIR      Family History   Problem Relation Age of Onset    No Known Problems Father     No Known Problems Mother     No Known Problems Daughter     No Known Problems Son     No Known Problems Maternal Grandmother     No Known Problems Maternal Grandfather     No Known Problems Paternal Grandmother     No Known Problems Paternal Grandfather     No Known Problems Sister     No Known Problems Brother     No Known Problems Other    no arthritis, 1 sister,    Social History:  Social History     Socioeconomic History    Marital status:    Tobacco Use    Smoking status: Never    Smokeless tobacco: Never   Vaping Use    Vaping status: Never Used   Substance and Sexual Activity    Alcohol use: Not Currently     Comment: rare    Drug use: No   Other Topics Concern    Caffeine Concern No    Exercise No    Seat Belt No    Special Diet No    Stress Concern No    Weight Concern No     Social Determinants of Health     Food Insecurity: Low Risk  (12/7/2021)    Received from [facility]    Food Insecurity     Have there been times that your food ran out, and you didn't have money to get more?: No     Are there times that you worry that this might happen?: No   Transportation Needs: Low Risk  (12/7/2021)    Received from [facility]    Transportation Needs     Do you have trouble getting transportation to medical appointments?: No   Housing Stability: Low Risk  (12/7/2021)    Received from [facility]     Housing Stability     Are you concerned about having a safe and reliable place to live?: No      , 1 year old   Manager at [employer]     REVIEW OF SYSTEMS:   Review Of Systems:  Fatigue  Constitutional:No fever, no change in weight or appetitie  Derm: No rashes, no oral ulcers, no alopecia, no photosensitivity, no psoriasis  HEENT: No dry eyes, no dry mouth, no Raynaud's, no nasal ulcers, no parotid swelling, occ - neck pain, no jaw pain, no temple pain  Eyes: No visual changes,   CVS: No chest pain, no heart disease  RS: No SOB, no Cough, No Pleurtic pain,   GI: No nausea, no vomiiting, no abominal pain, no hx of ulcer, no gastritis, no heartburn, no dyshpagia, no BRBPR or melena  He has a lot of pain with eating bad food - he doesn't have a good diet. He's having his gall bladder checked out.   : no dysuria, ,   Neuro: No numbness or tingling, gets migraines often - since age 18 he gets  headache, no hx of seizures,   Psych: no hx of anxiety or depression  ENDO: no hx of thyroid disease, no hx of DM  Joint/Muscluskeltal: see HPI,   All other ROS are negative.     EXAM:   /62   Pulse 77   Resp 16   Ht 5' 10\" (1.778 m)   Wt 201 lb 3.2 oz (91.3 kg)   BMI 28.87 kg/m²   HEENT:  EOM intact, clear sclear, PERRLA, pleasant, no acute distress, no CAD, no neck tendnerness, good ROM,   No rashes  Thin cachectic   CVS: RRR, no murmurs  RS: CTAB, no crackles, no rhonchi  ABD: Soft Non tender, greenish bluish dicloration over abdomen   No hepatomegaly since -   No abdominal tendenress   Joint exam   Can't close his hands like his left 1st aand 5th fingers. Dagoberto his let 3rd finger - , better in right hand.   Tender more oin left 2nd and 3rd mcps with chronic swelling.   Tender in left 4th pip joint with flexion   Chronic +1-2swelling in right 2nd and 3rd mcps with tenderness and left+1 2nd and 3rd mcps with tenerness - improving   +chronic swelling in 3-5th mcps and 1-5th pips - improved.   Ulnar deviation of  right 5th finger   Ulnar deviation of left 2nd finger noted with dip joitn changes  Left wrist mild +1 swelling with fusion   Right wrist mild -swellign , almost ankyloised.   B/l elbows tender, arlet left elbows.  - with 15- 30 degree flexion deformities in left elbow   Left shoulder resplaced - improved abduction.   Right knee mild  Tender, mild   swleling - this has improved   Left knee +1 [place]. , tender   No neck tenderness at this time.   Right ankle mild +1 swelling and tender   bl feet squeeze test positive   Right hip not tender - decreased ext rotation by 15 degrees   No edema    Component      Latest Ref Rng & Units 8/31/2020   Glucose      65 - 99 mg/dL 108 (H)   BUN      7 - 25 mg/dL 10   CREATININE      0.60 - 1.35 mg/dL 0.84   eGFR NON-AFR. AMERICAN      > OR = 60 mL/min/1.73m2 117   eGFR AFRICAN AMERICAN      > OR = 60 mL/min/1.73m2 135   Bun/Creatinine Ratio      6 - 22 (calc) NOT APPLICABLE   SODIUM, SERUM      135 - 146 mmol/L 141   POTASSIUM, SERUM      3.5 - 5.3 mmol/L 3.7   CHLORIDE, SERUM      98 - 110 mmol/L 101   CARBON DIOXIDE      20 - 32 mmol/L 32   CALCIUM      8.6 - 10.3 mg/dL 9.7   PROTEIN, TOTAL      6.1 - 8.1 g/dL 7.4   Albumin      3.6 - 5.1 g/dL 4.5   Globulin, Total      1.9 - 3.7 g/dL (calc) 2.9   ALBUMIN/GLOBULIN RATIO      1.0 - 2.5 (calc) 1.6   Total Bilirubin      0.2 - 1.2 mg/dL 0.3   Alkaline Phosphatase      36 - 130 U/L 114   AST      10 - 40 U/L 16   ALT (SGPT)      9 - 46 U/L 20   WHITE BLOOD CELL COUNT      3.8 - 10.8 Thousand/uL 9.6   RED BLOOD CELL COUNT      4.20 - 5.80 Million/uL 4.46   Hemoglobin      13.2 - 17.1 g/dL 14.4   Hematocrit      38.5 - 50.0 % 41.4   MCV      80.0 - 100.0 fL 92.8   MCH      27.0 - 33.0 pg 32.3   MCHC      32.0 - 36.0 g/dL 34.8   RDW      11.0 - 15.0 % 11.9   Platelet Count      140 - 400 Thousand/uL 238   MPV      7.5 - 12.5 fL 11.7   QUANTIFERON(R)-TB GOLD PLUS, 1 TUBE      NEGATIVE NEGATIVE   NIL      IU/mL 0.02   MITOGEN-NIL       IU/mL >10.00   TB1-NIL      IU/mL 0.00   TB2-NIL      IU/mL 0.00   HEPATITIS C ANTIBODY      NON-REACTIVE NON-REACTIVE   SIGNAL TO CUT-OFF      <1.00 0.02   C-REACTIVE PROTEIN      <8.0 mg/L 10.4 (H)   SED RATE BY MODIFIED$WESTERGREN      < OR = 15 mm/h 14   EDWARD SCREEN, IFA      NEGATIVE NEGATIVE   HEPATITIS B CORE AB TOTAL      NON-REACTIVE NON-REACTIVE   HBSAg Screen      NON-REACTIVE NON-REACTIVE   RHEUMATOID FACTOR      <14 IU/mL 27 (H)   CYCLIC CITRULLINATED$PEPTIDE (CCP) AB (IGG)      UNITS 120 (H)   VITAMIN D, 25-OH, TOTAL      30 - 100 ng/mL 26 (L)     Component      Latest Ref [facility] 3/23/2024   WBC      4.0 - 11.0 x10(3) uL 2.5 (L)    RBC      4.30 - 5.70 x10(6)uL 4.14 (L)    Hemoglobin      13.0 - 17.5 g/dL 12.9 (L)    Hematocrit      39.0 - 53.0 % 37.8 (L)    MCV      80.0 - 100.0 fL 91.3    MCH      26.0 - 34.0 pg 31.2    MCHC      31.0 - 37.0 g/dL 34.1    RDW-SD      35.1 - 46.3 fL 43.7    RDW      11.0 - 15.0 % 13.2    Platelet Count      150.0 - 450.0 10(3)uL 118.0 (L)    Immature Platelet Fraction      0.0 - 7.0 % 5.1    Prelim Neutrophil Abs      1.50 - 7.70 x10 (3) uL 0.99 (L)    Neutrophils Absolute      1.50 - 7.70 x10(3) uL 0.99 (L)    Lymphocytes Absolute      1.00 - 4.00 x10(3) uL 1.13    Monocytes Absolute      0.10 - 1.00 x10(3) uL 0.31    Eosinophils Absolute      0.00 - 0.70 x10(3) uL 0.01    Basophils Absolute      0.00 - 0.20 x10(3) uL 0.01    Immature Granulocyte Absolute      0.00 - 1.00 x10(3) uL 0.01    Neutrophils %      % 40.3    Lymphocytes %      % 45.9    Monocytes %      % 12.6    Eosinophils %      % 0.4    Basophils %      % 0.4    Immature Granulocyte %      % 0.4    Glucose      70 - 99 mg/dL 106 (H)    Sodium      136 - 145 mmol/L 140    Potassium      3.5 - 5.1 mmol/L 3.5    Chloride      98 - 112 mmol/L 107    Carbon Dioxide, Total      21.0 - 32.0 mmol/L 24.0    ANION GAP      0 - 18 mmol/L 9    BUN      9 - 23 mg/dL 7 (L)    CREATININE      0.70 - 1.30 mg/dL 0.78     BUN/CREATININE RATIO      10.0 - 20.0  9.0 (L)    CALCIUM      8.7 - 10.4 mg/dL 8.5 (L)    CALCULATED OSMOLALITY      275 - 295 mOsm/kg 288    EGFR      >=60 mL/min/1.73m2 119    ALT (SGPT)      10 - 49 U/L 27    AST (SGOT)      <=34 U/L 45 (H)    ALKALINE PHOSPHATASE      45 - 117 U/L 95    Total Bilirubin      0.3 - 1.2 mg/dL 0.3    PROTEIN, TOTAL      5.7 - 8.2 g/dL 7.3    Albumin      3.2 - 4.8 g/dL 4.1    Globulin      2.8 - 4.4 g/dL 3.2    A/G Ratio      1.0 - 2.0  1.3       Legend:  (L) Low  (H) High    11/28/2014 - right wrist xray   1. No acute disease.  2. Old fracture fifth metacarpal.  3. Degenerative cysts lunate bone.    1/11/2014 - left hand xray   CONCLUSION:  1. Advanced arthritic changes about the wrist consistent with rheumatoid     arthritis with fusion of multiple carpal bones as well as     metacarpocarpal joints. There has been some progression of disease when     compared to June 20, 2011.  2. Some progression of disease at the first CMC joint.  3. The rest of the findings involving the interphalangeal and MCP joints     relatively stable.    1/11/2014 - chest xray   CONCLUSION:  Normal examination    4/18/218  Left knee MRI    ASSESSMENT AND PLAN:   Domingo George is a 33 year old male who presents for   Chief Complaint   Patient presents with    Medication Follow-Up    Lab Results    Rheumatoid Arthritis    Shoulder Pain     Left    Elbow Pain     B/L    Foot Pain     B/L     1. 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 But slowly improving -   Maintaining  actemra 162mg every  weeks. - since 8/14/2023   Off  ssz 500mg twice a day  since his stomach is bad -   Cont. Prednisone 10mmg a day -finally able to taper  lower than 20mg!   - cont. methotrexate  9 tablets a week - and fa 1mg aday   - cont.  lefluomide 10mg a day to help taper the prednisone some more   Having surgery on left eye on may 2nd -       -   quantiferon gold 10/2022 negative. - repeat sooon  Left shoulder injectoins s/p steroid injection in 2/2023 -   Approved for disability   May 2024 - getting left eye surgery -   - b/c of flare of all joitnsmuch better on  prednisoen to 20mg a day  - will cont this for now    Weight  Loss is better t - to 119 lbs--> 140lbs --. 180lbs --> 191lbs -> 202lbs --> 211lbs.   Cont. meloxicam 15mg prn   Cont. norco prn   Labs today   rtc in 2m onths.     In the past:   Did not do better on  orencia 125mg a week since 7/2022 - but now off since 2/2023 -   He has not been able to start rinvoq - he is worried that he will get billed several thousands of dollars after 6montsh of using the copay card and had difficulty resolved a bill for $3000 after tyring to use the copay card -   Will not use rinvoq at this time.   Increasing pain was putting off enbrel x 11 months b/c of covid and kori but now restarted on 11/5/2021 -   . enbrel 50mg a week - restarted 11/2021 - ok to start this b/c the brain tumor was not cancerous til now 3/10/2022 - not helping at all over 4 months -   He stopped enbrel in 3/2022   He was to orencia 125mg a week - in 4/2022 - but he never started = started in 7/2022         For right knee pain   S/p right knee RA and OA on 3/2022 -   Aspirate 75cc right knee fluid     - s/p right knee injections - on 8/2021 , 3/10/2022  Try to avoid prednisone with his hx of AVN of left knee   - tried and had to stop enbrel, humira and remicade in the past   -     2. Left knee arthroplasty in 2018 - hx of AVN of left knee     3. Got covid vaccine in June 2021 -  4. 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 He has to wear glasses for 3 months. - his vision is not good b/c  of this.   He is following with neuropthalmolgoist - surgery is the last option -     5. Gastroparesis - f/u GI - improving - watching his diet     6. 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 actemra 162mg every week -   Switch to actemra 8mg/kg every 4 weeks since insurance changed - so you can cont. Actemra   2.  Cont. Methotrexate 9 tablets a week - .Cont. Folic acid 1mg a day   3. Cont. Leflunomide 10mg a day -   4. Cont.  Prednisone  10mg a day -  5. Check labs today  and then every 3months  6. Return to clinic in 3 months. July 10th at 1:50 pm   7. meloxicam 15mg a day   8. norco as needed         Israel Hyatt MD  4/10/2024   3:52 PM

## 2024-04-10 NOTE — PATIENT INSTRUCTIONS
1   cont. actemra 162mg every week -   Switch to actemra 8mg/kg every 4 weeks since insurance changed - so you can cont. Actemra   2.  Cont. Methotrexate 9 tablets a week - .Cont. Folic acid 1mg a day   3. Cont. Leflunomide 10mg a day -   4. Cont.  Prednisone  10mg a day -  5. Check labs today  and then every 3months  6. Return to clinic in 3 months. July 10th at 1:50 pm   7. meloxicam 15mg a day   8. norco as needed

## 2024-04-10 NOTE — TELEPHONE ENCOUNTER
Plan to switch actemra to 8mg/kg a month - switched insurance   He has felt the bet with actmera   Please get prior auth

## 2024-04-11 ENCOUNTER — LAB ENCOUNTER (OUTPATIENT)
Dept: LAB | Age: 34
End: 2024-04-11
Attending: INTERNAL MEDICINE
Payer: MEDICARE

## 2024-04-11 DIAGNOSIS — M06.9 RHEUMATOID ARTHRITIS, INVOLVING UNSPECIFIED SITE, UNSPECIFIED WHETHER RHEUMATOID FACTOR PRESENT (HCC): ICD-10-CM

## 2024-04-11 DIAGNOSIS — Z51.81 THERAPEUTIC DRUG MONITORING: ICD-10-CM

## 2024-04-11 LAB
ALT SERPL-CCNC: 18 U/L
AST SERPL-CCNC: 31 U/L (ref ?–34)
CREAT BLD-MCNC: 0.7 MG/DL
CRP SERPL-MCNC: 4.4 MG/DL (ref ?–1)
DEPRECATED RDW RBC AUTO: 46.3 FL (ref 35.1–46.3)
EGFRCR SERPLBLD CKD-EPI 2021: 123 ML/MIN/1.73M2 (ref 60–?)
ERYTHROCYTE [DISTWIDTH] IN BLOOD BY AUTOMATED COUNT: 13.3 % (ref 11–15)
ERYTHROCYTE [SEDIMENTATION RATE] IN BLOOD: 40 MM/HR
HCT VFR BLD AUTO: 38.2 %
HGB BLD-MCNC: 12.8 G/DL
MCH RBC QN AUTO: 31.7 PG (ref 26–34)
MCHC RBC AUTO-ENTMCNC: 33.5 G/DL (ref 31–37)
MCV RBC AUTO: 94.6 FL
PLATELET # BLD AUTO: 267 10(3)UL (ref 150–450)
RBC # BLD AUTO: 4.04 X10(6)UL
WBC # BLD AUTO: 11.8 X10(3) UL (ref 4–11)

## 2024-04-11 PROCEDURE — 85652 RBC SED RATE AUTOMATED: CPT

## 2024-04-11 PROCEDURE — 86140 C-REACTIVE PROTEIN: CPT

## 2024-04-11 PROCEDURE — 84460 ALANINE AMINO (ALT) (SGPT): CPT

## 2024-04-11 PROCEDURE — 84450 TRANSFERASE (AST) (SGOT): CPT

## 2024-04-11 PROCEDURE — 36415 COLL VENOUS BLD VENIPUNCTURE: CPT

## 2024-04-11 PROCEDURE — 85027 COMPLETE CBC AUTOMATED: CPT

## 2024-04-11 PROCEDURE — 86480 TB TEST CELL IMMUN MEASURE: CPT | Performed by: INTERNAL MEDICINE

## 2024-04-11 PROCEDURE — 82565 ASSAY OF CREATININE: CPT

## 2024-04-14 LAB
M TB IFN-G CD4+ T-CELLS BLD-ACNC: 0.09 IU/ML
M TB TUBERC IFN-G BLD QL: NEGATIVE
M TB TUBERC IGNF/MITOGEN IGNF CONTROL: 3.95 IU/ML
QFT TB1 AG MINUS NIL: -0.02 IU/ML
QFT TB2 AG MINUS NIL: -0.02 IU/ML

## 2024-04-22 NOTE — TELEPHONE ENCOUNTER
Verified coverage. Patient has Medicare primary to a [facility] [place] PPO policy.      Submitted case under the secondary today via [employer]. Case # [id number] is pending review. Clinical documentation attached to support.

## 2024-04-24 NOTE — TELEPHONE ENCOUNTER
Leslie from [employer] is calling to advise of the prior authorization of the medication:    actmera     The first two doses of the medication are approved and the remaining doses for the medication can be done at a lower level of care.     Please call back to establish the lower level of care       Call back

## 2024-06-13 ENCOUNTER — MED REC SCAN ONLY (OUTPATIENT)
Dept: RHEUMATOLOGY | Facility: CLINIC | Age: 34
End: 2024-06-13

## 2024-07-10 ENCOUNTER — OFFICE VISIT (OUTPATIENT)
Dept: RHEUMATOLOGY | Facility: CLINIC | Age: 34
End: 2024-07-10

## 2024-07-10 VITALS
HEART RATE: 80 BPM | RESPIRATION RATE: 16 BRPM | HEIGHT: 70 IN | WEIGHT: 203.69 LBS | SYSTOLIC BLOOD PRESSURE: 140 MMHG | DIASTOLIC BLOOD PRESSURE: 82 MMHG | BODY MASS INDEX: 29.16 KG/M2

## 2024-07-10 DIAGNOSIS — M06.9 RHEUMATOID ARTHRITIS, INVOLVING UNSPECIFIED SITE, UNSPECIFIED WHETHER RHEUMATOID FACTOR PRESENT (HCC): Primary | ICD-10-CM

## 2024-07-10 DIAGNOSIS — Z51.81 THERAPEUTIC DRUG MONITORING: ICD-10-CM

## 2024-07-10 PROCEDURE — G2211 COMPLEX E/M VISIT ADD ON: HCPCS | Performed by: INTERNAL MEDICINE

## 2024-07-10 PROCEDURE — 99214 OFFICE O/P EST MOD 30 MIN: CPT | Performed by: INTERNAL MEDICINE

## 2024-07-10 NOTE — PATIENT INSTRUCTIONS
1   cont. actemra 8mg/kg every 4 weeks   2.  Cont. Methotrexate 9 tablets a week - .Cont. Folic acid 1mg a day   3. Cont. Leflunomide 10mg a day -   4. Cont.  Prednisone  10mg a day -  5. Check labs netx month in 8/2024   and then every 3months  6. Return to clinic in 3 months.   7. meloxicam 15mg a day   8. Norco bid prn - 1-2 daily prn

## 2024-07-10 NOTE — PROGRESS NOTES
Domingo George is a 33 year old male who presents for   Chief Complaint   Patient presents with    Rheumatoid Arthritis    Medication Follow-Up    Lab Results   .   HPI:     I had the pleasure of seeing Domingo George on 2/15/2020 for evaluation.    He is a pleasant 29 year old who had seropositive RA - since age 14. His rheumatologist is retiring and he is re-establishing care.   He was diagnosed in [place]. He started getting pain and swellign in his left knee. His other joitns - his left wrist is gradaully affected. And his 5th fingers - he can't bend now.   He is on methotrexate 20mg a week, fa and [id number] a day.   He has taken prednisone as needed. He still gets swelling around his left knee. He can walk with the left knee replacement and still gets swelling.   He had torn his ACL and MCL in his lef tknee prior ot that. Then on MRI of his left knee in 2018 he was found to have AVN.     He used to be on Enbrel but couldn't stay on it b/c of the cost. It's been a year since he was on it.  He has new insurance now.   He  started on humira when he was first diagnosed but it was too expensive. He also would get injection site burning senstaion.  But then he also was on iv medication. He can't remember if it was remicade. Overall it became  Expensive. Then he was on enbrel for around 4 years around age 19. . Then around 24 he started having issues with being on biologics b/c of the expensive of the medication. It's been off and on since.   He hasn't seen dr. martin since after his left knee surgery. It;s been a long recovery for him. He hasn't been back to see her. He just coudnlt' seen her in the hours she had.     He has gradually increased back pain. When his back acts up he gets shortness of breath. If hes' lifting or bending, his back can get tight. Not much pain when laying down. 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   His right knee on occasion can hurt. And occl his elbows.  occl his right ankle bothers him. No hip pain.   He has pain in his upper back.     He's not been on any other medication.   His eyes have gotten red and hurt. He has never seen an eye doctor. He's going to see an eye doctor.   No hx of psoriasis.     He sees dr. velazquez for a few years. He has been filling the methotrexate.   He feels some days are better than others.   He mainly has pain in his elbows, right knee and occl his hands.       In 2018, he had left knee arthroplasty with Dr. Schmitt.     6/1/2020  He forgot to get labs - he is going to try to goto tomorrow.   He misplaced the orders. He still wants to try enbrel  So he is just on methotrexate 20mg a week and meloxicam. He feels pretty good. He has his ups and downs.   He has about 2-3/10.   His right knee and his hands hurt a litlte bit.   It's manageable. He feels the weather fluctuations flare him.   No flares.   He's had a few flares - little ones.   He has about 1 hours of morning stiffness   His left side of neck gets stiff in the hims  He has mild elbow pain as well.   He has a lot of stress with work - and he works for [employer]     9/1/2020   He just had his labs done yesterday at [facility] - it was in [place] -   The methotrexate he's good. He feels pretty good and he has goo and bad days. He feels his pain is 2/10. He feels the weather and what he is doing matters.   He still wants to start enbrel. He hasn't started yet.   He feels still 1 hour of morning stiffnes. He feels sitting for long period times .   He feels latealy his elbows are acting up more often and his wrists.   He has limitation in range of mtion of his joitns.   He has noticed his neck at the base  - it hurts more often. He has migraines so doesn't know if it's 'related.   He has good rom. It acts up now and then.   He takes pain meds - but limits it if the pain is really bad.     2/20/2021  He waited to start enbrel b/c of covid.   His wife got covid in 12/2020 and he thinks hemight  have had it.   He is feeling worse and worse by the day.   He is having about 5/10 pain. It' snot good.   He has pain on his left shoulder , hands , feet , ankles and right knee.   He has left elbow pain. He can't straighten that.     5/15/2021  He has 5/10 pain . He has a lot of discomfrot an pain.   He is expecting his 2nd baby in 11/10/2021   His feet , ankles and right knee hurt pretty bad. He saw dr. 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   He has pian in his hands, left shouldera and right hip.   He is not started the enbrel since last time. He's not gotten a call.   He did change insurance at the beginning of the year nad it looks like the script [facility].   He is truding along.     8/21/2021  He's not started the enbrel yet.   He states he didn't get a phone call. He also doesn't check my chart.   He is losing weight and appetitis. If he eats then he has diarrhea or vomitting.   He didn't do his labs.   He has bad left shoulder and ankle pain.   He is having 8/10 pain.   The kenalog shot helped for 1 week, then the medrol santiago helped for 1 week.     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     He had bad falre up in rehab. He couldn't walk b/c of his arthrits.   He had started prednisone in end of October. Now at home he's on prednisone 5mg a day.   He was given trexall 2 tablets - 10mg each - 20mg every week -   He last took 2 weeks ago. He missed this Saturday.   The flares up s were better til last week.   He's been on enbrel in beg of 11/2021  No trouble with the shots.   The pain gets to 7/10 pain. - feet, left knee, left hand and shoulder.     He's done with physical therapy - on 11/24/2021 -   hes' walking with a walker - using it mostly for balance.   It's not the pain. He's praciit=icing     3/10/2022  hes' having right knee swellign and it hurts and his  hands hurt.   He cant' use his left shoulder.   He ahs 10/10 pain.   He actually feels worse than his last visit.   He's in a wheel chair to get around.   He has lost a lot of weight. He's vomitting after eating.   He doesn't feel the medication is making him nauseaous. He has lost 73 pouns -   He had a colonsocopy and EGD - and he's throwing up all the time. He's doing this since last year.   Removing the tumor did not help.   He took the medrol santiago and that helps him efor tat week.     7/5/2022  He hasn't been doing orencia b/c he was told it was denied.   He was dx with gastropereisis and had cholecystecomy.   evern since it's removed, he is able to be removed but it's still hurting.   It's not hurting.   He's vomitting - still post cck, it's not daily.   He throws up a couple of ton a week     He has ongoing right hip pain - right hsoulder and right wrist pain.   He has the major area of pain.   The right knee has been ok -   He's on prednison e10mg a day - , methtorexate 8 tablets a week   Off enbrel since 3/2022 -     No fevers.   Post surgery he has no pain.   All his pain is joint pain.     He's taking this norco post surgery - he's taking 1 daily -   [id number] - breaks them in half and takes 1/2 tablets.     No diarrhea -   He feels fine - he's not eating or over doing it - [facility].     He lost 15lbs.       8/8/2022  He's been taking orecia for the last 4 weeks.   He is also musa cammy prednisone 20mg ad starr.   He rhas 5-6/10 pain . He is using a cane.   Hthe pain is much better than what it was.   His eating is a lot better.   He has gained weight. He has 20 pound weight gain. He's able to keep it down.   He has no side effects with hte prednisone.   The orencia feels better than the enbrel in terms of injection.   His right hip, right wrist and right shoulder is better but still pain.   His right knee is acting up right now.   He's trying to push himslef to exercise more.   Started  orencia weekly in 7/2022 -     10/10/2022  He doesn't need a cane. He feels his weight is getting better.   His hands , feet and shoulders hurt really badyly and it's hard for him to  things now.   He has a bout 7/10 pain.   He has a hard time closing his hands.   The orencia is on for about 3 months now.   He feels the waether changing is playing a factor in more achyiness.   No stomach pain.   His stomach is better - rarely any vomitting.   He does have more pain than his last visit - thinks it's the weather.   He's still on pred 20mg a day, orencia 125mg a week and methtorexate 20mg a week.    2/1/2023  His pain is 7/10 pain. His sed rate was 71mm/hr. I increased his pred to 30mg a day    His left shoulder is really hard to lift. definietly the pain spikes up more.   He is tolerating the ssz but he's not really noticing a differnce with it.   He's not vomitting as much as he used to.   He still has balance if off. He's trying to walk without the cane. He's off physical therapy.   He can close his hands. He still can't  things well.     4/11/2023  He has been off orencia and not started rinvoq yet. He was approved but there was a snafu on using the savings card and he was billed $3000.   He has pain in his hands and feet and ankles.   He is worse off biologic at this points.   He's on methorexate 9 tablets a week and prednisone 20mg a day.   He has 7/10 pain . He has more pain off orencia.   He has gained weight.     His left shoulder injection only lasted a few weeks.   He can't move it mouch.   He needs help getting dressed.   He's applying for disaiblity.     6/12/2023   He checked with the [employer] rep and then his pharmacy - there eis nothing on his bill now.   He hasn't started rinvoq yet - he is very worried the same thing is going to happen when he gets the next month and he can't get in touch with anyone who can assure him it won't.   He has 8/10 pain - he has alot of left shoulder wrists, and back.  He has back spasms and difficulty breathing when that happenes.     He hasn't been working for 1 year - and appliying for disability   He still has issue with his gastroparesis but not as severe as it was   He's on pred 20mg a day, methotrexate 9 tablets a week and ssz 500mg bid.     8/14/2023  He got the assitance card, and he scheduled the shipment.   He is not on actemra yet - saw PA on 6/14/2023 - and he finally is getting it.   His back and left shoulder is huritng badly - his ankles and hands are hurting as well.   He is still badly having phi apin. He is walking without assitance still.   There hasn't been any change since his last viist.   His stomach is stable.   He's on pred 20mg a day, methotrexate 9 tablets a week and ssz 500mg bid.     10/23/2023  He has been on actemra - and he feels no different with it.   No side effects.   He's till on pred 20g a day.   No issues with billing.   He's having surgery on 11/10.   His walking I horrible. Hisi ankles are painful. He ha 01/10 apin.   Hi knes are finge.   Hi hands , elbows and left shoulder is bad.   He's getting surgery with dr. Clements at [facility]    1/29/2024  He had left shoulder surgery on 11/10/2023 he is doing ok with this.   His feet and ankles  and hands and left elbow is horrbile. .   He does feel the actemra is keeping it at bay.   Since novemeber he's on prednisone 10mg a day he's handling it.  His feet can hurt on walking - 7/10 pain.   Getting eye surgery in 5/2023 - to improved the double vision and misaligngment in his left eye.   No covid, no infections since his last visit.     4/10/2024  He felt he was sick with flu - and held his meds for 1 week   He did noticed it was helping with the adding the leflunomide 10mg ad ay.   He's on actemra 162mg aw [place],   The last few days his left elbows and bialteral ankles hurt a lot.   He also cut the grass yesterday  - he is sore for a couple of days.     Approved for disability -      7/10/2024  He is getting the actemra - infusions now - got it 6/2024 - the infusion - and it's helping   Missed 5/2024 - injections due to insurance switch   He feels ok with it.   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      Current Outpatient Medications   Medication Sig Dispense Refill    HYDROcodone-acetaminophen (NORCO) 7.5-325 MG Oral Tab Take 1 tablet by mouth every 4 (four) hours as needed for Pain. 60 tablet 0    tiZANidine 2 MG Oral Tab Take 1 tablet (2 mg total) by mouth every 6 (six) hours as needed. 30 tablet 1    leflunomide 10 MG Oral Tab Take 1 tablet (10 mg total) by mouth daily. 90 tablet 1    methotrexate 2.5 MG Oral Tab TAKE 9 TABLETS BY MOUTH ONCE WEEKLY. 108 tablet 1    Meloxicam 15 MG Oral Tab Take 1 tablet (15 mg total) by mouth daily. 90 tablet 1    FOLIC ACID 1 MG Oral Tab TAKE 1 TABLET BY MOUTH EVERY DAY 30 tablet 11    Tocilizumab (ACTEMRA ACTPEN) 162 MG/0.9ML Subcutaneous Solution Auto-injector Inject 162 mg into the skin every 7 days. 4 each 5    butalbital-acetaminophen-caffeine -40 MG Oral Tab Take 1 tablet by mouth every 4 (four) hours as needed for Pain. 30 tablet 1    Multiple Vitamins-Minerals (MULTI-VITAMIN/MINERALS) Oral Tab Take 1 tablet by mouth daily.        Past Medical History:    Anxiety state    Brain tumor (HCC)    Rheumatoid arthritis (HCC)    Visual impairment    glasses      Past Surgical History:   Procedure Laterality Date    Cholecystectomy      Colonoscopy N/A 02/17/2022    Procedure:  COLONOSCOPY/ESOPHAGOGASTRODUODENOSCOPY (EGD);  Surgeon: Ottoniel Jorgensen MD;  [facility] ENDOSCOPY    Knee arthroscopy Left 2009    ACL/MCL repair     Knee replacement surgery Left 08/2018    Other  10/13/2021    SUBOCCIPITAL CRANIECTOMY FOR TUMOR WITH NEURO NAVIGATION, MICROSCOPE DISSECTION AND NEURO MONITORING    Rotator cuff repair Left 11/10/2023    SHOULDER REPAIR      Family History   Problem Relation Age of Onset    No Known Problems Father     No Known Problems Mother     No Known Problems Daughter     No Known Problems Son     No Known Problems Maternal Grandmother     No Known Problems Maternal Grandfather     No Known Problems Paternal Grandmother     No Known Problems Paternal Grandfather     No Known Problems Sister     No Known Problems Brother     No Known Problems Other    no arthritis, 1 sister,    Social History:  Social History     Socioeconomic History    Marital status:    Tobacco Use    Smoking status: Never    Smokeless tobacco: Never   Vaping Use    Vaping status: Never Used   Substance and Sexual Activity    Alcohol use: Not Currently     Comment: rare    Drug use: No   Other Topics Concern    Caffeine Concern No    Exercise No    Seat Belt No    Special Diet No    Stress Concern No    Weight Concern No     Social Determinants of Health     Food Insecurity: Low Risk  (12/7/2021)    Received from [facility]    Food Insecurity     Have there been times that your food ran out, and you didn't have money to get more?: No     Are there times that you worry that this might happen?: No   Transportation Needs: Low Risk  (12/7/2021)    Received from [facility]    Transportation Needs     Do you have trouble getting transportation to medical appointments?: No   Housing Stability: Low Risk  (12/7/2021)    Received from [facility]     Housing Stability     Are you concerned about having a safe and reliable place to live?: No      , 1 year old   Manager at [employer]     REVIEW OF SYSTEMS:   Review Of Systems:  Fatigue  Constitutional:No fever, no change in weight or appetitie  Derm: No rashes, no oral ulcers, no alopecia, no photosensitivity, no psoriasis  HEENT: No dry eyes, no dry mouth, no Raynaud's, no nasal ulcers, no parotid swelling, occ - neck pain, no jaw pain, no temple pain  Eyes: No visual changes,   CVS: No chest pain, no heart disease  RS: No SOB, no Cough, No Pleurtic pain,   GI: No nausea, no vomiiting, no abominal pain, no hx of ulcer, no gastritis, no heartburn, no dyshpagia, no BRBPR or melena  He has a lot of pain with eating bad food - he doesn't have a good diet. He's having his gall bladder checked out.   : no dysuria, ,   Neuro: No numbness or tingling, gets migraines often - since age 18 he gets  headache, no hx of seizures,   Psych: no hx of anxiety or depression  ENDO: no hx of thyroid disease, no hx of DM  Joint/Muscluskeltal: see HPI,   All other ROS are negative.     EXAM:   /82   Pulse 80   Resp 16   Ht 5' 10\" (1.778 m)   Wt 203 lb 11.2 oz (92.4 kg)   BMI 29.23 kg/m²   HEENT:  EOM intact, clear sclear, PERRLA, pleasant, no acute distress, no CAD, no neck tendnerness, good ROM,   No rashes  Thin cachectic   CVS: RRR, no murmurs  RS: CTAB, no crackles, no rhonchi  ABD: Soft Non tender, greenish bluish dicloration over abdomen   No hepatomegaly since -   No abdominal tendenress   Joint exam   Can't close his hands like his left 1st aand 5th fingers. Dagoberto his let 3rd finger - , better in right hand.   Tender more oin left 2nd and 3rd mcps with chronic swelling.   Tender in left 4th pip joint with flexion   Chronic +1-2swelling in right 2nd and 3rd mcps with tenderness and left+1 2nd and 3rd mcps with tenerness - improving   +chronic swelling in 3-5th mcps and 1-5th pips - improved.   Ulnar deviation of  right 5th finger   Ulnar deviation of left 2nd finger noted with dip joitn changes  Left wrist mild +1 swelling with fusion   Right wrist mild -swellign , almost ankyloised.   B/l elbows tender, arlet left elbows.  - with 15- 30 degree flexion deformities in left elbow   Left shoulder resplaced - improved abduction.   Right knee mild  Tender, mild   swleling - this has improved   Left knee +1 [place]. , tender   No neck tenderness at this time.   Right ankle mild +1 swelling and tender   bl feet squeeze test positive   Right hip not tender - decreased ext rotation by 15 degrees   No edema    Component      Latest Ref Rng & Units 8/31/2020   Glucose      65 - 99 mg/dL 108 (H)   BUN      7 - 25 mg/dL 10   CREATININE      0.60 - 1.35 mg/dL 0.84   eGFR NON-AFR. AMERICAN      > OR = 60 mL/min/1.73m2 117   eGFR AFRICAN AMERICAN      > OR = 60 mL/min/1.73m2 135   Bun/Creatinine Ratio      6 - 22 (calc) NOT APPLICABLE   SODIUM, SERUM      135 - 146 mmol/L 141   POTASSIUM, SERUM      3.5 - 5.3 mmol/L 3.7   CHLORIDE, SERUM      98 - 110 mmol/L 101   CARBON DIOXIDE      20 - 32 mmol/L 32   CALCIUM      8.6 - 10.3 mg/dL 9.7   PROTEIN, TOTAL      6.1 - 8.1 g/dL 7.4   Albumin      3.6 - 5.1 g/dL 4.5   Globulin, Total      1.9 - 3.7 g/dL (calc) 2.9   ALBUMIN/GLOBULIN RATIO      1.0 - 2.5 (calc) 1.6   Total Bilirubin      0.2 - 1.2 mg/dL 0.3   Alkaline Phosphatase      36 - 130 U/L 114   AST      10 - 40 U/L 16   ALT (SGPT)      9 - 46 U/L 20   WHITE BLOOD CELL COUNT      3.8 - 10.8 Thousand/uL 9.6   RED BLOOD CELL COUNT      4.20 - 5.80 Million/uL 4.46   Hemoglobin      13.2 - 17.1 g/dL 14.4   Hematocrit      38.5 - 50.0 % 41.4   MCV      80.0 - 100.0 fL 92.8   MCH      27.0 - 33.0 pg 32.3   MCHC      32.0 - 36.0 g/dL 34.8   RDW      11.0 - 15.0 % 11.9   Platelet Count      140 - 400 Thousand/uL 238   MPV      7.5 - 12.5 fL 11.7   QUANTIFERON(R)-TB GOLD PLUS, 1 TUBE      NEGATIVE NEGATIVE   NIL      IU/mL 0.02   MITOGEN-NIL       IU/mL >10.00   TB1-NIL      IU/mL 0.00   TB2-NIL      IU/mL 0.00   HEPATITIS C ANTIBODY      NON-REACTIVE NON-REACTIVE   SIGNAL TO CUT-OFF      <1.00 0.02   C-REACTIVE PROTEIN      <8.0 mg/L 10.4 (H)   SED RATE BY MODIFIED$WESTERGREN      < OR = 15 mm/h 14   EDWARD SCREEN, IFA      NEGATIVE NEGATIVE   HEPATITIS B CORE AB TOTAL      NON-REACTIVE NON-REACTIVE   HBSAg Screen      NON-REACTIVE NON-REACTIVE   RHEUMATOID FACTOR      <14 IU/mL 27 (H)   CYCLIC CITRULLINATED$PEPTIDE (CCP) AB (IGG)      UNITS 120 (H)   VITAMIN D, 25-OH, TOTAL      30 - 100 ng/mL 26 (L)     Component      Latest Ref [facility] 5/24/2024   WBC      3.8 - 10.8 Thousand/uL 12.2 (H)    RBC      4.20 - 5.80 Million/uL 4.08 (L)    Hemoglobin      13.2 - 17.1 g/dL 12.2 (L)    Hematocrit      38.5 - 50.0 % 38.7    MCV      80.0 - 100.0 fL 94.9    MCH      27.0 - 33.0 pg 29.9    MCHC      32.0 - 36.0 g/dL 31.5 (L)    RDW      11.0 - 15.0 % 12.7    Platelet Count      140 - 400 Thousand/uL 266    MPV      7.5 - 12.5 fL 11.5    Neutrophils Absolute      1,500 - 7,800 cells/uL 10,980 (H)    ABSOLUTE BAND NEUTROPHILS      0 - 750 cells/uL CANCELED    ABSOLUTE METAMYELOCYTES      0 cells/uL CANCELED    ABSOLUTE MYELOCYTES      0 cells/uL CANCELED    ABSOLUTE PROMYELOCYTES      0 cells/uL CANCELED    Lymphocytes Absolute      850 - 3,900 cells/uL 842 (L)    Monocytes Absolute      200 - 950 cells/uL 305    Eosinophils Absolute      15 - 500 cells/uL 24    Basophils Absolute      0 - 200 cells/uL 49    ABSOLUTE BLASTS      0 cells/uL CANCELED    ABSOLUTE NUCLEATED RBC      0 cells/uL CANCELED    Neutrophils %      % 90    BAND NEUTROPHILS      % CANCELED    Metamyelocytes      % CANCELED    MYELOCYTES      % CANCELED    PROMYELOCYTES      % CANCELED    Lymphocytes %      % 6.9    REACTIVE LYMPHOCYTES      0 - 10 % CANCELED    Monocytes %      % 2.5    Eosinophils %      % 0.2    Basophils %      % 0.4    BLASTS      % CANCELED    NUCLEATED RBC      0 /100 WBC CANCELED     COMMENT(S) CANCELED    Glucose      65 - 99 mg/dL 91    BUN      7 - 25 mg/dL 11    CREATININE      0.60 - 1.26 mg/dL 0.64    EGFR      > OR = 60 mL/min/1.73m2 127    BUN/CREATININE RATIO      6 - 22 (calc) SEE NOTE:    Sodium      135 - 146 mmol/L 140    Potassium      3.5 - 5.3 mmol/L 4.3    Chloride      98 - 110 mmol/L 104    Carbon Dioxide, Total      20 - 32 mmol/L 27    CALCIUM      8.6 - 10.3 mg/dL 9.3    PROTEIN, TOTAL      6.1 - 8.1 g/dL 6.8    Albumin      3.6 - 5.1 g/dL 4.1    Globulin      1.9 - 3.7 g/dL (calc) 2.7    A/G Ratio      1.0 - 2.5 (calc) 1.5    Total Bilirubin      0.2 - 1.2 mg/dL 0.4    Alkaline Phosphatase      36 - 130 U/L 94    AST (SGOT)      10 - 40 U/L 16    ALT (SGPT)      9 - 46 U/L 14    Cholesterol, Total      <200 mg/dL 152    HDL Cholesterol      > OR = 40 mg/dL 67    Triglycerides      <150 mg/dL 93    LDL Cholesterol Calc      mg/dL (calc) 67    Chol/HDL Ratio      <5.0 (calc) 2.3    NON-HDL CHOLESTEROL      <130 mg/dL (calc) 85    TSH      0.40 - 4.50 mIU/L 0.79    VITAMIN D, 25-OH, TOTAL      30 - 100 ng/mL 32       Legend:  (H) High  (L) Low    11/28/2014 - right wrist xray   1. No acute disease.  2. Old fracture fifth metacarpal.  3. Degenerative cysts lunate bone.    1/11/2014 - left hand xray   CONCLUSION:  1. Advanced arthritic changes about the wrist consistent with rheumatoid     arthritis with fusion of multiple carpal bones as well as     metacarpocarpal joints. There has been some progression of disease when     compared to June 20, 2011.  2. Some progression of disease at the first CMC joint.  3. The rest of the findings involving the interphalangeal and MCP joints     relatively stable.    1/11/2014 - chest xray   CONCLUSION:  Normal examination    4/18/218  Left knee MRI    ASSESSMENT AND PLAN:   Domingo George is a 33 year old male who presents for   Chief Complaint   Patient presents with    Rheumatoid Arthritis    Medication Follow-Up    Lab Results      1. 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 But slowly improving -   Maintaining  actemra 162mg every  weeks. - since 8/14/2023 to 4/2024 -   Switched actemra infusion 8mg/kg every month - in 6/2024 -   Missed 5/2024 - injections.    Off  ssz 500mg twice a day  since his stomach is bad -   Cont. Prednisone 10mmg a day -finally able to taper lower than 20mg!   - cont. methotrexate  9 tablets a week - and fa 1mg aday   - cont.  lefluomide 10mg a day to help taper the prednisone some more   Having surgery on left eye on may 2nd -   Norco bid     -   quantiferon gold 10/2022 negative. - repeat sooon  Left shoulder injectoins s/p steroid injection in 2/2023 -   Approved for disability   May 2024 - getting left eye surgery -   - b/c of flare of all joitnsmuch better on  prednisoen to 20mg a day  - will cont this for now    Weight  Loss is better t - to 119 lbs--> 140lbs --. 180lbs --> 191lbs -> 202lbs --> 211lbs.   Cont. meloxicam 15mg prn   Cont. norco prn   Labs today   rtc in 2m onths.     In the past:   Did not do better on  orencia 125mg a week since 7/2022 - but now off since 2/2023 -   He has not been able to start rinvoq - he is worried that he will get billed several thousands of dollars after 6montsh of using the copay card and had difficulty resolved a bill for $3000 after tyring to use the copay card -   Will not use rinvoq at this time.   Increasing pain was putting off enbrel x 11 months b/c of covid and sugery but now restarted on 11/5/2021 -   . enbrel 50mg a week - restarted 11/2021 - ok to start this b/c the brain tumor was not cancerous til now 3/10/2022 - not helping at all over 4 months -   He stopped enbrel in 3/2022   He  was to orencia 125mg a week - in 4/2022 - but he never started = started in 7/2022         For right knee pain   S/p right knee RA and OA on 3/2022 -   Aspirate 75cc right knee fluid     - s/p right knee injections - on 8/2021 , 3/10/2022  Try to avoid prednisone with his hx of AVN of left knee   - tried and had to stop enbrel, humira and remicade in the past   -     2. Left knee arthroplasty in 2018 - hx of AVN of left knee     3. Got covid vaccine in June 2021 -  4. 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 He has to wear glasses for 3 months. - his vision is not good b/c of this.   He is following with neuropthalmolgoist - surgery is the last option -     5. Gastroparesis - f/u GI - improving - watching his diet   Did well - did have recurrence - and saw GI in 5/2024 - zofran, omeprazole,   20% emptiyging in 4 hours -     6. 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 Left shoulder rotator cuff resurfacing   - Left Shoulder Open partial resurfacing of the humeral head, Open rotator cuff repair, Open biceps subpectoral tenodesis on 11/10/2023 -   Like a partial replacement -       Summary:  1   cont. actemra 8mg/kg every 4 weeks   2.  Cont. Methotrexate 9 tablets a week - .Cont. Folic acid 1mg a day   3. Cont. Leflunomide 10mg a day -   4. Cont.  Prednisone  10mg a day -  5. Check labs today  and then every 3months  6. Return to clinic in 3 months.   7. meloxicam 15mg a day   8. Norco bid prn - 1-2 daily prn     Israel Hyatt MD  7/10/2024   2:23 PM     is applicable because the patient's medical record notes reflects the ongoing nature of the continuous longitudinal relationship of care, and the medical record indicates that there is ongoing treatment of a serious/complex medical  condition.

## 2024-08-05 RX ORDER — METHOTREXATE 2.5 MG/1
TABLET ORAL
Qty: 108 TABLET | Refills: 1 | Status: SHIPPED | OUTPATIENT
Start: 2024-08-05

## 2024-08-05 NOTE — TELEPHONE ENCOUNTER
Requested Prescriptions     Pending Prescriptions Disp Refills    methotrexate 2.5 MG Oral Tab 108 tablet 1     Sig: TAKE 9 TABLETS BY MOUTH ONCE WEEKLY.     Future Appointments   Date Time Provider Department Center   8/7/2024  1:40 PM William Echeverria MD [facility]    10/23/2024 10:40 AM Israel Hyatt MD [facility]     LOV: 7/10/24  Last Refilled:4/2/24 #108 1RF   Labs:  Component      Latest Ref Rng 4/11/2024   WBC      4.0 - 11.0 x10(3) uL 11.8 (H)    RBC      4.30 - 5.70 x10(6)uL 4.04 (L)    Hemoglobin      13.0 - 17.5 g/dL 12.8 (L)    Hematocrit      39.0 - 53.0 % 38.2 (L)    MCV      80.0 - 100.0 fL 94.6    MCH      26.0 - 34.0 pg 31.7    MCHC      31.0 - 37.0 g/dL 33.5    RDW      11.0 - 15.0 % 13.3    RDW-SD      35.1 - 46.3 fL 46.3    Platelet Count      150.0 - 450.0 10(3)uL 267.0    Quantiferon TB NIL      IU/mL 0.09    Quantiferon-TB1 Minus NIL      IU/mL -0.02    Quantiferon-TB2 Minus NIL      IU/mL -0.02    Quantiferon TB Mitogen minus NIL      IU/mL 3.95    Quantiferon TB Result      Negative  Negative    CREATININE      0.70 - 1.30 mg/dL 0.70    EGFR      >=60 mL/min/1.73m2 123    ALT (SGPT)      10 - 49 U/L 18    SED RATE      0 - 15 mm/Hr 40 (H)    C-REACTIVE PROTEIN      <1.00 mg/dL 4.40 (H)    AST (SGOT)      <=34 U/L 31       Legend:  (H) High  (L) Low    Summary:  1   cont. actemra 8mg/kg every 4 weeks   2.  Cont. Methotrexate 9 tablets a week - .Cont. Folic acid 1mg a day   3. Cont. Leflunomide 10mg a day -   4. Cont.  Prednisone  10mg a day -  5. Check labs today  and then every 3months  6. Return to clinic in 3 months.   7. meloxicam 15mg a day   8. Norco bid prn - 1-2 daily prn      Israel Hyatt MD  7/10/2024   2:23 PM

## 2024-08-09 ENCOUNTER — MED REC SCAN ONLY (OUTPATIENT)
Dept: RHEUMATOLOGY | Facility: CLINIC | Age: 34
End: 2024-08-09

## 2024-08-10 RX ORDER — MELOXICAM 15 MG/1
15 TABLET ORAL DAILY
Qty: 30 TABLET | Refills: 5 | Status: SHIPPED | OUTPATIENT
Start: 2024-08-10

## 2024-08-10 NOTE — TELEPHONE ENCOUNTER
Requested Prescriptions     Pending Prescriptions Disp Refills    MELOXICAM 15 MG Oral Tab [Pharmacy Med Name: MELOXICAM 15 MG TABLET] 30 tablet 5     Sig: TAKE 1 TABLET (15 MG TOTAL) BY MOUTH DAILY.       Future Appointments   Date Time Provider Department Center   10/23/2024 10:40 AM Israel Hyatt MD [facility]     LOV: 7/10/24   Last Refilled:11/14/23 #90 1RF   Labs:    Component      Latest Ref Rng 4/11/2024   WBC      4.0 - 11.0 x10(3) uL 11.8 (H)    RBC      4.30 - 5.70 x10(6)uL 4.04 (L)    Hemoglobin      13.0 - 17.5 g/dL 12.8 (L)    Hematocrit      39.0 - 53.0 % 38.2 (L)    MCV      80.0 - 100.0 fL 94.6    MCH      26.0 - 34.0 pg 31.7    MCHC      31.0 - 37.0 g/dL 33.5    RDW      11.0 - 15.0 % 13.3    RDW-SD      35.1 - 46.3 fL 46.3    Platelet Count      150.0 - 450.0 10(3)uL 267.0    Quantiferon TB NIL      IU/mL 0.09    Quantiferon-TB1 Minus NIL      IU/mL -0.02    Quantiferon-TB2 Minus NIL      IU/mL -0.02    Quantiferon TB Mitogen minus NIL      IU/mL 3.95    Quantiferon TB Result      Negative  Negative    CREATININE      0.70 - 1.30 mg/dL 0.70    EGFR      >=60 mL/min/1.73m2 123    ALT (SGPT)      10 - 49 U/L 18    SED RATE      0 - 15 mm/Hr 40 (H)    C-REACTIVE PROTEIN      <1.00 mg/dL 4.40 (H)    AST (SGOT)      <=34 U/L 31       Legend:  (H) High  (L) Low    Summary:  1   cont. actemra 8mg/kg every 4 weeks   2.  Cont. Methotrexate 9 tablets a week - .Cont. Folic acid 1mg a day   3. Cont. Leflunomide 10mg a day -   4. Cont.  Prednisone  10mg a day -  5. Check labs today  and then every 3months  6. Return to clinic in 3 months.   7. meloxicam 15mg a day   8. Norco bid prn - 1-2 daily prn      Israel Hyatt MD  7/10/2024   2:23 PM

## 2024-10-03 ENCOUNTER — MED REC SCAN ONLY (OUTPATIENT)
Dept: RHEUMATOLOGY | Facility: CLINIC | Age: 34
End: 2024-10-03

## 2024-10-23 ENCOUNTER — OFFICE VISIT (OUTPATIENT)
Dept: RHEUMATOLOGY | Facility: CLINIC | Age: 34
End: 2024-10-23

## 2024-10-23 VITALS
DIASTOLIC BLOOD PRESSURE: 68 MMHG | WEIGHT: 208.88 LBS | HEIGHT: 70 IN | SYSTOLIC BLOOD PRESSURE: 114 MMHG | BODY MASS INDEX: 29.9 KG/M2 | RESPIRATION RATE: 16 BRPM | HEART RATE: 65 BPM

## 2024-10-23 DIAGNOSIS — M25.572 ACUTE LEFT ANKLE PAIN: ICD-10-CM

## 2024-10-23 DIAGNOSIS — M06.9 RHEUMATOID ARTHRITIS, INVOLVING UNSPECIFIED SITE, UNSPECIFIED WHETHER RHEUMATOID FACTOR PRESENT (HCC): Primary | ICD-10-CM

## 2024-10-23 DIAGNOSIS — Z51.81 THERAPEUTIC DRUG MONITORING: ICD-10-CM

## 2024-10-23 PROCEDURE — 3078F DIAST BP <80 MM HG: CPT | Performed by: INTERNAL MEDICINE

## 2024-10-23 PROCEDURE — 20605 DRAIN/INJ JOINT/BURSA W/O US: CPT | Performed by: INTERNAL MEDICINE

## 2024-10-23 PROCEDURE — 3074F SYST BP LT 130 MM HG: CPT | Performed by: INTERNAL MEDICINE

## 2024-10-23 PROCEDURE — 99214 OFFICE O/P EST MOD 30 MIN: CPT | Performed by: INTERNAL MEDICINE

## 2024-10-23 PROCEDURE — 3008F BODY MASS INDEX DOCD: CPT | Performed by: INTERNAL MEDICINE

## 2024-10-23 RX ORDER — TRIAMCINOLONE ACETONIDE 40 MG/ML
20 INJECTION, SUSPENSION INTRA-ARTICULAR; INTRAMUSCULAR ONCE
Status: COMPLETED | OUTPATIENT
Start: 2024-10-23 | End: 2024-10-23

## 2024-10-23 NOTE — PROGRESS NOTES
Domingo George is a 33 year old male who presents for   Chief Complaint   Patient presents with    Rheumatoid Arthritis    Medication Follow-Up    Ankle Pain     Left   .   HPI:     I had the pleasure of seeing Domingo George on 2/15/2020 for evaluation.    He is a pleasant 29 year old who had seropositive RA - since age 14. His rheumatologist is retiring and he is re-establishing care.   He was diagnosed in [place]. He started getting pain and swellign in his left knee. His other joitns - his left wrist is gradaully affected. And his 5th fingers - he can't bend now.   He is on methotrexate 20mg a week, fa and [id number] a day.   He has taken prednisone as needed. He still gets swelling around his left knee. He can walk with the left knee replacement and still gets swelling.   He had torn his ACL and MCL in his lef tknee prior ot that. Then on MRI of his left knee in 2018 he was found to have AVN.     He used to be on Enbrel but couldn't stay on it b/c of the cost. It's been a year since he was on it.  He has new insurance now.   He  started on humira when he was first diagnosed but it was too expensive. He also would get injection site burning senstaion.  But then he also was on iv medication. He can't remember if it was remicade. Overall it became  Expensive. Then he was on enbrel for around 4 years around age 19. . Then around 24 he started having issues with being on biologics b/c of the expensive of the medication. It's been off and on since.   He hasn't seen dr. martin since after his left knee surgery. It;s been a long recovery for him. He hasn't been back to see her. He just coudnlt' seen her in the hours she had.     He has gradually increased back pain. When his back acts up he gets shortness of breath. If hes' lifting or bending, his back can get tight. Not much pain when laying down. 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   His right knee on occasion can hurt. And occl his  elbows. occl his right ankle bothers him. No hip pain.   He has pain in his upper back.     He's not been on any other medication.   His eyes have gotten red and hurt. He has never seen an eye doctor. He's going to see an eye doctor.   No hx of psoriasis.     He sees dr. velazquez for a few years. He has been filling the methotrexate.   He feels some days are better than others.   He mainly has pain in his elbows, right knee and occl his hands.       In 2018, he had left knee arthroplasty with Dr. Schmitt.     6/1/2020  He forgot to get labs - he is going to try to goto tomorrow.   He misplaced the orders. He still wants to try enbrel  So he is just on methotrexate 20mg a week and meloxicam. He feels pretty good. He has his ups and downs.   He has about 2-3/10.   His right knee and his hands hurt a litlte bit.   It's manageable. He feels the weather fluctuations flare him.   No flares.   He's had a few flares - little ones.   He has about 1 hours of morning stiffness   His left side of neck gets stiff in the hims  He has mild elbow pain as well.   He has a lot of stress with work - and he works for [employer]     9/1/2020   He just had his labs done yesterday at [email or web address] - it was in [place] -   The methotrexate he's good. He feels pretty good and he has goo and bad days. He feels his pain is 2/10. He feels the weather and what he is doing matters.   He still wants to start enbrel. He hasn't started yet.   He feels still 1 hour of morning stiffnes. He feels sitting for long period times .   He feels latealy his elbows are acting up more often and his wrists.   He has limitation in range of mtion of his joitns.   He has noticed his neck at the base  - it hurts more often. He has migraines so doesn't know if it's 'related.   He has good rom. It acts up now and then.   He takes pain meds - but limits it if the pain is really bad.     2/20/2021  He waited to start enbrel b/c of covid.   His wife got covid in 12/2020 and he thinks  hemight have had it.   He is feeling worse and worse by the day.   He is having about 5/10 pain. It' snot good.   He has pain on his left shoulder , hands , feet , ankles and right knee.   He has left elbow pain. He can't straighten that.     5/15/2021  He has 5/10 pain . He has a lot of discomfrot an pain.   He is expecting his 2nd baby in 11/10/2021   His feet , ankles and right knee hurt pretty bad. He saw dr. 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   He has pian in his hands, left shouldera and right hip.   He is not started the enbrel since last time. He's not gotten a call.   He did change insurance at the beginning of the year nad it looks like the script [facility].   He is truding along.     8/21/2021  He's not started the enbrel yet.   He states he didn't get a phone call. He also doesn't check my chart.   He is losing weight and appetitis. If he eats then he has diarrhea or vomitting.   He didn't do his labs.   He has bad left shoulder and ankle pain.   He is having 8/10 pain.   The kenalog shot helped for 1 week, then the medrol santiago helped for 1 week.     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     He had bad falre up in rehab. He couldn't walk b/c of his arthrits.   He had started prednisone in end of October. Now at home he's on prednisone 5mg a day.   He was given trexall 2 tablets - 10mg each - 20mg every week -   He last took 2 weeks ago. He missed this Saturday.   The flares up s were better til last week.   He's been on enbrel in beg of 11/2021  No trouble with the shots.   The pain gets to 7/10 pain. - feet, left knee, left hand and shoulder.     He's done with physical therapy - on 11/24/2021 -   hes' walking with a walker - using it mostly for balance.   It's not the pain. He's praciit=icing     3/10/2022  hes' having right knee swellign and it hurts and  his hands hurt.   He cant' use his left shoulder.   He ahs 10/10 pain.   He actually feels worse than his last visit.   He's in a wheel chair to get around.   He has lost a lot of weight. He's vomitting after eating.   He doesn't feel the medication is making him nauseaous. He has lost 73 pouns -   He had a colonsocopy and EGD - and he's throwing up all the time. He's doing this since last year.   Removing the tumor did not help.   He took the medrol santiago and that helps him efor tat week.     7/5/2022  He hasn't been doing orencia b/c he was told it was denied.   He was dx with gastropereisis and had cholecystecomy.   evern since it's removed, he is able to be removed but it's still hurting.   It's not hurting.   He's vomitting - still post cck, it's not daily.   He throws up a couple of ton a week     He has ongoing right hip pain - right hsoulder and right wrist pain.   He has the major area of pain.   The right knee has been ok -   He's on prednison e10mg a day - , methtorexate 8 tablets a week   Off enbrel since 3/2022 -     No fevers.   Post surgery he has no pain.   All his pain is joint pain.     He's taking this norco post surgery - he's taking 1 daily -   [id number] - breaks them in half and takes 1/2 tablets.     No diarrhea -   He feels fine - he's not eating or over doing it - [facility].     He lost 15lbs.       8/8/2022  He's been taking orecia for the last 4 weeks.   He is also musa cammy prednisone 20mg ad starr.   He rhas 5-6/10 pain . He is using a cane.   Hthe pain is much better than what it was.   His eating is a lot better.   He has gained weight. He has 20 pound weight gain. He's able to keep it down.   He has no side effects with hte prednisone.   The orencia feels better than the enbrel in terms of injection.   His right hip, right wrist and right shoulder is better but still pain.   His right knee is acting up right now.   He's trying to push himslef to exercise more.   Started  orencia weekly in 7/2022 -     10/10/2022  He doesn't need a cane. He feels his weight is getting better.   His hands , feet and shoulders hurt really badyly and it's hard for him to  things now.   He has a bout 7/10 pain.   He has a hard time closing his hands.   The orencia is on for about 3 months now.   He feels the waether changing is playing a factor in more achyiness.   No stomach pain.   His stomach is better - rarely any vomitting.   He does have more pain than his last visit - thinks it's the weather.   He's still on pred 20mg a day, orencia 125mg a week and methtorexate 20mg a week.    2/1/2023  His pain is 7/10 pain. His sed rate was 71mm/hr. I increased his pred to 30mg a day    His left shoulder is really hard to lift. definietly the pain spikes up more.   He is tolerating the ssz but he's not really noticing a differnce with it.   He's not vomitting as much as he used to.   He still has balance if off. He's trying to walk without the cane. He's off physical therapy.   He can close his hands. He still can't  things well.     4/11/2023  He has been off orencia and not started rinvoq yet. He was approved but there was a snafu on using the savings card and he was billed $3000.   He has pain in his hands and feet and ankles.   He is worse off biologic at this points.   He's on methorexate 9 tablets a week and prednisone 20mg a day.   He has 7/10 pain . He has more pain off orencia.   He has gained weight.     His left shoulder injection only lasted a few weeks.   He can't move it mouch.   He needs help getting dressed.   He's applying for disaiblity.     6/12/2023   He checked with the [employer] rep and then his pharmacy - there eis nothing on his bill now.   He hasn't started rinvoq yet - he is very worried the same thing is going to happen when he gets the next month and he can't get in touch with anyone who can assure him it won't.   He has 8/10 pain - he has alot of left shoulder wrists, and back.  He has back spasms and difficulty breathing when that happenes.     He hasn't been working for 1 year - and appliying for disability   He still has issue with his gastroparesis but not as severe as it was   He's on pred 20mg a day, methotrexate 9 tablets a week and ssz 500mg bid.     8/14/2023  He got the assitance card, and he scheduled the shipment.   He is not on actemra yet - saw PA on 6/14/2023 - and he finally is getting it.   His back and left shoulder is huritng badly - his ankles and hands are hurting as well.   He is still badly having phi apin. He is walking without assitance still.   There hasn't been any change since his last viist.   His stomach is stable.   He's on pred 20mg a day, methotrexate 9 tablets a week and ssz 500mg bid.     10/23/2023  He has been on actemra - and he feels no different with it.   No side effects.   He's till on pred 20g a day.   No issues with billing.   He's having surgery on 11/10.   His walking I horrible. Hisi ankles are painful. He ha 01/10 apin.   Hi knes are finge.   Hi hands , elbows and left shoulder is bad.   He's getting surgery with dr. Clements at [facility]    1/29/2024  He had left shoulder surgery on 11/10/2023 he is doing ok with this.   His feet and ankles  and hands and left elbow is horrbile. .   He does feel the actemra is keeping it at bay.   Since novemeber he's on prednisone 10mg a day he's handling it.  His feet can hurt on walking - 7/10 pain.   Getting eye surgery in 5/2023 - to improved the double vision and misaligngment in his left eye.   No covid, no infections since his last visit.     4/10/2024  He felt he was sick with flu - and held his meds for 1 week   He did noticed it was helping with the adding the leflunomide 10mg ad ay.   He's on actemra 162mg aw Kake,   The last few days his left elbows and bialteral ankles hurt a lot.   He also cut the grass yesterday  - he is sore for a couple of days.     Approved for disability -      7/10/2024  He is getting the actemra - infusions now - got it 6/2024 - the infusion - and it's helping   Missed 5/2024 - injections due to insurance switch   He feels ok with it.   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 But today - his left ankle is hurting 8/10 pain.   Weather changes are ok.   He still has double vision - his balance has improved after 5/2024 - sx.   He is stable but overall he has his pains -   He would like a left anlke injection today b/c he has a wedding coming up this weekend.       Wt Readings from Last 2 Encounters:   10/23/24 208 lb 14.4 oz (94.8 kg)   07/10/24 203 lb 11.2 oz (92.4 kg)     Body mass index is 29.97 kg/m².      Current Outpatient Medications   Medication Sig Dispense Refill    HYDROcodone-acetaminophen (NORCO) 7.5-325 MG Oral Tab Take 1 tablet by mouth every 4 (four) hours as needed for Pain. 60 tablet 0    butalbital-acetaminophen-caffeine -40 MG Oral Tab Take 1 tablet by mouth every 4 (four) hours as needed for Pain. 30 tablet 1    MELOXICAM 15 MG Oral Tab TAKE 1 TABLET (15 MG TOTAL) BY MOUTH DAILY. 30 tablet 5    tiZANidine 2 MG Oral Tab Take 1 tablet (2 mg total) by mouth every 6 (six) hours as needed. 30 tablet 1    methotrexate 2.5 MG Oral Tab TAKE 9 TABLETS BY MOUTH ONCE WEEKLY. 108 tablet 1    leflunomide 10 MG Oral Tab Take 1 tablet (10 mg total) by mouth daily. 90 tablet 1    FOLIC ACID 1 MG Oral Tab TAKE 1 TABLET BY MOUTH EVERY DAY 30 tablet 11    Tocilizumab (ACTEMRA ACTPEN) 162 MG/0.9ML Subcutaneous Solution Auto-injector Inject 162 mg into the skin every 7 days. 4  each 5    Multiple Vitamins-Minerals (MULTI-VITAMIN/MINERALS) Oral Tab Take 1 tablet by mouth daily.        Past Medical History:    Anxiety state    Brain tumor (HCC)    Rheumatoid arthritis (HCC)    Visual impairment    glasses      Past Surgical History:   Procedure Laterality Date    Cholecystectomy      Colonoscopy N/A 02/17/2022    Procedure: COLONOSCOPY/ESOPHAGOGASTRODUODENOSCOPY (EGD);  Surgeon: Ottoniel Jorgensen MD;  [facility] ENDOSCOPY    Knee arthroscopy Left 2009    ACL/MCL repair     Knee replacement surgery Left 08/2018    Other  10/13/2021    SUBOCCIPITAL CRANIECTOMY FOR TUMOR WITH NEURO NAVIGATION, MICROSCOPE DISSECTION AND NEURO MONITORING    Rotator cuff repair Left 11/10/2023    SHOULDER REPAIR      Family History   Problem Relation Age of Onset    No Known Problems Father     No Known Problems Mother     No Known Problems Daughter     No Known Problems Son     No Known Problems Maternal Grandmother     No Known Problems Maternal Grandfather     No Known Problems Paternal Grandmother     No Known Problems Paternal Grandfather     No Known Problems Sister     No Known Problems Brother     No Known Problems Other    no arthritis, 1 sister,    Social History:  Social History     Socioeconomic History    Marital status:    Tobacco Use    Smoking status: Never    Smokeless tobacco: Never   Vaping Use    Vaping status: Never Used   Substance and Sexual Activity    Alcohol use: Not Currently     Comment: rare    Drug use: No   Other Topics Concern    Caffeine Concern No    Exercise No    Seat Belt No    Special Diet No    Stress Concern No    Weight Concern No     Social Drivers of Health     Food Insecurity: Low Risk  (12/7/2021)    [facility]    Food Insecurity     Have there been times that your food ran out, and you didn't have money to get more?: No     Are there times that you worry that this might happen?: No    Transportation Needs: Low Risk  (12/7/2021)    Received from [facility]    Transportation Needs     Do you have trouble getting transportation to medical appointments?: No   Housing Stability: Low Risk  (12/7/2021)    Received from [facility]    Housing Stability     Are you concerned about having a safe and reliable place to live?: No      , 1 year old   Manager at [employer]     REVIEW OF SYSTEMS:   Review Of Systems:  Fatigue  Constitutional:No fever, no change in weight or appetitie  Derm: No rashes, no oral ulcers, no alopecia, no photosensitivity, no psoriasis  HEENT: No dry eyes, no dry mouth, no Raynaud's, no nasal ulcers, no parotid swelling, occ - neck pain, no jaw pain, no temple pain  Eyes: No visual changes,   CVS: No chest pain, no heart disease  RS: No SOB, no Cough, No Pleurtic pain,   GI: No nausea, no vomiiting, no abominal pain, no hx of ulcer, no gastritis, no heartburn, no dyshpagia, no BRBPR or melena  He has a lot of pain with eating bad food - he doesn't have a good diet. He's having his gall bladder checked out.   : no dysuria, ,   Neuro: No numbness or tingling, gets migraines often - since age 18 he gets  headache, no hx of seizures,   Psych: no hx of anxiety or depression  ENDO: no hx of thyroid disease, no hx of DM  Joint/Muscluskeltal: see HPI,   All other ROS are negative.     EXAM:   /68   Pulse 65   Resp 16   Ht 5' 10\" (1.778 m)   Wt 208 lb 14.4 oz (94.8 kg)   BMI 29.97 kg/m²   HEENT:  EOM intact, clear sclear, PERRLA, pleasant, no acute distress, no CAD, no neck tendnerness, good ROM,   No rashes  Thin cachectic   CVS: RRR, no murmurs  RS: CTAB, no crackles, no rhonchi  ABD: Soft Non tender, greenish bluish dicloration over abdomen   No hepatomegaly since -   No abdominal tendenress   Joint exam   Can't close his hands like his left 1st aand 5th fingers.  Arlet his let 3rd finger - , better in right hand.   Tender more oin left 2nd and 3rd mcps with chronic swelling.   Tender in left 4th pip joint with flexion   Chronic +1-2swelling in right 2nd and 3rd mcps with tenderness and left+1 2nd and 3rd mcps with tenerness - improving   +chronic swelling in 3-5th mcps and 1-5th pips - improved.   Ulnar deviation of right 5th finger   Ulnar deviation of left 2nd finger noted with dip joitn changes  Left wrist mild +1 swelling with fusion   Right wrist mild -swellign , almost ankyloised.   B/l elbows tender, arlet left elbows.  - with 15- 30 degree flexion deformities in left elbow   Left shoulder resplaced - improved abduction.   Right knee mild  Tender, mild   swleling - this has improved   Left knee +1 Swellig. , tender   No neck tenderness at this time.   Right ankle mild swelling and tender   Left ankle +1 swelling tender.   bl feet squeeze test positive   Right hip not tender - decreased ext rotation by 15 degrees   No edema    Component      Latest Ref Rng & Units 8/31/2020   Glucose      65 - 99 mg/dL 108 (H)   BUN      7 - 25 mg/dL 10   CREATININE      0.60 - 1.35 mg/dL 0.84   eGFR NON-AFR. AMERICAN      > OR = 60 mL/min/1.73m2 117   eGFR AFRICAN AMERICAN      > OR = 60 mL/min/1.73m2 135   Bun/Creatinine Ratio      6 - 22 (calc) NOT APPLICABLE   SODIUM, SERUM      135 - 146 mmol/L 141   POTASSIUM, SERUM      3.5 - 5.3 mmol/L 3.7   CHLORIDE, SERUM      98 - 110 mmol/L 101   CARBON DIOXIDE      20 - 32 mmol/L 32   CALCIUM      8.6 - 10.3 mg/dL 9.7   PROTEIN, TOTAL      6.1 - 8.1 g/dL 7.4   Albumin      3.6 - 5.1 g/dL 4.5   Globulin, Total      1.9 - 3.7 g/dL (calc) 2.9   ALBUMIN/GLOBULIN RATIO      1.0 - 2.5 (calc) 1.6   Total Bilirubin      0.2 - 1.2 mg/dL 0.3   Alkaline Phosphatase      36 - 130 U/L 114   AST      10 - 40 U/L 16   ALT (SGPT)      9 - 46 U/L 20   WHITE BLOOD CELL COUNT      3.8 - 10.8 Thousand/uL 9.6   RED BLOOD CELL COUNT      4.20 - 5.80 Million/uL 4.46    Hemoglobin      13.2 - 17.1 g/dL 14.4   Hematocrit      38.5 - 50.0 % 41.4   MCV      80.0 - 100.0 fL 92.8   MCH      27.0 - 33.0 pg 32.3   MCHC      32.0 - 36.0 g/dL 34.8   RDW      11.0 - 15.0 % 11.9   Platelet Count      140 - 400 Thousand/uL 238   MPV      7.5 - 12.5 fL 11.7   QUANTIFERON(R)-TB GOLD PLUS, 1 TUBE      NEGATIVE NEGATIVE   NIL      IU/mL 0.02   MITOGEN-NIL      IU/mL >10.00   TB1-NIL      IU/mL 0.00   TB2-NIL      IU/mL 0.00   HEPATITIS C ANTIBODY      NON-REACTIVE NON-REACTIVE   SIGNAL TO CUT-OFF      <1.00 0.02   C-REACTIVE PROTEIN      <8.0 mg/L 10.4 (H)   SED RATE BY MODIFIED$WESTERGREN      < OR = 15 mm/h 14   EDWARD SCREEN, IFA      NEGATIVE NEGATIVE   HEPATITIS B CORE AB TOTAL      NON-REACTIVE NON-REACTIVE   HBSAg Screen      NON-REACTIVE NON-REACTIVE   RHEUMATOID FACTOR      <14 IU/mL 27 (H)   CYCLIC CITRULLINATED$PEPTIDE (CCP) AB (IGG)      UNITS 120 (H)   VITAMIN D, 25-OH, TOTAL      30 - 100 ng/mL 26 (L)     Component      Latest Ref [facility] 5/24/2024   WBC      3.8 - 10.8 Thousand/uL 12.2 (H)    RBC      4.20 - 5.80 Million/uL 4.08 (L)    Hemoglobin      13.2 - 17.1 g/dL 12.2 (L)    Hematocrit      38.5 - 50.0 % 38.7    MCV      80.0 - 100.0 fL 94.9    MCH      27.0 - 33.0 pg 29.9    MCHC      32.0 - 36.0 g/dL 31.5 (L)    RDW      11.0 - 15.0 % 12.7    Platelet Count      140 - 400 Thousand/uL 266    MPV      7.5 - 12.5 fL 11.5    Neutrophils Absolute      1,500 - 7,800 cells/uL 10,980 (H)    ABSOLUTE BAND NEUTROPHILS      0 - 750 cells/uL CANCELED    ABSOLUTE METAMYELOCYTES      0 cells/uL CANCELED    ABSOLUTE MYELOCYTES      0 cells/uL CANCELED    ABSOLUTE PROMYELOCYTES      0 cells/uL CANCELED    Lymphocytes Absolute      850 - 3,900 cells/uL 842 (L)    Monocytes Absolute      200 - 950 cells/uL 305    Eosinophils Absolute      15 - 500 cells/uL 24    Basophils Absolute      0 - 200 cells/uL 49    ABSOLUTE BLASTS      0 cells/uL CANCELED    ABSOLUTE NUCLEATED RBC      0 cells/uL CANCELED     Neutrophils %      % 90    BAND NEUTROPHILS      % CANCELED    Metamyelocytes      % CANCELED    MYELOCYTES      % CANCELED    PROMYELOCYTES      % CANCELED    Lymphocytes %      % 6.9    REACTIVE LYMPHOCYTES      0 - 10 % CANCELED    Monocytes %      % 2.5    Eosinophils %      % 0.2    Basophils %      % 0.4    BLASTS      % CANCELED    NUCLEATED RBC      0 /100 WBC CANCELED    COMMENT(S) CANCELED    Glucose      65 - 99 mg/dL 91    BUN      7 - 25 mg/dL 11    CREATININE      0.60 - 1.26 mg/dL 0.64    EGFR      > OR = 60 mL/min/1.73m2 127    BUN/CREATININE RATIO      6 - 22 (calc) SEE NOTE:    Sodium      135 - 146 mmol/L 140    Potassium      3.5 - 5.3 mmol/L 4.3    Chloride      98 - 110 mmol/L 104    Carbon Dioxide, Total      20 - 32 mmol/L 27    CALCIUM      8.6 - 10.3 mg/dL 9.3    PROTEIN, TOTAL      6.1 - 8.1 g/dL 6.8    Albumin      3.6 - 5.1 g/dL 4.1    Globulin      1.9 - 3.7 g/dL (calc) 2.7    A/G Ratio      1.0 - 2.5 (calc) 1.5    Total Bilirubin      0.2 - 1.2 mg/dL 0.4    Alkaline Phosphatase      36 - 130 U/L 94    AST (SGOT)      10 - 40 U/L 16    ALT (SGPT)      9 - 46 U/L 14    Cholesterol, Total      <200 mg/dL 152    HDL Cholesterol      > OR = 40 mg/dL 67    Triglycerides      <150 mg/dL 93    LDL Cholesterol Calc      mg/dL (calc) 67    Chol/HDL Ratio      <5.0 (calc) 2.3    NON-HDL CHOLESTEROL      <130 mg/dL (calc) 85    TSH      0.40 - 4.50 mIU/L 0.79    VITAMIN D, 25-OH, TOTAL      30 - 100 ng/mL 32       Component      Latest Ref Rng 4/11/2024   Quantiferon TB NIL      IU/mL 0.09    Quantiferon-TB1 Minus NIL      IU/mL -0.02    Quantiferon-TB2 Minus NIL      IU/mL -0.02    Quantiferon TB Mitogen minus NIL      IU/mL 3.95    Quantiferon TB Result      Negative  Negative          11/28/2014 - right wrist xray   1. No acute disease.  2. Old fracture fifth metacarpal.  3. Degenerative cysts lunate bone.    1/11/2014 - left hand xray   CONCLUSION:  1. Advanced arthritic changes about the  wrist consistent with rheumatoid     arthritis with fusion of multiple carpal bones as well as     metacarpocarpal joints. There has been some progression of disease when     compared to June 20, 2011.  2. Some progression of disease at the first CMC joint.  3. The rest of the findings involving the interphalangeal and MCP joints     relatively stable.    1/11/2014 - chest xray   CONCLUSION:  Normal examination    4/18/218  Left knee MRI    ASSESSMENT AND PLAN:   Dmoingo George is a 33 year old male who presents for   Chief Complaint   Patient presents with    Rheumatoid Arthritis    Medication Follow-Up    Ankle Pain     Left     1. 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 But slowly improving -   Maintaining  actemra 162mg every  weeks. - since 8/14/2023 to 4/2024 -   Switched actemra infusion 8mg/kg every month - in 6/2024 -   Missed 5/2024 - injections.    Off  ssz 500mg twice a day  since his stomach is bad -   Cont. Prednisone 10mmg a day -finally able to taper lower than 20mg!   - cont. methotrexate  9 tablets a week - and fa 1mg aday   - cont.  lefluomide 10mg a day to help taper the prednisone some more   Having surgery on left eye on may 2nd -   Norco bid     -   quantiferon gold 10/2022 negative. - repeat sooon  Left shoulder injectoins s/p steroid injection in 2/2023 -   Approved for disability   May 2024 - getting left eye surgery -   - b/c of flare of all joitnsmuch better on  prednisoen to 20mg a day  - will cont this for now    Weight  Loss is better t - to 119 lbs--> 140lbs --. 180lbs --> 191lbs -> 202lbs --> 211lbs.   Cont. meloxicam 15mg prn   Cont. norco prn   Labs today   rtc in 2m onths.     In the past:   Did not do  better on  orencia 125mg a week since 7/2022 - but now off since 2/2023 -   He has not been able to start rinvoq - he is worried that he will get billed several thousands of dollars after 6montsh of using the copay card and had difficulty resolved a bill for $3000 after tyring to use the copay card -   Will not use rinvoq at this time.   Increasing pain was putting off enbrel x 11 months b/c of covid and kori but now restarted on 11/5/2021 -   . enbrel 50mg a week - restarted 11/2021 - ok to start this b/c the brain tumor was not cancerous til now 3/10/2022 - not helping at all over 4 months -   He stopped enbrel in 3/2022   He was to orencia 125mg a week - in 4/2022 - but he never started = started in 7/2022         For right knee pain   S/p right knee RA and OA on 3/2022 -   Aspirate 75cc right knee fluid     - s/p right knee injections - on 8/2021 , 3/10/2022  Try to avoid prednisone with his hx of AVN of left knee   - tried and had to stop enbrel, humira and remicade in the past   -     2. Left knee arthroplasty in 2018 - hx of AVN of left knee     3. Got covid vaccine in June 2021 -  4. 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 He has to wear glasses for 3 months. - his vision is not good b/c of this.   He is following with neuropthalmolgoist - surgery is the last option -     5. Gastroparesis - f/u GI - improving - watching his diet   Did well - did have recurrence - and saw GI in 5/2024 - zofran, omeprazole,   20% emptiyging in 4 hours -   - on omeprazole -and zofran     6. 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 Left shoulder rotator cuff resurfacing   - Left Shoulder Open partial resurfacing of the humeral head, Open rotator cuff repair, Open biceps subpectoral tenodesis on 11/10/2023 -   Like a  partial replacement -     11. Chronic prednisone - calcium vit 1 tablet -   12. Left ankel pain flare -   With  consent, I injected the patient's  left ankle with 0.5ml lidocaine  1% and 0.5ml kenalog 40. It was under sterile technique using iodine and alcohol swabs and ethyl chloride was used as an anaesthetic spray. Pt.  tolerated it well.      Summary:  1   cont. actemra 8mg/kg every 4 weeks   2.  Cont. Methotrexate 9 tablets a week - .Cont. Folic acid 1mg a day   3. Cont. Leflunomide 10mg a day -   4. Cont.  Prednisone  10mg a day -  5. Check labs today  and then every 3months  6. Return to clinic in 3 months.   7. meloxicam 15mg a day   8. Norco bid prn - 1-2 daily prn   9. Try diet changes with turmeric, calcium /vit d - and meditterena diet   10. Rest left ankle x 3 days     Israel Hyatt MD  10/23/2024   11:15 AM         is applicable because the patient's medical record notes reflects the ongoing nature of the continuous longitudinal relationship of care, and the medical record indicates that there is ongoing treatment of a serious/complex medical condition.

## 2024-10-23 NOTE — PROCEDURES
With  consent, I injected the patient's  left ankle with 0.5ml lidocaine  1% and 0.5ml kenalog 40. It was under sterile technique using iodine and alcohol swabs and ethyl chloride was used as an anaesthetic spray. Pt.  tolerated it well.

## 2024-10-23 NOTE — PATIENT INSTRUCTIONS
1   cont. actemra 8mg/kg every 4 weeks   2.  Cont. Methotrexate 9 tablets a week - .Cont. Folic acid 1mg a day   3. Cont. Leflunomide 10mg a day -   4. Cont.  Prednisone  10mg a day -  5. Check labs today  and then every 3months  6. Return to clinic in 3 months.   7. meloxicam 15mg a day   8. Norco bid prn - 1-2 daily prn   9. Try diet changes with turmeric, calcium /vit d - and meditterena diet   10. Rest left ankle x 3 days

## 2024-11-25 RX ORDER — PREDNISONE 10 MG/1
10 TABLET ORAL DAILY
Qty: 90 TABLET | Refills: 1 | Status: SHIPPED | OUTPATIENT
Start: 2024-11-25

## 2024-11-25 NOTE — TELEPHONE ENCOUNTER
LOV: 10/23/24  No future appointments.    Summary:  1   cont. actemra 8mg/kg every 4 weeks   2.  Cont. Methotrexate 9 tablets a week - .Cont. Folic acid 1mg a day   3. Cont. Leflunomide 10mg a day -   4. Cont.  Prednisone  10mg a day -  5. Check labs today  and then every 3months  6. Return to clinic in 3 months.   7. meloxicam 15mg a day   8. Norco bid prn - 1-2 daily prn   9. Try diet changes with turmeric, calcium /vit d - and meditterena diet   10. Rest left ankle x 3 days      Israel Hyatt MD  10/23/2024   11:15 AM

## 2025-01-17 ENCOUNTER — APPOINTMENT (OUTPATIENT)
Dept: GENERAL RADIOLOGY | Facility: HOSPITAL | Age: 35
End: 2025-01-17
Payer: COMMERCIAL

## 2025-01-17 ENCOUNTER — HOSPITAL ENCOUNTER (EMERGENCY)
Facility: HOSPITAL | Age: 35
Discharge: HOME | End: 2025-01-17
Payer: COMMERCIAL

## 2025-01-17 ENCOUNTER — APPOINTMENT (OUTPATIENT)
Dept: CT IMAGING | Facility: HOSPITAL | Age: 35
End: 2025-01-17
Attending: NURSE PRACTITIONER
Payer: COMMERCIAL

## 2025-01-17 VITALS
HEART RATE: 70 BPM | OXYGEN SATURATION: 96 % | HEIGHT: 70 IN | RESPIRATION RATE: 18 BRPM | WEIGHT: 200 LBS | DIASTOLIC BLOOD PRESSURE: 61 MMHG | BODY MASS INDEX: 28.63 KG/M2 | TEMPERATURE: 99 F | SYSTOLIC BLOOD PRESSURE: 122 MMHG

## 2025-01-17 DIAGNOSIS — J18.9 MULTIFOCAL PNEUMONIA: Primary | ICD-10-CM

## 2025-01-17 LAB
ALBUMIN SERPL-MCNC: 4.6 G/DL (ref 3.2–4.8)
ALBUMIN/GLOB SERPL: 2.1 {RATIO} (ref 1–2)
ALP LIVER SERPL-CCNC: 165 U/L
ALT SERPL-CCNC: 38 U/L
ANION GAP SERPL CALC-SCNC: 10 MMOL/L (ref 0–18)
AST SERPL-CCNC: 38 U/L (ref ?–34)
BASOPHILS # BLD AUTO: 0.03 X10(3) UL (ref 0–0.2)
BASOPHILS NFR BLD AUTO: 0.3 %
BILIRUB SERPL-MCNC: 0.5 MG/DL (ref 0.3–1.2)
BUN BLD-MCNC: 9 MG/DL (ref 9–23)
BUN/CREAT SERPL: 11.3 (ref 10–20)
CALCIUM BLD-MCNC: 9.6 MG/DL (ref 8.7–10.4)
CHLORIDE SERPL-SCNC: 105 MMOL/L (ref 98–112)
CO2 SERPL-SCNC: 26 MMOL/L (ref 21–32)
CREAT BLD-MCNC: 0.8 MG/DL
D DIMER PPP FEU-MCNC: 0.73 UG/ML FEU (ref ?–0.5)
DEPRECATED RDW RBC AUTO: 43.2 FL (ref 35.1–46.3)
EGFRCR SERPLBLD CKD-EPI 2021: 118 ML/MIN/1.73M2 (ref 60–?)
EOSINOPHIL # BLD AUTO: 0.02 X10(3) UL (ref 0–0.7)
EOSINOPHIL NFR BLD AUTO: 0.2 %
ERYTHROCYTE [DISTWIDTH] IN BLOOD BY AUTOMATED COUNT: 12.6 % (ref 11–15)
GLOBULIN PLAS-MCNC: 2.2 G/DL (ref 2–3.5)
GLUCOSE BLD-MCNC: 118 MG/DL (ref 70–99)
HCT VFR BLD AUTO: 40.8 %
HGB BLD-MCNC: 14.5 G/DL
IMM GRANULOCYTES # BLD AUTO: 0.06 X10(3) UL (ref 0–1)
IMM GRANULOCYTES NFR BLD: 0.5 %
LYMPHOCYTES # BLD AUTO: 0.82 X10(3) UL (ref 1–4)
LYMPHOCYTES NFR BLD AUTO: 7.5 %
MCH RBC QN AUTO: 33.2 PG (ref 26–34)
MCHC RBC AUTO-ENTMCNC: 35.5 G/DL (ref 31–37)
MCV RBC AUTO: 93.4 FL
MONOCYTES # BLD AUTO: 0.2 X10(3) UL (ref 0.1–1)
MONOCYTES NFR BLD AUTO: 1.8 %
NEUTROPHILS # BLD AUTO: 9.85 X10 (3) UL (ref 1.5–7.7)
NEUTROPHILS # BLD AUTO: 9.85 X10(3) UL (ref 1.5–7.7)
NEUTROPHILS NFR BLD AUTO: 89.7 %
OSMOLALITY SERPL CALC.SUM OF ELEC: 292 MOSM/KG (ref 275–295)
PLATELET # BLD AUTO: 231 10(3)UL (ref 150–450)
POTASSIUM SERPL-SCNC: 3.7 MMOL/L (ref 3.5–5.1)
PROT SERPL-MCNC: 6.8 G/DL (ref 5.7–8.2)
RBC # BLD AUTO: 4.37 X10(6)UL
SODIUM SERPL-SCNC: 141 MMOL/L (ref 136–145)
TROPONIN I SERPL HS-MCNC: <3 NG/L
WBC # BLD AUTO: 11 X10(3) UL (ref 4–11)

## 2025-01-17 PROCEDURE — 99285 EMERGENCY DEPT VISIT HI MDM: CPT

## 2025-01-17 PROCEDURE — 84484 ASSAY OF TROPONIN QUANT: CPT

## 2025-01-17 PROCEDURE — 96367 TX/PROPH/DG ADDL SEQ IV INF: CPT

## 2025-01-17 PROCEDURE — 85025 COMPLETE CBC W/AUTO DIFF WBC: CPT

## 2025-01-17 PROCEDURE — 96365 THER/PROPH/DIAG IV INF INIT: CPT

## 2025-01-17 PROCEDURE — 80053 COMPREHEN METABOLIC PANEL: CPT

## 2025-01-17 PROCEDURE — 71260 CT THORAX DX C+: CPT | Performed by: NURSE PRACTITIONER

## 2025-01-17 PROCEDURE — 93005 ELECTROCARDIOGRAM TRACING: CPT

## 2025-01-17 PROCEDURE — 71045 X-RAY EXAM CHEST 1 VIEW: CPT

## 2025-01-17 PROCEDURE — 93010 ELECTROCARDIOGRAM REPORT: CPT

## 2025-01-17 PROCEDURE — 85379 FIBRIN DEGRADATION QUANT: CPT | Performed by: NURSE PRACTITIONER

## 2025-01-17 RX ORDER — AZITHROMYCIN 250 MG/1
TABLET, FILM COATED ORAL
Qty: 6 TABLET | Refills: 0 | Status: SHIPPED | OUTPATIENT
Start: 2025-01-17 | End: 2025-01-22

## 2025-01-17 NOTE — DISCHARGE INSTRUCTIONS
Take the antibiotics as directed  Please follow-up with your primary care doctor this week to have your lungs reassessed  You will likely need a chest x-ray again once your feeling better  Return if you develop new fevers, worsening shortness of breath, or you feel like you are going to pass out

## 2025-01-17 NOTE — ED PROVIDER NOTES
Patient Seen in: [facility] Emergency Department      History     Chief Complaint   Patient presents with    Chest Pain Angina     Stated Complaint: Chest Pain    Subjective:   34-year-old male with history of rheumatoid arthritis and gastroparesis presenting with right-sided chest pain for the past 3 days.  Initially reported experiencing a fever to 103 beats Fahrenheit 3 days ago and then developed a sharp shooting chest pain to the right anterior chest which does radiate to his neck and back.  He also does report dyspnea on exertion.  Still with a cough which is dry and nonproductive.  He denies any history of VTE, acute lower extremity swelling, or further associated symptoms.              Objective:     Past Medical History:    Anxiety state    Brain tumor (HCC)    Rheumatoid arthritis (HCC)    Visual impairment    glasses              Past Surgical History:   Procedure Laterality Date    Cholecystectomy      Colonoscopy N/A 02/17/2022    Procedure: COLONOSCOPY/ESOPHAGOGASTRODUODENOSCOPY (EGD);  Surgeon: Ottoniel Jorgensen MD;  [facility]: [facility] ENDOSCOPY    Knee arthroscopy Left 2009    ACL/MCL repair     Knee replacement surgery Left 08/2018    Other  10/13/2021    SUBOCCIPITAL CRANIECTOMY FOR TUMOR WITH NEURO NAVIGATION, MICROSCOPE DISSECTION AND NEURO MONITORING    Rotator cuff repair Left 11/10/2023    SHOULDER REPAIR                Social History     Socioeconomic History    Marital status:    Tobacco Use    Smoking status: Never    Smokeless tobacco: Never   Vaping Use    Vaping status: Never Used   Substance and Sexual Activity    Alcohol use: Not Currently     Comment: rare    Drug use: No   Other Topics Concern    Caffeine Concern No    Exercise No    Seat Belt No    Special Diet No    Stress Concern No    Weight Concern No     Social Drivers of Health     Food Insecurity: Low Risk  (12/7/2021)    [facility]    [employer]  Insecurity     Have there been times that your food ran out, and you didn't have money to get more?: No     Are there times that you worry that this might happen?: No   Transportation Needs: Low Risk  (12/7/2021)    Received from [facility]    Transportation Needs     Do you have trouble getting transportation to medical appointments?: No   Housing Stability: Low Risk  (12/7/2021)    Received from [facility]    Housing Stability     Are you concerned about having a safe and reliable place to live?: No                  Physical Exam     ED Triage Vitals   BP 01/17/25 1027 131/75   Pulse 01/17/25 1027 78   Resp 01/17/25 1027 16   Temp 01/17/25 1027 98.6 °F (37 °C)   Temp src --    SpO2 01/17/25 1027 98 %   O2 Device 01/17/25 1221 None (Room air)       Current Vitals:   Vital Signs  BP: 122/61  Pulse: 70  Resp: 18  Temp: 98.6 °F (37 °C)  MAP (mmHg): 91    Oxygen Therapy  SpO2: 96 %  O2 Device: None (Room air)        Physical Exam  Vitals and nursing note reviewed.   Constitutional:       Appearance: Normal appearance.   HENT:      Head: Normocephalic.      Right Ear: External ear normal.      Left Ear: External ear normal.      Nose: Nose normal.      Mouth/Throat:      Mouth: Mucous membranes are moist.   Eyes:      Extraocular Movements: Extraocular movements intact.      Conjunctiva/sclera: Conjunctivae normal.      Pupils: Pupils are equal, round, and reactive to light.   Cardiovascular:      Rate and Rhythm: Normal rate and regular rhythm.   Pulmonary:      Effort: Pulmonary effort is normal.      Breath sounds: Examination of the right-middle field reveals rales. Examination of the right-lower field reveals rales. Rales present. No wheezing.   Chest:      Chest wall: Tenderness present.   Abdominal:      Palpations: Abdomen is soft.   Musculoskeletal:         General: Normal range of motion.       Cervical back: Normal range of motion.   Skin:     General: Skin is warm and dry.   Neurological:      Mental Status: He is alert and oriented to person, place, and time.   Psychiatric:         Mood and Affect: Mood normal.         Behavior: Behavior normal.             ED Course     Labs Reviewed   COMP METABOLIC PANEL (14) - Abnormal; Notable for the following components:       Result Value    Glucose 118 (*)     AST 38 (*)     Alkaline Phosphatase 165 (*)     A/G Ratio 2.1 (*)     All other components within normal limits   CBC WITH DIFFERENTIAL WITH PLATELET - Abnormal; Notable for the following components:    Neutrophil Absolute Prelim 9.85 (*)     Neutrophil Absolute 9.85 (*)     Lymphocyte Absolute 0.82 (*)     All other components within normal limits   D-DIMER - Abnormal; Notable for the following components:    D-Dimer 0.73 (*)     All other components within normal limits   TROPONIN I HIGH SENSITIVITY - Normal   RAINBOW DRAW LAVENDER   RAINBOW DRAW LIGHT GREEN   RAINBOW DRAW BLUE     EKG    Rate, intervals and axes as noted on EKG Report.  Rate: 75  Rhythm: Sinus Rhythm. No ST segment elevations or depressions  Reading: No STEMI per my interpretation         CT CHEST PE AORTA (IV ONLY) (CPT=71260)    Result Date: 1/17/2025  PROCEDURE: CT CHEST PE AORTA (IV ONLY) (CPT=71260)  COMPARISON: [facility], XR CHEST AP/PA (1 VIEW) (CPT=71045), 1/17/2025, 11:58 AM.  INDICATIONS: Chest Pain  TECHNIQUE: Multidetector CT images of the chest were obtained with non-ionic intravenous contrast material. Automated exposure control for dose reduction was used. Adjustment of the mA and/or kV was done based on the patient's size. Iterative reconstruction technique for dose reduction was employed. Multiplanar reformats and maximum intensity projection images were created.   FINDINGS: VASCULATURE: There is adequate opacification of the pulmonary arterial tree. No suspicious filling defects are identified in the  main, lobar, segmental, or proximal subsegmental pulmonary artery branches to suggest acute pulmonary embolism. The distal subsegmental branches are less well assessed. The main pulmonary artery trunk is normal in caliber, measuring 2.8 cm. CARDIAC: The heart is not enlarged. There is no bowing of the interventricular septum to suggest right ventricular strain. THORACIC AORTA: Unremarkable configuration without aneurysm or dissection.  LUNGS/PLEURA: Trace dependent right pleural effusion.  Extensive right lower lobe airspace disease with intrinsic air bronchograms and surrounding ground-glass density opacities.  Less pronounced patchy ground-glass density opacities throughout the right  middle and right upper lobes.  Dependent subsegmental atelectasis in the left lung.  No pneumothorax. AIRWAYS: The tracheobronchial tree is without central mass or obstructing lesion. MEDIASTINUM/DEL: Prominent but nonenlarged mediastinal and right hilar lymph nodes, which are probably reactive. CHEST WALL: No axillary mass or lymphadenopathy.  LIMITED ABDOMEN: Within the parameters of the arterial phase of contrast-enhancement, the included upper abdomen is unremarkable.  BONES: Partially imaged left shoulder arthroplasty. OTHER: Small focus of scarring versus small sebaceous cyst overlying the right upper scapula.         CONCLUSION:  1. No evidence of acute pulmonary embolism. 2. Extensive right lower lobe airspace disease with intrinsic air bronchograms and surrounding ground-glass density opacities.  There are less pronounced patchy ground-glass density opacities throughout the right upper and right middle lobes.  Findings are most compatible with multifocal right lung pneumonia.  Follow-up to resolution is advised. 3. Trace dependent right pleural effusion-likely parapneumonic. 4. Prominent but nonenlarged mediastinal and right hilar lymph nodes, which are likely reactive. 5. Lesser incidental findings as above.   elm-remote   Dictated by (CST): Chris Mitchell MD on 1/17/2025 at 1:01 PM     Finalized by (CST): Chris Mitchell MD on 1/17/2025 at 1:09 PM          XR CHEST AP/PA (1 VIEW) (CPT=71045)    Result Date: 1/17/2025  PROCEDURE: XR CHEST AP/PA (1 VIEW) (CPT=71045)  COMPARISON: None.  INDICATIONS: Chest Pain x three days.  TECHNIQUE:   Single PA view.   FINDINGS:  Lung volumes are normal.  There is a moderate to large-sized interstitial infiltrate at the right lung base.  The remainder of the lungs are clear.  The cardiac silhouette, mediastinal contour, and pulmonary vascularity appear grossly normal.          CONCLUSION: Large interstitial infiltrate at the right lung base    Dictated by (CST): Aleksander Hernández MD on 1/17/2025 at 12:08 PM     Finalized by (CST): Aleksander Hernández MD on 1/17/2025 at 12:11 PM                       MDM              Medical Decision Making  Differentials include costochondritis versus pneumonia versus pulmonary embolism versus pneumomediastinum versus other.  Chest x-ray today hide suspicious for pneumonia to the right lower lobe.  D-dimer was sent as well but was elevated.  CT PE negative for pulmonary embolism however most consistent with a multifocal pneumonia to the right lower and middle lobe.  SpO2 is normal.  IV ceftriaxone and azithromycin administered in the ED.  Patient is currently very well-appearing.  Will discharge home with prescriptions for Augmentin and azithromycin.  Patient to follow-up with his PCP this week.  Return precautions discussed.    Amount and/or Complexity of Data Reviewed  Labs: ordered. Decision-making details documented in ED Course.  Radiology: ordered and independent interpretation performed. Decision-making details documented in ED Course.     Details: Chest x-ray negative for pneumothorax per my interpretation  ECG/medicine tests: ordered and independent interpretation performed. Decision-making details documented in ED Course.    Risk  Prescription drug  management.        Disposition and Plan     Clinical Impression:  1. Multifocal pneumonia         Disposition:  Discharge  1/17/2025  2:41 pm    Follow-up:  Terrell Covarrubias MD  [facility] [address]  [place] [address]  [phone]    Follow up in 3 day(s)      We recommend that you schedule follow up care with a primary care provider within the next three months to obtain basic health screening including reassessment of your blood pressure.      Medications Prescribed:  Current Discharge Medication List        START taking these medications    Details   amoxicillin clavulanate 875-125 MG Oral Tab Take 1 tablet by mouth 2 (two) times daily for 7 days.  Qty: 14 tablet, Refills: 0      azithromycin (ZITHROMAX Z-LESIA) 250 MG Oral Tab 500 mg once followed by 250 mg daily x 4 days  Qty: 6 tablet, Refills: 0                 Supplementary Documentation:

## 2025-01-17 NOTE — ED INITIAL ASSESSMENT (HPI)
Pt to ED for right sided chest pain radiating up neck. States he was recently sick and is now having a difficult time catching his breath. A/Ox4, brathing non-labored in triage.

## 2025-01-18 LAB
ATRIAL RATE: 75 BPM
P AXIS: 41 DEGREES
P-R INTERVAL: 132 MS
Q-T INTERVAL: 386 MS
QRS DURATION: 98 MS
QTC CALCULATION (BEZET): 431 MS
R AXIS: 41 DEGREES
T AXIS: 56 DEGREES
VENTRICULAR RATE: 75 BPM

## 2025-01-19 ENCOUNTER — HOSPITAL ENCOUNTER (EMERGENCY)
Facility: HOSPITAL | Age: 35
Discharge: HOME | End: 2025-01-19
Attending: EMERGENCY MEDICINE
Payer: COMMERCIAL

## 2025-01-19 ENCOUNTER — APPOINTMENT (OUTPATIENT)
Dept: GENERAL RADIOLOGY | Facility: HOSPITAL | Age: 35
End: 2025-01-19
Attending: EMERGENCY MEDICINE
Payer: COMMERCIAL

## 2025-01-19 VITALS
RESPIRATION RATE: 18 BRPM | DIASTOLIC BLOOD PRESSURE: 75 MMHG | BODY MASS INDEX: 29 KG/M2 | HEART RATE: 80 BPM | TEMPERATURE: 99 F | SYSTOLIC BLOOD PRESSURE: 117 MMHG | OXYGEN SATURATION: 95 % | WEIGHT: 200 LBS

## 2025-01-19 DIAGNOSIS — J18.9 COMMUNITY ACQUIRED PNEUMONIA OF RIGHT LOWER LOBE OF LUNG: Primary | ICD-10-CM

## 2025-01-19 PROCEDURE — 99283 EMERGENCY DEPT VISIT LOW MDM: CPT

## 2025-01-19 PROCEDURE — 99284 EMERGENCY DEPT VISIT MOD MDM: CPT

## 2025-01-19 PROCEDURE — 71045 X-RAY EXAM CHEST 1 VIEW: CPT | Performed by: EMERGENCY MEDICINE

## 2025-01-19 NOTE — ED PROVIDER NOTES
Patient Seen in: [facility] Emergency Department    History     Chief Complaint   Patient presents with    Difficulty Breathing       HPI    Patient presents to the ED complaining of ongoing shortness of breath and increased pain to his right rib cage.  He was seen 2 days ago and diagnosed with pneumonia of the right lower lobe.  Started on Augmentin and azithromycin.  He states symptoms are no better and slightly worse.  Denies fevers or chills.    History reviewed.   Past Medical History:    Anxiety state    Brain tumor (HCC)    Rheumatoid arthritis (HCC)    Visual impairment    glasses       History reviewed.   Past Surgical History:   Procedure Laterality Date    Cholecystectomy      Colonoscopy N/A 02/17/2022    Procedure: COLONOSCOPY/ESOPHAGOGASTRODUODENOSCOPY (EGD);  Surgeon: Ottoniel Jorgensen MD;  Location: [facility] ENDOSCOPY    Knee arthroscopy Left 2009    ACL/MCL repair     Knee replacement surgery Left 08/2018    Other  10/13/2021    SUBOCCIPITAL CRANIECTOMY FOR TUMOR WITH NEURO NAVIGATION, MICROSCOPE DISSECTION AND NEURO MONITORING    Rotator cuff repair Left 11/10/2023    SHOULDER REPAIR         Medications :  Prescriptions Prior to Admission[1]     Family History   Problem Relation Age of Onset    No Known Problems Father     No Known Problems Mother     No Known Problems Daughter     No Known Problems Son     No Known Problems Maternal Grandmother     No Known Problems Maternal Grandfather     No Known Problems Paternal Grandmother     No Known Problems Paternal Grandfather     No Known Problems Sister     No Known Problems Brother     No Known Problems Other        Smoking Status:   Social History     Socioeconomic History    Marital status:    Tobacco Use    Smoking status: Never    Smokeless tobacco: Never   Vaping Use    Vaping status: Never Used   Substance and Sexual Activity    Alcohol use: Not Currently     Comment: rare    Drug use: No   Other Topics Concern    Caffeine Concern No     Exercise No    Seat Belt No    Special Diet No    Stress Concern No    Weight Concern No       Constitutional and vital signs reviewed.      Social History and Family History elements reviewed from today, pertinent positives to the presenting problem noted.    Physical Exam     ED Triage Vitals [01/19/25 1157]   /75   Pulse 83   Resp 18   Temp 99 °F (37.2 °C)   Temp src Temporal   SpO2 93 %   O2 Device None (Room air)       All measures to prevent infection transmission during my interaction with the patient were taken. Handwashing was performed prior to and after the exam.  Stethoscope and any equipment used during my examination was cleaned with super sani-cloth germicidal wipes following the exam.     Physical Exam  Vitals and nursing note reviewed.   Constitutional:       General: He is not in acute distress.     Appearance: He is well-developed. He is not ill-appearing or toxic-appearing.   HENT:      Head: Normocephalic and atraumatic.   Eyes:      General:         Right eye: No discharge.         Left eye: No discharge.      Conjunctiva/sclera: Conjunctivae normal.   Neck:      Trachea: No tracheal deviation.   Cardiovascular:      Rate and Rhythm: Normal rate.   Pulmonary:      Effort: Pulmonary effort is normal. No respiratory distress.      Breath sounds: Normal breath sounds. No stridor.   Chest:      Chest wall: Tenderness present.      Comments: Tenderness to the right lower anterior lateral chest wall.  No rib deformity.  Abdominal:      General: There is no distension.      Palpations: Abdomen is soft.      Tenderness: There is no abdominal tenderness.   Musculoskeletal:         General: No deformity.   Skin:     General: Skin is warm and dry.   Neurological:      Mental Status: He is alert and oriented to person, place, and time.   Psychiatric:         Mood and Affect: Mood normal.         Behavior: Behavior normal.         ED Course      Labs Reviewed - No data to display    As Interpreted by  me    Imaging Results Available and Reviewed while in ED: XR CHEST AP PORTABLE  (CPT=71045)    Result Date: 1/19/2025  CONCLUSION:  1. Right basilar pleural effusion with atelectasis and or pneumonia in the right mid and lower lung.    Dictated by (CST): Abhishek Aranda MD on 1/19/2025 at 1:57 PM     Finalized by (CST): Abhishek Aranda MD on 1/19/2025 at 1:58 PM         ED Medications Administered: Medications - No data to display      MDM     Vitals:    01/19/25 1157   BP: 117/75   Pulse: 83   Resp: 18   Temp: 99 °F (37.2 °C)   TempSrc: Temporal   SpO2: 93%   Weight: 90.7 kg     *I personally reviewed and interpreted all ED vitals.    Pulse Ox: 93%, Room air, Normal     Monitor Interpretation:   normal sinus rhythm as interpreted by me.  The cardiac monitor was ordered to monitor heart rate.    Differential Diagnosis/ Diagnostic Considerations: Pneumonia, respiratory distress, other    Complicating Factors: The patient already has does not have any pertinent problems on file. to contribute to the complexity of this ED evaluation.    Medical Decision Making  Patient presents to the ED with ongoing shortness of breath and right sided chest wall pain with recent pneumonia diagnosis.  CT done 2 days ago that showed no PE.  Patient afebrile in the ED, vital signs normal.  Chest x-ray with possible slight worsening of pneumonia findings.  O2 saturations normal and no respiratory distress.  Stable for discharge with continued oral antibiotics and precautions.    Problems Addressed:  Community acquired pneumonia of right lower lobe of lung: acute illness or injury    Amount and/or Complexity of Data Reviewed  Radiology: ordered and independent interpretation performed. Decision-making details documented in ED Course.     Details: I personally reviewed the patient's chest x-ray image and noted no pneumothorax        Condition upon leaving the department: Stable    Disposition and Plan     Clinical Impression:  1. Community  acquired pneumonia of right lower lobe of lung        Disposition:  Discharge    Follow-up:  Terrell Covarrubias MD  [address]  [place] [address] [phone]    Schedule an appointment as soon as possible for a visit in 3 day(s)        Medications Prescribed:  Current Discharge Medication List                           [1] (Not in a hospital admission)

## 2025-01-19 NOTE — ED INITIAL ASSESSMENT (HPI)
Dx with HOLLY here at [facility], on Friday    Reports difficulty breathing and pain to R rib cage    Denies fever or chills

## 2025-01-23 NOTE — INTERVAL H&P NOTE
Outpatient Care Management Note:    Re:  insulin    [facility] I called [facility] and spoke with Noelle to further follow up and they do not have patient's insurance info on file and have been trying to reach him since 1/7. Pharmacy confirms that patient did  glucometer and supplies and that was through Medicare part B. Part B would not cover script sent for insulin. Pharmacy was going to reach out again to patient to obtain insurance info. They have my contact number to call back if not covered or needs prior auth.     I did call patient and no answer. Message left that physicians want him taking the 70/30 insulin and script was sent to [facility] pharmacy. I did state pharmacy needs him to call to provide further insurance information so they can further process script. Any further issues with obtaining insulin to call PCP office at [phone] and left my contact number [phone].        Pre-op Diagnosis: biliary dyskinesia    The above referenced H&P was reviewed by Tom Ocampo MD on 5/5/2022, the patient was examined and no significant changes have occurred in the patient's condition since the H&P was performed. I discussed with the patient and/or legal representative the potential benefits, risks and side effects of this procedure; the likelihood of the patient achieving goals; and potential problems that might occur during recuperation. I discussed reasonable alternatives to the procedure, including risks, benefits and side effects related to the alternatives and risks related to not receiving this procedure. We will proceed with procedure as planned.

## 2025-03-24 ENCOUNTER — TELEPHONE (OUTPATIENT)
Age: 35
End: 2025-03-24

## 2025-03-24 NOTE — TELEPHONE ENCOUNTER
Received fax from [facility] asking for updated clinicals, progress notes, and labs as patient's [employer] authorization is expiring soon. Last office visit note and most recent labs faxed.

## 2025-04-08 ENCOUNTER — TELEPHONE (OUTPATIENT)
Age: 35
End: 2025-04-08

## 2025-04-08 DIAGNOSIS — Z51.81 THERAPEUTIC DRUG MONITORING: ICD-10-CM

## 2025-04-08 DIAGNOSIS — M06.9 RHEUMATOID ARTHRITIS, INVOLVING UNSPECIFIED SITE, UNSPECIFIED WHETHER RHEUMATOID FACTOR PRESENT (HCC): Primary | ICD-10-CM

## 2025-04-09 ENCOUNTER — LAB ENCOUNTER (OUTPATIENT)
Dept: LAB | Age: 35
End: 2025-04-09
Attending: INTERNAL MEDICINE
Payer: COMMERCIAL

## 2025-04-09 ENCOUNTER — TELEMEDICINE (OUTPATIENT)
Age: 35
End: 2025-04-09
Payer: COMMERCIAL

## 2025-04-09 DIAGNOSIS — M06.9 RHEUMATOID ARTHRITIS, INVOLVING UNSPECIFIED SITE, UNSPECIFIED WHETHER RHEUMATOID FACTOR PRESENT (HCC): Primary | ICD-10-CM

## 2025-04-09 DIAGNOSIS — M25.572 CHRONIC PAIN OF LEFT ANKLE: ICD-10-CM

## 2025-04-09 DIAGNOSIS — M06.9 RHEUMATOID ARTHRITIS, INVOLVING UNSPECIFIED SITE, UNSPECIFIED WHETHER RHEUMATOID FACTOR PRESENT (HCC): ICD-10-CM

## 2025-04-09 DIAGNOSIS — Z51.81 THERAPEUTIC DRUG MONITORING: ICD-10-CM

## 2025-04-09 DIAGNOSIS — G89.29 CHRONIC PAIN OF LEFT ANKLE: ICD-10-CM

## 2025-04-09 LAB
ALT SERPL-CCNC: 23 U/L (ref 10–49)
AST SERPL-CCNC: 21 U/L (ref ?–34)
CREAT BLD-MCNC: 0.85 MG/DL (ref 0.7–1.3)
CRP SERPL-MCNC: <0.4 MG/DL (ref ?–1)
DEPRECATED RDW RBC AUTO: 43.1 FL (ref 35.1–46.3)
EGFRCR SERPLBLD CKD-EPI 2021: 115 ML/MIN/1.73M2 (ref 60–?)
ERYTHROCYTE [DISTWIDTH] IN BLOOD BY AUTOMATED COUNT: 12.3 % (ref 11–15)
ERYTHROCYTE [SEDIMENTATION RATE] IN BLOOD: 1 MM/HR (ref 0–15)
HCT VFR BLD AUTO: 42.1 % (ref 39–53)
HGB BLD-MCNC: 14.5 G/DL (ref 13–17.5)
MCH RBC QN AUTO: 33.3 PG (ref 26–34)
MCHC RBC AUTO-ENTMCNC: 34.4 G/DL (ref 31–37)
MCV RBC AUTO: 96.8 FL (ref 80–100)
PLATELET # BLD AUTO: 189 10(3)UL (ref 150–450)
RBC # BLD AUTO: 4.35 X10(6)UL (ref 4.3–5.7)
WBC # BLD AUTO: 5.8 X10(3) UL (ref 4–11)

## 2025-04-09 PROCEDURE — 98006 SYNCH AUDIO-VIDEO EST MOD 30: CPT | Performed by: INTERNAL MEDICINE

## 2025-04-09 PROCEDURE — 82565 ASSAY OF CREATININE: CPT

## 2025-04-09 PROCEDURE — 85027 COMPLETE CBC AUTOMATED: CPT

## 2025-04-09 PROCEDURE — 85652 RBC SED RATE AUTOMATED: CPT

## 2025-04-09 PROCEDURE — 84450 TRANSFERASE (AST) (SGOT): CPT

## 2025-04-09 PROCEDURE — 36415 COLL VENOUS BLD VENIPUNCTURE: CPT | Performed by: INTERNAL MEDICINE

## 2025-04-09 PROCEDURE — 86140 C-REACTIVE PROTEIN: CPT

## 2025-04-09 PROCEDURE — 86480 TB TEST CELL IMMUN MEASURE: CPT | Performed by: INTERNAL MEDICINE

## 2025-04-09 PROCEDURE — 84460 ALANINE AMINO (ALT) (SGPT): CPT

## 2025-04-09 RX ORDER — PREDNISONE 2.5 MG/1
7.5 TABLET ORAL DAILY
Qty: 270 TABLET | Refills: 3 | Status: SHIPPED | OUTPATIENT
Start: 2025-04-09

## 2025-04-09 RX ORDER — LEFLUNOMIDE 10 MG/1
10 TABLET ORAL DAILY
Qty: 90 TABLET | Refills: 1 | Status: SHIPPED | OUTPATIENT
Start: 2025-04-09

## 2025-04-09 RX ORDER — METHOTREXATE 2.5 MG/1
TABLET ORAL
Qty: 113 TABLET | Refills: 1 | Status: SHIPPED | OUTPATIENT
Start: 2025-04-09

## 2025-04-09 NOTE — PATIENT INSTRUCTIONS
1   cont. actemra 8mg/kg every 4 weeks   2.  Cont. Methotrexate 9 tablets a week - .Cont. Folic acid 1mg a day   3. Cont. Leflunomide 10mg a day -   4. Cont.  Prednisone - taper from  10mg a day -to 7.5mg a day   5. Check labs today  and then every 3months  6. Return to clinic in 3 months.   7. meloxicam 15mg a day   8. Norco bid prn - 1-2 daily prn   9. Try diet changes with turmeric, calcium /vit d - and meditterena diet   10. Left ankle ortho- and left ankle xray - if the xray is showing a lot of osteaorthriits - I can refer you to an ortho -

## 2025-04-09 NOTE — PROGRESS NOTES
Domingo George is a 34 year old male who presents for   No chief complaint on file.    This visit is conducted using Telemedicine with live, interactive video and audio.    Patient has been referred to the [facility] website at [email or web address] to review the yearly Consent to Treat document.    Patient understands and accepts financial responsibility for any deductible, co-insurance and/or co-pays associated with this service.      HPI:     I had the pleasure of seeing Domingo George on 2/15/2020 for evaluation.    He is a pleasant 29 year old who had seropositive RA - since age 14. His rheumatologist is retiring and he is re-establishing care.   He was diagnosed in [place]. He started getting pain and swellign in his left knee. His other joitns - his left wrist is gradaully affected. And his 5th fingers - he can't bend now.   He is on methotrexate 20mg a week, fa and [id number] a day.   He has taken prednisone as needed. He still gets swelling around his left knee. He can walk with the left knee replacement and still gets swelling.   He had torn his ACL and MCL in his lef tknee prior ot that. Then on MRI of his left knee in 2018 he was found to have AVN.     He used to be on Enbrel but couldn't stay on it b/c of the cost. It's been a year since he was on it.  He has new insurance now.   He  started on humira when he was first diagnosed but it was too expensive. He also would get injection site burning senstaion.  But then he also was on iv medication. He can't remember if it was remicade. Overall it became  Expensive. Then he was on enbrel for around 4 years around age 19. . Then around 24 he started having issues with being on biologics b/c of the expensive of the medication. It's been off and on since.   He hasn't seen dr. martin since after his left knee surgery. It;s been a long recovery for him. He hasn't been back to see her. He just coudnlt' seen her in the hours she had.     He has gradually  increased back pain. When his back acts up he gets shortness of breath. If hes' lifting or bending, his back can get tight. Not much pain when laying down. 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   His right knee on occasion can hurt. And occl his elbows. occl his right ankle bothers him. No hip pain.   He has pain in his upper back.     He's not been on any other medication.   His eyes have gotten red and hurt. He has never seen an eye doctor. He's going to see an eye doctor.   No hx of psoriasis.     He sees dr. velazquez for a few years. He has been filling the methotrexate.   He feels some days are better than others.   He mainly has pain in his elbows, right knee and occl his hands.       In 2018, he had left knee arthroplasty with Dr. Schmitt.     6/1/2020  He forgot to get labs - he is going to try to goto tomorrow.   He misplaced the orders. He still wants to try enbrel  So he is just on methotrexate 20mg a week and meloxicam. He feels pretty good. He has his ups and downs.   He has about 2-3/10.   His right knee and his hands hurt a litlte bit.   It's manageable. He feels the weather fluctuations flare him.   No flares.   He's had a few flares - little ones.   He has about 1 hours of morning stiffness   His left side of neck gets stiff in the hims  He has mild elbow pain as well.   He has a lot of stress with work - and he works for [employer]     9/1/2020   He just had his labs done yesterday at [facility] - it was in [place] -   The methotrexate he's good. He feels pretty good and he has goo and bad days. He feels his pain is 2/10. He feels the weather and what he is doing matters.   He still wants to start enbrel. He hasn't started yet.   He feels still 1 hour of morning stiffnes. He feels sitting for long period times .   He feels latealy his elbows are acting up more often and his wrists.   He has limitation in range of mtion of his joitns.   He has noticed his neck at the base  - it hurts more  often. He has migraines so doesn't know if it's 'related.   He has good rom. It acts up now and then.   He takes pain meds - but limits it if the pain is really bad.     2/20/2021  He waited to start enbrel b/c of covid.   His wife got covid in 12/2020 and he thinks hemight have had it.   He is feeling worse and worse by the day.   He is having about 5/10 pain. It' snot good.   He has pain on his left shoulder , hands , feet , ankles and right knee.   He has left elbow pain. He can't straighten that.     5/15/2021  He has 5/10 pain . He has a lot of discomfrot an pain.   He is expecting his 2nd baby in 11/10/2021   His feet , ankles and right knee hurt pretty bad. He saw dr. 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   He has pian in his hands, left shouldera and right hip.   He is not started the enbrel since last time. He's not gotten a call.   He did change insurance at the beginning of the year nad it looks like the [facility].   He is truding along.     8/21/2021  He's not started the enbrel yet.   He states he didn't get a phone call. He also doesn't check my chart.   He is losing weight and appetitis. If he eats then he has diarrhea or vomitting.   He didn't do his labs.   He has bad left shoulder and ankle pain.   He is having 8/10 pain.   The kenalog shot helped for 1 week, then the medrol santiago helped for 1 week.     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     He had bad falre up in rehab. He couldn't walk b/c of his arthrits.   He had started prednisone in end of October. Now at home he's on prednisone 5mg a day.   He was given trexall 2 tablets - 10mg each - 20mg every week -   He last took 2 weeks ago. He missed this Saturday.   The flares up s were better til last week.   He's been on enbrel in beg of 11/2021  No trouble with the shots.   The pain gets to 7/10  pain. - feet, left knee, left hand and shoulder.     He's done with physical therapy - on 11/24/2021 -   hes' walking with a walker - using it mostly for balance.   It's not the pain. He's praciit=icing     3/10/2022  hes' having right knee swellign and it hurts and his hands hurt.   He cant' use his left shoulder.   He ahs 10/10 pain.   He actually feels worse than his last visit.   He's in a wheel chair to get around.   He has lost a lot of weight. He's vomitting after eating.   He doesn't feel the medication is making him nauseaous. He has lost 73 pouns -   He had a colonsocopy and EGD - and he's throwing up all the time. He's doing this since last year.   Removing the tumor did not help.   He took the medrol santiago and that helps him efor tat week.     7/5/2022  He hasn't been doing orencia b/c he was told it was denied.   He was dx with gastropereisis and had cholecystecomy.   evern since it's removed, he is able to be removed but it's still hurting.   It's not hurting.   He's vomitting - still post cck, it's not daily.   He throws up a couple of ton a week     He has ongoing right hip pain - right hsoulder and right wrist pain.   He has the major area of pain.   The right knee has been ok -   He's on prednison e10mg a day - , methtorexate 8 tablets a week   Off enbrel since 3/2022 -     No fevers.   Post surgery he has no pain.   All his pain is joint pain.     He's taking this norco post surgery - he's taking 1 daily -   [id number] - breaks them in half and takes 1/2 tablets.     No diarrhea -   He feels fine - he's not eating or over doing it - he sees nurtioninst as well.     He lost 15lbs.       8/8/2022  He's been taking orecia for the last 4 weeks.   He is also musa cammy prednisone 20mg ad starr.   He rhas 5-6/10 pain . He is using a cane.   Hthe pain is much better than what it was.   His eating is a lot better.   He has gained weight. He has 20 pound weight gain. He's able to keep it down.   He has no side  effects with hte prednisone.   The orencia feels better than the enbrel in terms of injection.   His right hip, right wrist and right shoulder is better but still pain.   His right knee is acting up right now.   He's trying to push himslef to exercise more.   Started orencia weekly in 7/2022 -     10/10/2022  He doesn't need a cane. He feels his weight is getting better.   His hands , feet and shoulders hurt really badyly and it's hard for him to  things now.   He has a bout 7/10 pain.   He has a hard time closing his hands.   The orencia is on for about 3 months now.   He feels the waether changing is playing a factor in more achyiness.   No stomach pain.   His stomach is better - rarely any vomitting.   He does have more pain than his last visit - thinks it's the weather.   He's still on pred 20mg a day, orencia 125mg a week and methtorexate 20mg a week.    2/1/2023  His pain is 7/10 pain. His sed rate was 71mm/hr. I increased his pred to 30mg a day    His left shoulder is really hard to lift. definietly the pain spikes up more.   He is tolerating the ssz but he's not really noticing a differnce with it.   He's not vomitting as much as he used to.   He still has balance if off. He's trying to walk without the cane. He's off physical therapy.   He can close his hands. He still can't  things well.     4/11/2023  He has been off orencia and not started rinvoq yet. He was approved but there was a snafu on using the savings card and he was billed $3000.   He has pain in his hands and feet and ankles.   He is worse off biologic at this points.   He's on methorexate 9 tablets a week and prednisone 20mg a day.   He has 7/10 pain . He has more pain off orencia.   He has gained weight.     His left shoulder injection only lasted a few weeks.   He can't move it mouch.   He needs help getting dressed.   He's applying for disaiblity.     6/12/2023   He checked with the [employer] rep and then his pharmacy - there eis  nothing on his bill now.   He hasn't started rinvoq yet - he is very worried the same thing is going to happen when he gets the next month and he can't get in touch with anyone who can assure him it won't.   He has 8/10 pain - he has alot of left shoulder wrists, and back. He has back spasms and difficulty breathing when that happenes.     He hasn't been working for 1 year - and appliying for disability   He still has issue with his gastroparesis but not as severe as it was   He's on pred 20mg a day, methotrexate 9 tablets a week and ssz 500mg bid.     8/14/2023  He got the assitance card, and he scheduled the shipment.   He is not on actemra yet - saw PA on 6/14/2023 - and he finally is getting it.   His back and left shoulder is huritng badly - his ankles and hands are hurting as well.   He is still badly having phi apin. He is walking without assitance still.   There hasn't been any change since his last viist.   His stomach is stable.   He's on pred 20mg a day, methotrexate 9 tablets a week and ssz 500mg bid.     10/23/2023  He has been on actemra - and he feels no different with it.   No side effects.   He's till on pred 20g a day.   No issues with billing.   He's having surgery on 11/10.   His walking I horrible. Hisi ankles are painful. He ha 01/10 apin.   Hi knes are finge.   Hi hands , elbows and left shoulder is bad.   He's getting surgery with dr. Clements at [facility]    1/29/2024  He had left shoulder surgery on 11/10/2023 he is doing ok with this.   His feet and ankles  and hands and left elbow is horrbile. .   He does feel the actemra is keeping it at bay.   Since novemeber he's on prednisone 10mg a day he's handling it.  His feet can hurt on walking - 7/10 pain.   Getting eye surgery in 5/2023 - to improved the double vision and misaligngment in his left eye.   No covid, no infections since his last visit.     4/10/2024  He felt he was sick with flu - and held his meds for 1 week   He did noticed  it was helping with the adding the leflunomide 10mg ad ay.   He's on actemra 162mg aw [place],   The last few days his left elbows and bialteral ankles hurt a lot.   He also cut the grass yesterday  - he is sore for a couple of days.     Approved for disability -     7/10/2024  He is getting the actemra - infusions now - got it 6/2024 - the infusion - and it's helping   Missed 5/2024 - injections due to insurance switch   He feels ok with it.   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 But today - his left ankle is hurting 8/10 pain.   Weather changes are ok.   He still has double vision - his balance has improved after 5/2024 - sx.   He is stable but overall he has his pains -   He would like a left anlke injection today b/c he has a wedding coming up this weekend.     4/9/2025  VIDEO VISIT  The actemra is working better than other meds   His pain is not as high as normal.   His left ankle is still really bad -   Walk with a limp now - most of the day it hurst a lot.   His pain level is 5/10 pain   He's on norco 7.5 twicea day - taking it as needed   He breaks it in half -   His left ankle hurts the most - but has other pains. In hands, and knees.       Wt Readings from Last 2 Encounters:   01/19/25 200 lb (90.7 kg)   01/17/25 200 lb (90.7 kg)     There is no height or weight on file to calculate BMI.      Current Outpatient Medications   Medication Sig Dispense Refill    HYDROcodone-acetaminophen (NORCO) 7.5-325 MG Oral Tab Take 1 tablet by mouth every 4 (four) hours as needed for Pain. 60 tablet 0    tiZANidine 2 MG Oral Tab Take 1  tablet (2 mg total) by mouth every 6 (six) hours as needed. 30 tablet 1    Omeprazole 40 MG Oral Capsule Delayed Release Take 1 capsule (40 mg total) by mouth before breakfast. 90 capsule 1    folic acid 1 MG Oral Tab TAKE 1 TABLET BY MOUTH EVERY DAY 90 tablet 1    predniSONE 10 MG Oral Tab Take 1 tablet (10 mg total) by mouth daily. 90 tablet 1    butalbital-acetaminophen-caffeine -40 MG Oral Tab Take 1 tablet by mouth every 4 (four) hours as needed for Pain. 30 tablet 1    MELOXICAM 15 MG Oral Tab TAKE 1 TABLET (15 MG TOTAL) BY MOUTH DAILY. 30 tablet 5    methotrexate 2.5 MG Oral Tab TAKE 9 TABLETS BY MOUTH ONCE WEEKLY. 108 tablet 1    leflunomide 10 MG Oral Tab Take 1 tablet (10 mg total) by mouth daily. 90 tablet 1    Tocilizumab (ACTEMRA ACTPEN) 162 MG/0.9ML Subcutaneous Solution Auto-injector Inject 162 mg into the skin every 7 days. 4 each 5    Multiple Vitamins-Minerals (MULTI-VITAMIN/MINERALS) Oral Tab Take 1 tablet by mouth daily.        Past Medical History:    Anxiety state    Brain tumor (HCC)    Rheumatoid arthritis (HCC)    Visual impairment    glasses      Past Surgical History:   Procedure Laterality Date    Cholecystectomy      Colonoscopy N/A 02/17/2022    Procedure: COLONOSCOPY/ESOPHAGOGASTRODUODENOSCOPY (EGD);  Surgeon: Ottoniel Jorgensen MD;  [facility] ENDOSCOPY    Knee arthroscopy Left 2009    ACL/MCL repair     Knee replacement surgery Left 08/2018    Other  10/13/2021    SUBOCCIPITAL CRANIECTOMY FOR TUMOR WITH NEURO NAVIGATION, MICROSCOPE DISSECTION AND NEURO MONITORING    Rotator cuff repair Left 11/10/2023    SHOULDER REPAIR      Family History   Problem Relation Age of Onset    No Known Problems Father     No Known Problems Mother     No Known Problems Daughter     No Known Problems Son     No Known Problems Maternal Grandmother     No Known Problems Maternal Grandfather     No Known Problems Paternal Grandmother     No Known Problems Paternal Grandfather     No Known Problems  Sister     No Known Problems Brother     No Known Problems Other    no arthritis, 1 sister,    Social History:  Social History     Socioeconomic History    Marital status:    Tobacco Use    Smoking status: Never    Smokeless tobacco: Never   Vaping Use    Vaping status: Never Used   Substance and Sexual Activity    Alcohol use: Not Currently     Comment: rare    Drug use: No   Other Topics Concern    Caffeine Concern No    Exercise No    Seat Belt No    Special Diet No    Stress Concern No    Weight Concern No     Social Drivers of Health     Food Insecurity: Low Risk  (12/7/2021)    Received from [facility]    Food Insecurity     Have there been times that your food ran out, and you didn't have money to get more?: No     Are there times that you worry that this might happen?: No   Transportation Needs: Low Risk  (12/7/2021)    Received from [facility]    Transportation Needs     Do you have trouble getting transportation to medical appointments?: No   Housing Stability: Low Risk  (12/7/2021)    Received from [facility]    Housing Stability     Are you concerned about having a safe and reliable place to live?: No      , 1 year old   Manager at [employer]     REVIEW OF SYSTEMS:   Review Of Systems:  Fatigue  Constitutional:No fever, no change in weight or appetitie  Derm: No rashes, no oral ulcers, no alopecia, no photosensitivity, no psoriasis  HEENT: No dry eyes, no dry mouth, no Raynaud's, no nasal ulcers, no parotid swelling, occ - neck pain, no jaw pain, no temple pain  Eyes: No visual changes,   CVS: No chest pain, no heart disease  RS: No SOB, no Cough, No Pleurtic pain,   GI: No nausea, no vomiiting, no abominal pain, no hx of ulcer, no gastritis, no heartburn, no dyshpagia, no BRBPR or melena  He has a lot of pain with eating bad food - he doesn't have a good diet. He's having his gall bladder checked out.   : no dysuria, ,    Neuro: No numbness or tingling, gets migraines often - since age 18 he gets  headache, no hx of seizures,   Psych: no hx of anxiety or depression  ENDO: no hx of thyroid disease, no hx of DM  Joint/Muscluskeltal: see HPI,   All other ROS are negative.     EXAM:   There were no vitals taken for this visit.  HEENT:  EOM intact, clear sclear, PERRLA, pleasant, no acute distress, no CAD, no neck tendnerness, good ROM,   No rashes  Thin cachectic   CVS:chest symmetrical  RS: no dyspnea  ABD: Soft appearing  Joint exam   Can't close his hands like his left 1st aand 5th fingers. Arlet his let 3rd finger - , better in right hand.   Tender more oin left 2nd and 3rd mcps with chronic swelling.   Tender in left 4th pip joint with flexion   Chronic +1-2swelling in right 2nd and 3rd mcps with tenderness and left+1 2nd and 3rd mcps with tenerness - improving   +chronic swelling in 3-5th mcps and 1-5th pips - improved.   Ulnar deviation of right 5th finger   Ulnar deviation of left 2nd finger noted with dip joitn changes  Left wrist mild +1 swelling with fusion   Right wrist mild -swellign , almost ankyloised.   B/l elbows tender, arlet left elbows.  - with 15- 30 degree flexion deformities in left elbow   Left shoulder resplaced - improved abduction.   Right knee mild  Tender, mild   swleling - this has improved   Left knee +1 [place]. , tender   No neck tenderness at this time.   Right ankle mild swelling and tender   Left ankle +1 swelling tender.   bl feet squeeze test positive   Right hip not tender - decreased ext rotation by 15 degrees   No edema    Component      Latest Ref Rng & Units 8/31/2020   Glucose      65 - 99 mg/dL 108 (H)   BUN      7 - 25 mg/dL 10   CREATININE      0.60 - 1.35 mg/dL 0.84   eGFR NON-AFR. AMERICAN      > OR = 60 mL/min/1.73m2 117   eGFR AFRICAN AMERICAN      > OR = 60 mL/min/1.73m2 135   Bun/Creatinine Ratio      6 - 22 (calc) NOT APPLICABLE   SODIUM, SERUM      135 - 146 mmol/L 141   POTASSIUM,  SERUM      3.5 - 5.3 mmol/L 3.7   CHLORIDE, SERUM      98 - 110 mmol/L 101   CARBON DIOXIDE      20 - 32 mmol/L 32   CALCIUM      8.6 - 10.3 mg/dL 9.7   PROTEIN, TOTAL      6.1 - 8.1 g/dL 7.4   Albumin      3.6 - 5.1 g/dL 4.5   Globulin, Total      1.9 - 3.7 g/dL (calc) 2.9   ALBUMIN/GLOBULIN RATIO      1.0 - 2.5 (calc) 1.6   Total Bilirubin      0.2 - 1.2 mg/dL 0.3   Alkaline Phosphatase      36 - 130 U/L 114   AST      10 - 40 U/L 16   ALT (SGPT)      9 - 46 U/L 20   WHITE BLOOD CELL COUNT      3.8 - 10.8 Thousand/uL 9.6   RED BLOOD CELL COUNT      4.20 - 5.80 Million/uL 4.46   Hemoglobin      13.2 - 17.1 g/dL 14.4   Hematocrit      38.5 - 50.0 % 41.4   MCV      80.0 - 100.0 fL 92.8   MCH      27.0 - 33.0 pg 32.3   MCHC      32.0 - 36.0 g/dL 34.8   RDW      11.0 - 15.0 % 11.9   Platelet Count      140 - 400 Thousand/uL 238   MPV      7.5 - 12.5 fL 11.7   QUANTIFERON(R)-TB GOLD PLUS, 1 TUBE      NEGATIVE NEGATIVE   NIL      IU/mL 0.02   MITOGEN-NIL      IU/mL >10.00   TB1-NIL      IU/mL 0.00   TB2-NIL      IU/mL 0.00   HEPATITIS C ANTIBODY      NON-REACTIVE NON-REACTIVE   SIGNAL TO CUT-OFF      <1.00 0.02   C-REACTIVE PROTEIN      <8.0 mg/L 10.4 (H)   SED RATE BY MODIFIED$WESTERGREN      < OR = 15 mm/h 14   EDWARD SCREEN, IFA      NEGATIVE NEGATIVE   HEPATITIS B CORE AB TOTAL      NON-REACTIVE NON-REACTIVE   HBSAg Screen      NON-REACTIVE NON-REACTIVE   RHEUMATOID FACTOR      <14 IU/mL 27 (H)   CYCLIC CITRULLINATED$PEPTIDE (CCP) AB (IGG)      UNITS 120 (H)   VITAMIN D, 25-OH, TOTAL      30 - 100 ng/mL 26 (L)     Component      Latest Ref [facility] 4/9/2025   WBC      4.0 - 11.0 x10(3) uL 5.8    RBC      4.30 - 5.70 x10(6)uL 4.35    Hemoglobin      13.0 - 17.5 g/dL 14.5    Hematocrit      39.0 - 53.0 % 42.1    MCV      80.0 - 100.0 fL 96.8    MCH      26.0 - 34.0 pg 33.3    MCHC      31.0 - 37.0 g/dL 34.4    RDW      11.0 - 15.0 % 12.3    RDW-SD      35.1 - 46.3 fL 43.1    Platelet Count      150.0 - 450.0 10(3)uL 189.0     CREATININE      0.70 - 1.30 mg/dL 0.85    EGFR      >=60 mL/min/1.73m2 115    AST (SGOT)      <34 U/L 21    C-REACTIVE PROTEIN      <1.00 mg/dL <0.40    SED RATE      0 - 15 mm/Hr 1    ALT (SGPT)      10 - 49 U/L 23        11/28/2014 - right wrist xray   1. No acute disease.  2. Old fracture fifth metacarpal.  3. Degenerative cysts lunate bone.    1/11/2014 - left hand xray   CONCLUSION:  1. Advanced arthritic changes about the wrist consistent with rheumatoid     arthritis with fusion of multiple carpal bones as well as     metacarpocarpal joints. There has been some progression of disease when     compared to June 20, 2011.  2. Some progression of disease at the first CMC joint.  3. The rest of the findings involving the interphalangeal and MCP joints     relatively stable.    1/11/2014 - chest xray   CONCLUSION:  Normal examination    4/18/218  Left knee MRI    ASSESSMENT AND PLAN:   Domingo George is a 34 year old male who presents for   No chief complaint on file.    1. 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 But slowly improving -   Maintaining  actemra 162mg every  weeks. - since 8/14/2023 to 4/2024 -   Switched actemra infusion 8mg/kg every month - in 6/2024 -   Missed 5/2024 - injections.   But overall doing much better with infusion - it's working the best for him.   The ankle injection helped but refer to ortho to help manage his advanced RA changes in it.    Off  ssz 500mg twice a day  since his stomach is bad -   Cont. Prednisone 10mmg a day -finally able to taper lower than 20mg! - now he wll try to taper to 7.5mg a day   - cont. methotrexate  9 tablets a week - and fa 1mg aday   - cont.  lefluomide 10mg a day to help taper  the prednisone some more   Having surgery on left eye on may 2nd -   Norco bid prn    -   quantiferon gold 10/2022 negative. - repeat sooon  Left shoulder injectoins s/p steroid injection in 2/2023 -   Approved for disability   May 2024 - getting left eye surgery -   - b/c of flare of all joitnsmuch better on  prednisoen to 20mg a day  - will cont this for now    Weight  Loss is better t - to 119 lbs--> 140lbs --. 180lbs --> 191lbs -> 202lbs --> 211lbs.   Cont. meloxicam 15mg prn   Cont. norco prn   Labs today   rtc in 2m onths.     In the past:   Did not do better on  orencia 125mg a week since 7/2022 - but now off since 2/2023 -   He has not been able to start rinvoq - he is worried that he will get billed several thousands of dollars after 6montsh of using the copay card and had difficulty resolved a bill for $3000 after tyring to use the copay card -   Will not use rinvoq at this time.   Increasing pain was putting off enbrel x 11 months b/c of covid and sugery but now restarted on 11/5/2021 -   . enbrel 50mg a week - restarted 11/2021 - ok to start this b/c the brain tumor was not cancerous til now 3/10/2022 - not helping at all over 4 months -   He stopped enbrel in 3/2022   He was to orencia 125mg a week - in 4/2022 - but he never started = started in 7/2022         For right knee pain   S/p right knee RA and OA on 3/2022 -   Aspirate 75cc right knee fluid     - s/p right knee injections - on 8/2021 , 3/10/2022  Try to avoid prednisone with his hx of AVN of left knee   - tried and had to stop enbrel, humira and remicade in the past   -     2. Left knee arthroplasty in 2018 - hx of AVN of left knee     3. Got covid vaccine in June 2021 -  4. 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 He has to wear glasses for 3 months. - his vision is not good b/c of this.   He is following with neuropthalmolgoist - surgery is the last option -     5. Gastroparesis - f/u GI  - improving - watching his diet   Did well - did have recurrence - and saw GI in 5/2024 - zofran, omeprazole,   20% emptiyging in 4 hours -   - on omeprazole -and zofran     6. 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 Left shoulder rotator cuff resurfacing   - Left Shoulder Open partial resurfacing of the humeral head, Open rotator cuff repair, Open biceps subpectoral tenodesis on 11/10/2023 -   Like a partial replacement -     11. Chronic prednisone - calcium vit 1 tablet -   12. Left ankel pain f- chronci pain s/p left ankle injection in 10/2024 -     Chronic and very painful - refer to ortho     Summary:  1   cont. actemra 8mg/kg every 4 weeks   2.  Cont. Methotrexate 9 tablets a week - .Cont. Folic acid 1mg a day   3. Cont. Leflunomide 10mg a day -   4. Cont.  Prednisone - taper from  10mg a day -to 7.5mg a day   5. Check labs today  and then every 3months  6. Return to clinic in 3 months.   7. meloxicam 15mg a day   8. Norco bid prn - 1-2 daily prn   9. Try diet changes with turmeric, calcium /vit d - and meditterena diet   10. Left ankle ortho- and left ankle xray - if the xray is showing a lot of osteaorthriits - I can refer you to an ortho -         Israel Hyatt MD  4/9/2025   2:33 PM         is applicable because the patient's medical record notes reflects the ongoing nature of the continuous longitudinal relationship of care, and the medical record indicates that there is ongoing treatment of a serious/complex medical condition.      Please note that the following visit was completed using two-way, real-time interactive audio and/or video communication.  This was done with patient consent.   This has been done in good mode to provide continuity of care in the best interest of the provider-patient relationship, due to the ongoing public  health crisis/national emergency and because of restrictions of visitation.  There are limitations of this visit as no physical exam could be performed.  Every conscious effort was taken to allow for sufficient and adequate time.  This billing was spent on reviewing labs, medications, radiology tests and decision making.  Appropriate medical decision-making and tests are ordered as detailed in the plan of care above

## 2025-04-11 LAB
M TB IFN-G CD4+ T-CELLS BLD-ACNC: 0.04 IU/ML
M TB TUBERC IFN-G BLD QL: NEGATIVE
M TB TUBERC IGNF/MITOGEN IGNF CONTROL: >10 IU/ML
QFT TB1 AG MINUS NIL: -0.02 IU/ML
QFT TB2 AG MINUS NIL: 0 IU/ML

## 2025-04-12 ENCOUNTER — HOSPITAL ENCOUNTER (OUTPATIENT)
Dept: GENERAL RADIOLOGY | Age: 35
Discharge: HOME | End: 2025-04-12
Attending: INTERNAL MEDICINE
Payer: COMMERCIAL

## 2025-04-12 DIAGNOSIS — M06.9 RHEUMATOID ARTHRITIS, INVOLVING UNSPECIFIED SITE, UNSPECIFIED WHETHER RHEUMATOID FACTOR PRESENT (HCC): ICD-10-CM

## 2025-04-12 DIAGNOSIS — G89.29 CHRONIC PAIN OF LEFT ANKLE: ICD-10-CM

## 2025-04-12 DIAGNOSIS — M25.572 CHRONIC PAIN OF LEFT ANKLE: ICD-10-CM

## 2025-04-12 PROCEDURE — 73610 X-RAY EXAM OF ANKLE: CPT | Performed by: INTERNAL MEDICINE

## 2025-04-15 ENCOUNTER — TELEPHONE (OUTPATIENT)
Age: 35
End: 2025-04-15

## 2025-04-15 NOTE — TELEPHONE ENCOUNTER
Received fax from [facility] requesting new orders for Actemra as current infusion orders have . Form filled out and signed by provider. all clinicals and new orders faxed to [facility] at [phone]

## 2025-04-29 NOTE — TELEPHONE ENCOUNTER
Requested Prescriptions     Pending Prescriptions Disp Refills    MELOXICAM 15 MG Oral Tab [Pharmacy Med Name: MELOXICAM 15 MG TABLET] 30 tablet 5     Sig: TAKE 1 TABLET (15 MG TOTAL) BY MOUTH DAILY.     LOV: 4/9/25  Future Appointments   Date Time Provider Department Center   5/6/2025  9:20 AM Fauzia Oseguera APRN [facility]   7/1/2025 11:00 AM Israel Hyatt MD [facility]   8/27/2025 11:00 AM Amy Mcdonough MD [facility]     Labs:   Component      Latest Ref Rng 4/9/2025   WBC      4.0 - 11.0 x10(3) uL 5.8    RBC      4.30 - 5.70 x10(6)uL 4.35    Hemoglobin      13.0 - 17.5 g/dL 14.5    Hematocrit      39.0 - 53.0 % 42.1    MCV      80.0 - 100.0 fL 96.8    MCH      26.0 - 34.0 pg 33.3    MCHC      31.0 - 37.0 g/dL 34.4    RDW      11.0 - 15.0 % 12.3    RDW-SD      35.1 - 46.3 fL 43.1    Platelet Count      150.0 - 450.0 10(3)uL 189.0    Quantiferon TB NIL      IU/mL 0.04    Quantiferon-TB1 Minus NIL      IU/mL -0.02    Quantiferon-TB2 Minus NIL      IU/mL 0.00    Quantiferon TB Mitogen minus NIL      IU/mL >10.00    Quantiferon TB Result      Negative  Negative    CREATININE      0.70 - 1.30 mg/dL 0.85    EGFR      >=60 mL/min/1.73m2 115    AST (SGOT)      <34 U/L 21    C-REACTIVE PROTEIN      <1.00 mg/dL <0.40    SED RATE      0 - 15 mm/Hr 1    ALT (SGPT)      10 - 49 U/L 23            Summary:  1   cont. actemra 8mg/kg every 4 weeks   2.  Cont. Methotrexate 9 tablets a week - .Cont. Folic acid 1mg a day   3. Cont. Leflunomide 10mg a day -   4. Cont.  Prednisone - taper from  10mg a day -to 7.5mg a day   5. Check labs today  and then every 3months  6. Return to clinic in 3 months.   7. meloxicam 15mg a day   8. Norco bid prn - 1-2 daily prn   9. Try diet changes with turmeric, calcium /vit d - and meditterena diet   10. Left ankle ortho- and left ankle xray - if the xray is showing a lot of osteaorthriits - I can refer you to an ortho -            Israel Hyatt,  MD  4/9/2025   2:33 PM

## 2025-04-30 RX ORDER — MELOXICAM 15 MG/1
15 TABLET ORAL DAILY
Qty: 30 TABLET | Refills: 5 | Status: SHIPPED | OUTPATIENT
Start: 2025-04-30

## 2025-06-06 ENCOUNTER — TELEPHONE (OUTPATIENT)
Age: 35
End: 2025-06-06

## 2025-06-06 NOTE — TELEPHONE ENCOUNTER
Per 4/15/25 pt is getting Acterma infusion at [facility] and not [facility].      Future Appointments   Date Time Provider Department Center   8/27/2025 11:00 AM Amy Mcdonough MD [facility]

## 2025-06-26 ENCOUNTER — MED REC SCAN ONLY (OUTPATIENT)
Age: 35
End: 2025-06-26

## 2025-08-07 ENCOUNTER — OFFICE VISIT (OUTPATIENT)
Dept: RHEUMATOLOGY | Facility: CLINIC | Age: 35
End: 2025-08-07

## 2025-08-07 VITALS
HEIGHT: 70 IN | DIASTOLIC BLOOD PRESSURE: 80 MMHG | HEART RATE: 79 BPM | SYSTOLIC BLOOD PRESSURE: 124 MMHG | WEIGHT: 223 LBS | BODY MASS INDEX: 31.92 KG/M2

## 2025-08-07 DIAGNOSIS — M25.572 CHRONIC PAIN OF LEFT ANKLE: ICD-10-CM

## 2025-08-07 DIAGNOSIS — G89.29 CHRONIC PAIN OF LEFT KNEE: ICD-10-CM
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
## 2025-08-07 DIAGNOSIS — G89.29 CHRONIC PAIN OF LEFT ANKLE: ICD-10-CM

## 2025-08-07 DIAGNOSIS — M06.9 RHEUMATOID ARTHRITIS, INVOLVING UNSPECIFIED SITE, UNSPECIFIED WHETHER RHEUMATOID FACTOR PRESENT (HCC): Primary | ICD-10-CM

## 2025-08-07 DIAGNOSIS — M25.562 CHRONIC PAIN OF LEFT KNEE: ICD-10-CM

## 2025-08-07 DIAGNOSIS — M25.50 ARTHRALGIA, UNSPECIFIED JOINT: ICD-10-CM

## 2025-08-07 PROCEDURE — 3074F SYST BP LT 130 MM HG: CPT | Performed by: INTERNAL MEDICINE

## 2025-08-07 PROCEDURE — 3079F DIAST BP 80-89 MM HG: CPT | Performed by: INTERNAL MEDICINE

## 2025-08-07 PROCEDURE — G2211 COMPLEX E/M VISIT ADD ON: HCPCS | Performed by: INTERNAL MEDICINE

## 2025-08-07 PROCEDURE — 3008F BODY MASS INDEX DOCD: CPT | Performed by: INTERNAL MEDICINE

## 2025-08-07 PROCEDURE — 99215 OFFICE O/P EST HI 40 MIN: CPT | Performed by: INTERNAL MEDICINE

## 2025-08-07 RX ORDER — LEFLUNOMIDE 10 MG/1
10 TABLET ORAL DAILY
Qty: 30 TABLET | Refills: 3 | Status: SHIPPED | OUTPATIENT
Start: 2025-08-07

## 2025-08-07 RX ORDER — DULOXETIN HYDROCHLORIDE 30 MG/1
30 CAPSULE, DELAYED RELEASE ORAL DAILY
COMMUNITY
Start: 2025-08-04

## 2025-08-07 RX ORDER — ALPRAZOLAM 0.25 MG
0.25 TABLET ORAL NIGHTLY PRN
COMMUNITY
Start: 2025-08-04

## 2025-08-19 ENCOUNTER — HOSPITAL ENCOUNTER (OUTPATIENT)
Dept: GENERAL RADIOLOGY | Age: 35
Discharge: HOME | End: 2025-08-19
Attending: INTERNAL MEDICINE

## 2025-08-19 DIAGNOSIS — M06.9 RHEUMATOID ARTHRITIS, INVOLVING UNSPECIFIED SITE, UNSPECIFIED WHETHER RHEUMATOID FACTOR PRESENT (HCC): ICD-10-CM
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
## 2025-08-19 PROCEDURE — 73130 X-RAY EXAM OF HAND: CPT | Performed by: INTERNAL MEDICINE

## 2025-08-19 PROCEDURE — 73560 X-RAY EXAM OF KNEE 1 OR 2: CPT | Performed by: INTERNAL MEDICINE

## 2025-08-21 ENCOUNTER — MED REC SCAN ONLY (OUTPATIENT)
Age: 35
End: 2025-08-21

## (undated) DEVICE — CONMED SCOPE SAVER BITE BLOCK, 20X27 MM: Brand: SCOPE SAVER

## (undated) DEVICE — CODMAN® SURGICAL PATTIES 1/2" X 1/2" (1.27CM X 1.27CM): Brand: CODMAN®

## (undated) DEVICE — KIT CLEAN ENDOKIT 1.1OZ GOWNX2

## (undated) DEVICE — DIFFUSER: Brand: CORE, MAESTRO

## (undated) DEVICE — FORCEP RADIAL JAW 4

## (undated) DEVICE — 5.0MM PRECISION ROUND

## (undated) DEVICE — SOL  .9 1000ML BTL

## (undated) DEVICE — 20CM IQ STANDARD: Brand: SONOPET IQ

## (undated) DEVICE — 1.1MM X 6.0MM STRAIGHT ROUTER

## (undated) DEVICE — STERILE SYNTHETIC POLYISOPRENE POWDER-FREE SURGICAL GLOVES WITH HYDROGEL COATING: Brand: PROTEXIS

## (undated) DEVICE — Device: Brand: INTELLICART™

## (undated) DEVICE — 3 ML SYRINGE LUER-LOCK TIP: Brand: MONOJECT

## (undated) DEVICE — LIGACLIP EXTRA LIGATING CLIP CARTRIDGES: 6 TITANIUM CLIPS/ CARTRIDGE (SMALL): Brand: LIGACLIP

## (undated) DEVICE — STERILE POLYISOPRENE POWDER-FREE SURGICAL GLOVES: Brand: PROTEXIS

## (undated) DEVICE — IRRIGATION SUCTION CASSETTE: Brand: SONOPET IQ

## (undated) DEVICE — Device

## (undated) DEVICE — MAYFIELD® DISPOSABLE ADULT SKULL PIN (PLASTIC BASE): Brand: MAYFIELD®

## (undated) DEVICE — SUCTION CANISTER, DISPOSABLE

## (undated) DEVICE — PROBE

## (undated) DEVICE — GAUZE SPONGES,12 PLY: Brand: CURITY

## (undated) DEVICE — ALCOHOL 70% 4 OZ

## (undated) DEVICE — GOWN,SIRUS,FABRIC-REINFORCED,X-LARGE: Brand: MEDLINE

## (undated) DEVICE — KIT ENDO ORCAPOD 160/180/190

## (undated) DEVICE — SUTURE VICRYL 0 CT-1

## (undated) DEVICE — 12CM IQ STANDARD: Brand: SONOPET IQ

## (undated) DEVICE — CRANIOTOMY CDS: Brand: MEDLINE INDUSTRIES, INC.

## (undated) DEVICE — CODMAN® SURGICAL PATTIES 1/4" X 1/4" (0.64CM X 0.64CM): Brand: CODMAN®

## (undated) DEVICE — OIL CARTRIDGE: Brand: CORE, MAESTRO

## (undated) DEVICE — SUTURE VICRYL 2-0 CT-2

## (undated) DEVICE — MARKER SKIN PREP RESIST STRL

## (undated) DEVICE — SCD SLEEVE KNEE HI BLEND

## (undated) DEVICE — SUTURE ETHILON 3-0 PS-1

## (undated) DEVICE — LINE MNTR ADLT SET O2 INTMD

## (undated) DEVICE — MEDI-VAC NON-CONDUCTIVE SUCTION TUBING 6MM X 1.8M (6FT.) L: Brand: CARDINAL HEALTH

## (undated) DEVICE — FLOSEAL HEMOSTATIC MATRIX, 5ML: Brand: FLOSEAL HEMOSTATIC MATRIX

## (undated) DEVICE — BATTERY PACK FOR VARISPEED: Brand: STRYKER VARISPEED

## (undated) DEVICE — PAD SACRAL SPAN AID

## (undated) DEVICE — RANEY SCALP CLIP STERILE: Brand: AESCULAP

## (undated) DEVICE — 2.3MM SPIRAL ROUTER

## (undated) DEVICE — PROBE 8225101 5PK STD PRASS FL TIP ROHS

## (undated) DEVICE — Device: Brand: DEFENDO AIR/WATER/SUCTION AND BIOPSY VALVE

## (undated) DEVICE — CAUTERY CORD BIPOLAR YASARGIL

## (undated) DEVICE — SUTURE NUROLON 4-0 TF

## (undated) DEVICE — 6 ML SYRINGE LUER-LOCK TIP: Brand: MONOJECT

## (undated) DEVICE — MICRO COVER: Brand: UNBRANDED

## (undated) NOTE — LETTER
10/31/2023              [address]       To Dr. Gamal Simon,     The following are Jennifer George's medications for his rheumatoid arthritis. He is cleared for surgery - left shoulder replacement - but anticipate more joint pain prior and afterwards resulting from holding his medications. He is also on a high doses of daily steroids of prednisone 20mg a day and will require stress dose steroids. 1    actemra 162mg every week - - hold 2 week before and 2 weeks after   2. Methotrexate 9 tablets a week - hold 1 week before and 1 week after   3. Prednisone  20mg a day - will need stress dose steroids. , cont.  Through the surgery   Plan for stress dose steroids - Usual preoperative steroids + 25 mg hydrocortisone at induction + 100 mg hydrocortisone/day for 2-3 days Resume normal oral therapy post op of pred 20mg a day   5.   sulfasalazine 500mg twice a day - hold 1 week before and 1 week after ,      Sincerely,          Yaa David MD  [facility] [place] [address] [place] Nikki Lacer [id number] [phone]        Document electronically generated by:  Yaa David MD

## (undated) NOTE — MR AVS SNAPSHOT
[facility]  [address] [place] [address], [address] [place] [address]               Thank you for choosing us for your health care visit with Teena Lane MD.  We are glad to serve you and happy to provide you with this summary Bring a paper prescription for each of these medications    - HYDROcodone-acetaminophen 5-325 MG Tabs            Today's Orders     Albumin Serum  (Every 3 months and PRN)   Expires on:  Feb 08, 2018    Assoc Dx:   Seropositive rheumatoid arthritis of rosarioshirley

## (undated) NOTE — LETTER
10/6/2021      RE: Stacey Noel     : 1990    Dear Dr. Anita Roper,    This letter is to inform you that your patient is being scheduled for surgery with Dr. Domingo Hansen on 10/13/21 at [facility]. We have asked the patient to contact your office to

## (undated) NOTE — LETTER
Patient Name: Jailyn Torres CSN: [id number]  -Age / Sex: 1990-A: 26 y  male Medical Records: [id number]    ABNORMAL VALUES  Surgeon(s):  Michael Fair MD  Anesthesia Type: General  Procedure Description: SUBOCCIPITAL CRANIECTOMY FOR TUMOR W

## (undated) NOTE — LETTER
01/12/22    Shawn Donis  [facility] [id number]      Dear Deysi Howard,    Our records indicate that you have outstanding lab work and or testing that was ordered for you and has not yet been completed: with [address]

## (undated) NOTE — LETTER
08/19/20        Ann Giordano  [address] [place] [address]      Dear Arvind Santana records indicate that you have outstanding lab work and or testing that was ordered for you and has not yet been completed:  Orders Placed This Encounter

## (undated) NOTE — MR AVS SNAPSHOT
After Visit Summary   9/21/2021    Jess Lindo   MRN: [id number]           Visit Information     Date & Time  9/21/2021  9:00 AM Provider  Karli Parkinson MD Department  [address] [facility]  Phone  [phone] FIDUCIAL (CPT=70553) [COMBO CPT(R)]  9/21/2021 (Approximate) 9/21/2022    MRI CERVICAL+THORACIC+LUMBAR SPINE (ALL W+WO) (CPT=72156/21416/65999) [COMBO CPT(R)]  9/21/2021 (Approximate) 9/21/2022      Future Appointments        Provider Department    9/22/20 obtain this authorization for your ordered radiology test.    To schedule an appointment for your radiology test please call [facility] Scheduling   at [phone].       Around September 21, 2021   Imaging:   MRI CERVICAL+THORACIC+LUMBAR SPIN APPOINTMENTS   Available at primary care offices    AFTER HOURS CARE  [place]  OFFICE VISIT   Primary Care Providers  Treatment for mild illness or injury that does not require immediate attention.  Average cost  $70*   [facility]  Daphney Farias

## (undated) NOTE — LETTER
04/10/20        Dominik Cowan  [address] [place] [address]      Dear Martina Graves records indicate that you have outstanding lab work and or testing that was ordered for you and has not yet been completed.     To provide you with the best pos

## (undated) NOTE — LETTER
2/20/2021              [address] [place] [address]         To Whom It May Concern,     Hailey Steiner is under my medical care.    Currently my patient has an autoimmune condition and is currently on immunosupp

## (undated) NOTE — LETTER
07/09/20        [address] [facility] [address]      Dear Aashish Harrison records indicate that you have outstanding lab work and or testing that was ordered for you and has not yet been completed:  Orders Placed This Encounter

## (undated) NOTE — LETTER
03/16/20    Le Massed  [address] [place] [address]      Dear Jason Morgan records indicate that you have outstanding lab work and or testing that was ordered for you and has not yet been completed:  Orders Placed This Encounter      CBC,

## (undated) NOTE — IP AVS SNAPSHOT
[address]            (For Outpatient Use Only) Initial Admit Date: 10/13/2021   Inpt/Obs Admit Date: Inpt: 10/13/21 / Obs: N/A   Discharge Date:    Eli Copier:  [de-identified]   MRN: [de-identified]   CSN: [id number]   CEID: [id number] Subscriber Name:  Kaveh Clark :    Subscriber ID:  Pt Rel to Subscriber:    Hospital Account Financial Class: [facility]    2021

## (undated) NOTE — LETTER
02/16/21        k Alissa  [address] [facility] [address]      Dear Maru Joyce records indicate that you have outstanding lab work and or testing that was ordered for you and has not yet been completed:  Orders Placed This Encounter

## (undated) NOTE — IP AVS SNAPSHOT
Patient Demographics     Address  Ignacio Kelly Phone  [phone] [facility])  [phone] (Mobile) *Preferred* E-mail Address  [email or web address]      Emergency Contact(s)     Name Relation Home Work [address] [place] [address] 1 to 3 weeks. Make sure you are getting plenty of rest  Medications:  • Pain medications are prescribed for pain.   If the pain is not severe, you may take over the counter (OTC) Extra Strength Tylenol for pain relief    • Narcotic pain medications may cau rest  • New or worsening speech difficulties, visual problems, numbness or tingling, or weakness  • Uncontrolled nausea or vomiting  • Redness, drainage, swelling, or warmth of the incision      Ok to shower today         Follow-up Information     Magaly Mccord levETIRAcetam 500 MG Tabs  Commonly known as: KEPPRA      Take 1 tablet (500 mg total) by mouth 2 (two) times daily for 2 days.   Stop taking on: October 22, 2021   [facility]   [    ]   [    ]   [    ]   [    ]     Meloxicam 15 MG Tabs      TAKE Bag      [id number] levETIRAcetam (KEPPRA) injection 500 mg 10/20/21 1025 New Bag      [id number] ondansetron [facility]) injection 8 mg 10/20/21 0019 Given      [id number] tamsulosin (FLOMAX) cap 0.4 mg 10/20/21 0855 Given            LEFT LOWER ABDOMEN     Orde fourth ventricle biopsy. acetaminophen 500 MG Oral Tab, Take 1,000 mg by mouth one time. , Disp: , Rfl: , 10/13/2021 at 0130  HYDROcodone-acetaminophen (NORCO)  MG Oral Tab, Take 1 tablet by mouth every 4 (four) hours as needed for Pain., Disp: 60 Never Used    Alcohol use: Yes      Comment: rare      SYSTEM Check if Review is Normal Check if Physical Exam is Normal If not normal, please explain:   LUIS [ ] [ ]    Yfn Pittman Misty.Simpson ] Misty.Simpson ]    HEART Misty.Simpson ] Misty.Simpson ]    LUNGS Misty.Simpson ] Misty.Simpson ]    ABDOMEN [ ] Misty.Simpson ] Ciarra Rivas Established Goals: 7    Presenting Problem: S/p suboccipital craniectomy for tumor 10/13    ASSESSMENT   The pt is seen for physical therapy treatment session for transfer training, gait training, and visual exercises.   Pt demonstrates improvement in his a BEARING RESTRICTION  Weight Bearing Restriction: None                PAIN ASSESSMENT   Ratin  Location: Posterior head  Management Techniques:  Activity promotion;Repositioning;Relaxation    BALANCE Mobility:  Sit<>Stand: completed x3 reps with min-mod assist   Gait: Max A x1 initially but with c/o increasing pain, dizziness pt exhibits increasing sway and posterior lean, requiring up to Max A x2    Environmental Barriers:  Stairs: unable to complete significant co-morbidities include left trochlear nerve palsy, knee effusion, rheumatoid arthritis.      Problem List  Active Problems:    Brain tumor ([address]    Left trochlear nerve palsy    S/P brain surgery    Pneumocephalus      Past Medical History  Past right lateral LOB and anterior LOB of 5 occasions. Pt able to verbalize correctly which side LOB was occurring on and required MAX verbal cues to take their time. Pt required MOD verbal cues to avoid obstacles on left side.  Pt able to take side steps to g evaluation  Rehab Potential : Good  Frequency (Obs): 3x/week (2-3x/week)      OT Goals:     ADL Goals   Patient will perform grooming: while at edge of bed  Patient will perform upper body dressing:  with mod assist  Patient will perform lower body dressin recommended diet, ensure proper utilization of aspiration precautions and provide pt/family education. RN reports that pt is having difficulty taking pills. Pt found in room with lights off c/o headache and being tired.   Pt agreeable to treatment session Ping Bojorquez M.S., CCC-SLP/L  Speech-Language Pathologist     Electronically signed by RADHA Tyler on 10/20/2021 10:38 AM           Immunizations     Name Date      Methylprednisolone 40 Mg Inj 02/09/15       Future Appointments        Provider Marlon